# Patient Record
Sex: FEMALE | Race: WHITE | NOT HISPANIC OR LATINO | Employment: FULL TIME | URBAN - METROPOLITAN AREA
[De-identification: names, ages, dates, MRNs, and addresses within clinical notes are randomized per-mention and may not be internally consistent; named-entity substitution may affect disease eponyms.]

---

## 2023-05-23 ENCOUNTER — LAB (OUTPATIENT)
Dept: LAB | Facility: LAB | Age: 48
End: 2023-05-23
Payer: COMMERCIAL

## 2023-05-23 ENCOUNTER — OFFICE VISIT (OUTPATIENT)
Dept: PRIMARY CARE | Facility: CLINIC | Age: 48
End: 2023-05-23
Payer: COMMERCIAL

## 2023-05-23 VITALS
SYSTOLIC BLOOD PRESSURE: 110 MMHG | BODY MASS INDEX: 21.92 KG/M2 | WEIGHT: 131.6 LBS | DIASTOLIC BLOOD PRESSURE: 70 MMHG | HEIGHT: 65 IN | HEART RATE: 110 BPM

## 2023-05-23 DIAGNOSIS — R04.2 HEMOPTYSIS: Primary | ICD-10-CM

## 2023-05-23 DIAGNOSIS — I26.99 MULTIPLE PULMONARY EMBOLI (MULTI): ICD-10-CM

## 2023-05-23 DIAGNOSIS — D47.3 ESSENTIAL (HEMORRHAGIC) THROMBOCYTHEMIA (MULTI): ICD-10-CM

## 2023-05-23 DIAGNOSIS — R00.0 TACHYCARDIA: ICD-10-CM

## 2023-05-23 DIAGNOSIS — R04.2 HEMOPTYSIS: ICD-10-CM

## 2023-05-23 PROBLEM — E78.5 HYPERLIPIDEMIA: Status: ACTIVE | Noted: 2023-05-23

## 2023-05-23 PROBLEM — R79.89 ABNORMAL CBC: Status: ACTIVE | Noted: 2023-05-23

## 2023-05-23 PROBLEM — K51.90 ULCERATIVE COLITIS (MULTI): Status: ACTIVE | Noted: 2018-11-01

## 2023-05-23 LAB
BASOPHILS (10*3/UL) IN BLOOD BY AUTOMATED COUNT: 0.23 X10E9/L (ref 0–0.1)
BASOPHILS/100 LEUKOCYTES IN BLOOD BY AUTOMATED COUNT: 1.3 % (ref 0–2)
DACROCYTES PRESENCE IN BLOOD BY LIGHT MICROSCOPY: NORMAL
EOSINOPHILS (10*3/UL) IN BLOOD BY AUTOMATED COUNT: 1.56 X10E9/L (ref 0–0.7)
EOSINOPHILS/100 LEUKOCYTES IN BLOOD BY AUTOMATED COUNT: 8.5 % (ref 0–6)
ERYTHROCYTE DISTRIBUTION WIDTH (RATIO) BY AUTOMATED COUNT: 17.8 % (ref 11.5–14.5)
ERYTHROCYTE MEAN CORPUSCULAR HEMOGLOBIN CONCENTRATION (G/DL) BY AUTOMATED: 30 G/DL (ref 32–36)
ERYTHROCYTE MEAN CORPUSCULAR VOLUME (FL) BY AUTOMATED COUNT: 79 FL (ref 80–100)
ERYTHROCYTES (10*6/UL) IN BLOOD BY AUTOMATED COUNT: 5.4 X10E12/L (ref 4–5.2)
HEMATOCRIT (%) IN BLOOD BY AUTOMATED COUNT: 42.7 % (ref 36–46)
HEMOGLOBIN (G/DL) IN BLOOD: 12.8 G/DL (ref 12–16)
IMMATURE GRANULOCYTES/100 LEUKOCYTES IN BLOOD BY AUTOMATED COUNT: 0.3 % (ref 0–0.9)
LEUKOCYTES (10*3/UL) IN BLOOD BY AUTOMATED COUNT: 18.3 X10E9/L (ref 4.4–11.3)
LYMPHOCYTES (10*3/UL) IN BLOOD BY AUTOMATED COUNT: 4.74 X10E9/L (ref 1.2–4.8)
LYMPHOCYTES/100 LEUKOCYTES IN BLOOD BY AUTOMATED COUNT: 25.9 % (ref 13–44)
MONOCYTES (10*3/UL) IN BLOOD BY AUTOMATED COUNT: 1.17 X10E9/L (ref 0.1–1)
MONOCYTES/100 LEUKOCYTES IN BLOOD BY AUTOMATED COUNT: 6.4 % (ref 2–10)
NEUTROPHILS (10*3/UL) IN BLOOD BY AUTOMATED COUNT: 10.55 X10E9/L (ref 1.2–7.7)
NEUTROPHILS/100 LEUKOCYTES IN BLOOD BY AUTOMATED COUNT: 57.6 % (ref 40–80)
OVALOCYTES PRESENCE IN BLOOD BY LIGHT MICROSCOPY: NORMAL
PLATELETS (10*3/UL) IN BLOOD AUTOMATED COUNT: 1618 X10E9/L (ref 150–450)
RBC MORPHOLOGY IN BLOOD: NORMAL
SCHISTOCYTES (PRESENCE) IN BLOOD BY LIGHT MICROSCOPY: NORMAL

## 2023-05-23 PROCEDURE — 36415 COLL VENOUS BLD VENIPUNCTURE: CPT

## 2023-05-23 PROCEDURE — 99215 OFFICE O/P EST HI 40 MIN: CPT | Performed by: STUDENT IN AN ORGANIZED HEALTH CARE EDUCATION/TRAINING PROGRAM

## 2023-05-23 PROCEDURE — 1036F TOBACCO NON-USER: CPT | Performed by: STUDENT IN AN ORGANIZED HEALTH CARE EDUCATION/TRAINING PROGRAM

## 2023-05-23 PROCEDURE — 85025 COMPLETE CBC W/AUTO DIFF WBC: CPT

## 2023-05-23 RX ORDER — ONDANSETRON HYDROCHLORIDE 8 MG/1
8 TABLET, FILM COATED ORAL EVERY 8 HOURS PRN
COMMUNITY
Start: 2022-12-27

## 2023-05-23 RX ORDER — APIXABAN 5 MG/1
5 TABLET, FILM COATED ORAL EVERY 12 HOURS
COMMUNITY
End: 2023-09-14 | Stop reason: SDUPTHER

## 2023-05-23 RX ORDER — OMEPRAZOLE 20 MG/1
20 CAPSULE, DELAYED RELEASE ORAL DAILY
COMMUNITY
Start: 2022-12-27

## 2023-05-23 RX ORDER — CYCLOBENZAPRINE HCL 10 MG
10 TABLET ORAL NIGHTLY
COMMUNITY
End: 2023-09-14 | Stop reason: SDUPTHER

## 2023-05-23 NOTE — PROGRESS NOTES
Subjective   Patient ID: Elise Chávez is a 47 y.o. female who presents for Coughed up Blood.  Today she is accompanied by alone.     HPI  Elise is presenting into the office today because of coughing up blood  She has a longstanding past medical history of thrombocytosis, elevated platelet count, and multiple PEs that were found in her lungs  She continues to take Eliquis twice daily as indicated in the chart but unfortunate her hematologist/oncologist just left the system to [place]  Has not followed up within the past few months and now asking to have her lungs looked into again     Ultimately over the past weekend she started to notice that she had a coughing spell due to laughing a lot on Saturday    Unfortunately she continued to cough up some blood as well as even some minor clots within her sputum off-and-on on Saturday, Sunday, and even Monday until all of this resolved spontaneously    Stated that all of her signs/symptoms mainly occurred when she was laying flat but denies any shortness of breath or any other signs/symptoms at this time    Due to this, she is now asking if she can get another CT angiogram of her chest to make sure there are no underlying clots or anything else underlying  Also wondering if she can make sure that her blood count is normalizing and wishes to have her CBC repeated after today's visit     Lastly, mentioned that she is following up with a holistic provider and taking supplements and has an appointment in the beginning of June  Wishes to update myself at today's visit    Current Outpatient Medications on File Prior to Visit   Medication Sig Dispense Refill    omeprazole (PriLOSEC) 20 mg DR capsule Take 1 capsule (20 mg) by mouth once daily.      ondansetron (Zofran) 8 mg tablet Take 1 tablet (8 mg) by mouth every 8 hours if needed for nausea.      cyclobenzaprine (Flexeril) 10 mg tablet Take 1 tablet (10 mg) by mouth once daily at bedtime.      Eliquis 5 mg tablet Take 1 tablet (5  "mg) by mouth every 12 hours.       No current facility-administered medications on file prior to visit.        No Known Allergies      There is no immunization history on file for this patient.      Review of Systems  All pertinent positive symptoms are included in the history of present illness.  All other systems have been reviewed and are negative and noncontributory to this patient's current ailments.     Objective   /70 (BP Location: Left arm, Patient Position: Sitting, BP Cuff Size: Adult)   Pulse 110   Ht 1.651 m (5' 5\")   Wt 59.7 kg (131 lb 9.6 oz)   BMI 21.90 kg/m²   BSA: 1.65 meters squared    Physical Exam  CONSTITUTIONAL - well nourished, well developed, looks like stated age, in no acute distress, not ill-appearing, and not tired appearing  SKIN - normal skin color and pigmentation, normal skin turgor without rash, lesions, or nodules visualized  HEAD - no trauma, normocephalic  EYES - normal external exam  CHEST -no distressed breathing, good effort, tachycardic, lungs clear to auscultation bilaterally  EXTREMITIES - no edema, no deformities  NEUROLOGICAL - normal balance, normal motor, no ataxia  PSYCHIATRIC - alert, pleasant and cordial, age-appropriate    Assessment/Plan   1.  Hemoptysis/essential thrombocytopenia/multiple pulmonary emboli, history/tachycardia    Due to all of your signs/symptoms I am fearful that there may be another PE or other clots that are underlying or even developing/worsening    I did order a CT angio chest for PE that was actually done this past fall and was negative    I understand that you are not fully concerned as you are not labored in breathing but I did recommend you get this done at your earliest mutual convenience  I know you did this last at [facility] and I would recommend you call and make an appointment after today's visit    At any point if you have worsening chest pain, palpitations, shortness of breath, difficulty breathing, or any other " acute signs/symptoms, I would recommend that you go to the emergency room immediately to be acutely evaluated    We also had a long conversation about me being fearful of something else underlying and that the Eliquis may not be fully beneficial or even you may need to follow-up with another hematologist to establish    Please contact the office if you have any further questions/concerns

## 2023-05-24 ENCOUNTER — TELEPHONE (OUTPATIENT)
Dept: PRIMARY CARE | Facility: CLINIC | Age: 48
End: 2023-05-24
Payer: COMMERCIAL

## 2023-05-24 LAB — CBC DIFFERENTIAL PATH REVIEW: NORMAL

## 2023-05-24 NOTE — RESULT ENCOUNTER NOTE
Complete blood cell count shows an elevated white blood cell count 18.3 as well as an elevated red blood cell count of 5.4    Neutrophils also elevated at 10.55    Her platelet cell count is unfortunately the highest on file at 1618    I would recommend that she gets the CT scan done at her earliest mutual convenience and if she has at any point having any difficulty breathing or any acute signs/symptoms that she should go to the emergency room at [facility] as discussed at her appointment    It may be another option to also follow-up with a new hematologist such as Dr. Jhonny Rivera

## 2023-05-24 NOTE — TELEPHONE ENCOUNTER
----- Message from Cam Cazares DO sent at 5/24/2023  6:38 AM EDT -----  Complete blood cell count shows an elevated white blood cell count 18.3 as well as an elevated red blood cell count of 5.4    Neutrophils also elevated at 10.55    Her platelet cell count is unfortunately the highest on file at 1618    I would recommend that she gets the CT scan done at her earliest mutual convenience and if she has at any point having any difficulty breathing or any acute signs/symptoms that she should go to the emergency room at [facility] as discussed at her appointment    It may be another option to also follow-up with a new hematologist such as Dr. Jhonny Rivera

## 2023-06-13 ENCOUNTER — TELEPHONE (OUTPATIENT)
Dept: PRIMARY CARE | Facility: CLINIC | Age: 48
End: 2023-06-13
Payer: COMMERCIAL

## 2023-06-13 NOTE — TELEPHONE ENCOUNTER
CT chest showed some right lung scarring with some lesions assuming a nodular configuration which may be slightly more prominent compared to the previous study    It was recommended a short-term interval follow-up in 6 months is suggested    She continues to have some slight continuing pericardial effusion/fluid around her heart    This appears that this was only a CT chest that was done but I ordered a CT angio to make sure there were no pulmonary emboli    I feel that the CT chest was not fully accurate and I would almost recommend that we repeat another CT angio to make sure that there are no clots as the CT chest may not be fully accurate for finding anything

## 2023-06-14 ENCOUNTER — TELEPHONE (OUTPATIENT)
Dept: PRIMARY CARE | Facility: CLINIC | Age: 48
End: 2023-06-14
Payer: COMMERCIAL

## 2023-06-14 NOTE — TELEPHONE ENCOUNTER
Spoke with the pt about her CT Chest results, pt states her veins were not holding the day of the imaging and she made the decision to go ahead without, pt was dehydrated.     In regards to the lesions of the right lung, she wants to know what could cause that and if they are of concern?

## 2023-09-13 ENCOUNTER — OFFICE VISIT (OUTPATIENT)
Dept: PRIMARY CARE | Facility: CLINIC | Age: 48
End: 2023-09-13
Payer: COMMERCIAL

## 2023-09-13 ENCOUNTER — LAB (OUTPATIENT)
Dept: LAB | Facility: LAB | Age: 48
End: 2023-09-13
Payer: COMMERCIAL

## 2023-09-13 VITALS
BODY MASS INDEX: 21.69 KG/M2 | DIASTOLIC BLOOD PRESSURE: 78 MMHG | WEIGHT: 130.2 LBS | HEIGHT: 65 IN | HEART RATE: 140 BPM | SYSTOLIC BLOOD PRESSURE: 112 MMHG

## 2023-09-13 DIAGNOSIS — R04.2 HEMOPTYSIS: ICD-10-CM

## 2023-09-13 DIAGNOSIS — R00.0 TACHYCARDIA: ICD-10-CM

## 2023-09-13 DIAGNOSIS — R04.2 HEMOPTYSIS: Primary | ICD-10-CM

## 2023-09-13 DIAGNOSIS — D47.3 ESSENTIAL (HEMORRHAGIC) THROMBOCYTHEMIA (MULTI): ICD-10-CM

## 2023-09-13 DIAGNOSIS — I26.99 MULTIPLE PULMONARY EMBOLI (MULTI): ICD-10-CM

## 2023-09-13 LAB
ACANTHOCYTES PRESENCE IN BLOOD BY LIGHT MICROSCOPY: NORMAL
BASOPHILS (10*3/UL) IN BLOOD BY AUTOMATED COUNT: 0.2 X10E9/L (ref 0–0.1)
BASOPHILS/100 LEUKOCYTES IN BLOOD BY AUTOMATED COUNT: 1.3 % (ref 0–2)
DACROCYTES PRESENCE IN BLOOD BY LIGHT MICROSCOPY: NORMAL
EOSINOPHILS (10*3/UL) IN BLOOD BY AUTOMATED COUNT: 0.94 X10E9/L (ref 0–0.7)
EOSINOPHILS/100 LEUKOCYTES IN BLOOD BY AUTOMATED COUNT: 6.3 % (ref 0–6)
ERYTHROCYTE DISTRIBUTION WIDTH (RATIO) BY AUTOMATED COUNT: 22 % (ref 11.5–14.5)
ERYTHROCYTE MEAN CORPUSCULAR HEMOGLOBIN CONCENTRATION (G/DL) BY AUTOMATED: 30.2 G/DL (ref 32–36)
ERYTHROCYTE MEAN CORPUSCULAR VOLUME (FL) BY AUTOMATED COUNT: 75 FL (ref 80–100)
ERYTHROCYTES (10*6/UL) IN BLOOD BY AUTOMATED COUNT: 5.2 X10E12/L (ref 4–5.2)
HEMATOCRIT (%) IN BLOOD BY AUTOMATED COUNT: 39.1 % (ref 36–46)
HEMOGLOBIN (G/DL) IN BLOOD: 11.8 G/DL (ref 12–16)
HYPOCHROMIA (PRESENCE) IN BLOOD BY LIGHT MICROSCOPY: NORMAL
IMMATURE GRANULOCYTES/100 LEUKOCYTES IN BLOOD BY AUTOMATED COUNT: 0.4 % (ref 0–0.9)
LEUKOCYTES (10*3/UL) IN BLOOD BY AUTOMATED COUNT: 14.9 X10E9/L (ref 4.4–11.3)
LYMPHOCYTES (10*3/UL) IN BLOOD BY AUTOMATED COUNT: 3.7 X10E9/L (ref 1.2–4.8)
LYMPHOCYTES/100 LEUKOCYTES IN BLOOD BY AUTOMATED COUNT: 24.8 % (ref 13–44)
MONOCYTES (10*3/UL) IN BLOOD BY AUTOMATED COUNT: 0.88 X10E9/L (ref 0.1–1)
MONOCYTES/100 LEUKOCYTES IN BLOOD BY AUTOMATED COUNT: 5.9 % (ref 2–10)
NEUTROPHILS (10*3/UL) IN BLOOD BY AUTOMATED COUNT: 9.14 X10E9/L (ref 1.2–7.7)
NEUTROPHILS/100 LEUKOCYTES IN BLOOD BY AUTOMATED COUNT: 61.3 % (ref 40–80)
OVALOCYTES PRESENCE IN BLOOD BY LIGHT MICROSCOPY: NORMAL
PLATELETS (10*3/UL) IN BLOOD AUTOMATED COUNT: 1646 X10E9/L (ref 150–450)
RBC MORPHOLOGY IN BLOOD: NORMAL
SCHISTOCYTES (PRESENCE) IN BLOOD BY LIGHT MICROSCOPY: NORMAL
TARGET CELLS IN BLOOD BY LIGHT MICROSCOPY: NORMAL

## 2023-09-13 PROCEDURE — 85025 COMPLETE CBC W/AUTO DIFF WBC: CPT

## 2023-09-13 PROCEDURE — 99215 OFFICE O/P EST HI 40 MIN: CPT | Performed by: STUDENT IN AN ORGANIZED HEALTH CARE EDUCATION/TRAINING PROGRAM

## 2023-09-13 PROCEDURE — 36415 COLL VENOUS BLD VENIPUNCTURE: CPT

## 2023-09-13 PROCEDURE — 93000 ELECTROCARDIOGRAM COMPLETE: CPT | Performed by: STUDENT IN AN ORGANIZED HEALTH CARE EDUCATION/TRAINING PROGRAM

## 2023-09-13 PROCEDURE — 1036F TOBACCO NON-USER: CPT | Performed by: STUDENT IN AN ORGANIZED HEALTH CARE EDUCATION/TRAINING PROGRAM

## 2023-09-13 NOTE — PROGRESS NOTES
Subjective   Patient ID: Elise Chávez is a 47 y.o. female who presents for Coughing Up Blood.  Today she is accompanied by alone.     HPI  Elise is presenting into the office today because of coughing up blood once again      She has a longstanding past medical history of thrombocytosis, elevated platelet count, and multiple PEs that were found in her lungs    She continues to take Eliquis twice daily as indicated in the chart  In the past it was noted that her oncologist/hematologist left the system to go down to [place]    Has been noticing increased signs/symptoms of a cough when she is doing anything strenuous and even noticing that she has coughed up which she believes could be even parts of a blood clot or even just blood itself    In the past this is happened before including during her last appointment on 5/23/2023    Denies any excessive shortness of breath or any chest pain but stated in the past when she had blood clots her platelet cell count was below 600-900  Now wondering if a CBC is needed to be done    Also mentioned that she was not fully compliant with her Eliquis twice daily on occasion was just taking 2 at night    On further questioning she is now concerned on if this could be something underlying and now asking for further guidance/recommendations and even wondering if another CT angiogram needs to be done    It was recommended before this appointment that she should have went to the emergency room but she wished to discuss this further with myself today      Current Outpatient Medications on File Prior to Visit   Medication Sig Dispense Refill    cyclobenzaprine (Flexeril) 10 mg tablet Take 1 tablet (10 mg) by mouth once daily at bedtime.      Eliquis 5 mg tablet Take 1 tablet (5 mg) by mouth every 12 hours.      omeprazole (PriLOSEC) 20 mg DR capsule Take 1 capsule (20 mg) by mouth once daily.      ondansetron (Zofran) 8 mg tablet Take 1 tablet (8 mg) by mouth every 8 hours if needed for  "nausea.       No current facility-administered medications on file prior to visit.        No Known Allergies      There is no immunization history on file for this patient.      Review of Systems  All pertinent positive symptoms are included in the history of present illness.  All other systems have been reviewed and are negative and noncontributory to this patient's current ailments.     Objective   /78 (BP Location: Left arm, Patient Position: Sitting, BP Cuff Size: Adult)   Pulse (!) 140   Ht 1.651 m (5' 5\")   Wt 59.1 kg (130 lb 3.2 oz)   BMI 21.67 kg/m²   BSA: 1.65 meters squared    Physical Exam  CONSTITUTIONAL - well nourished, well developed, looks like stated age, in no acute distress, not ill-appearing, and not tired appearing  SKIN - normal skin color and pigmentation, normal skin turgor without rash, lesions, or nodules visualized  HEAD - no trauma, normocephalic  EYES - normal external exam  CHEST -no distressed breathing, good effort, tachycardic, lungs clear to auscultation bilaterally  EXTREMITIES - no edema, no deformities  NEUROLOGICAL - normal balance, normal motor, no ataxia  PSYCHIATRIC - alert, pleasant and cordial, age-appropriate    Assessment/Plan   1.  Hemoptysis/essential thrombocytopenia/multiple pulmonary emboli, history/tachycardia    Due to all of your signs/symptoms I am fearful that there may be another PE or other clots that are underlying or even developing/worsening once again    I recommended to go the emergency room today but you declined but we did agree that I will order a stat CT angio chest for PE to be fully thorough    Please try to get this at [facility] as you have had success within their facilities in the past    At any point if you have worsening chest pain, palpitations, shortness of breath, difficulty breathing, or any other acute signs/symptoms, I would recommend that you go to the emergency room immediately to be acutely evaluated    I know we " did order a CBC to see where your platelet count is and I understand that if you are platelet count is slightly lower that has correlated with you having a blood clot    We will notify of the results and make recommendations accordingly in the morning    Lastly, to be fully thorough, I did do an EKG which showed sinus tachycardia with a heart rate of 117 bpm  I was looking for the classic [id number] with a right bundle branch block but your EKG did not show this today per my read and the computer    We even discussed this at length and compared this to your previous EKG that was done in 2021 within the emergency room    We also had a long conversation about me being fearful of something else underlying and that the Eliquis may not be fully beneficial or even you may need to follow-up with another hematologist to establish once again    Please contact the office if you have any further questions/concerns

## 2023-09-14 ENCOUNTER — TELEPHONE (OUTPATIENT)
Dept: PRIMARY CARE | Facility: CLINIC | Age: 48
End: 2023-09-14
Payer: COMMERCIAL

## 2023-09-14 DIAGNOSIS — M62.830 SPASM OF MUSCLE OF LOWER BACK: ICD-10-CM

## 2023-09-14 DIAGNOSIS — I26.99 MULTIPLE PULMONARY EMBOLI (MULTI): ICD-10-CM

## 2023-09-14 DIAGNOSIS — R06.02 SHORTNESS OF BREATH: ICD-10-CM

## 2023-09-14 LAB — CBC DIFFERENTIAL PATH REVIEW: NORMAL

## 2023-09-14 RX ORDER — CYCLOBENZAPRINE HCL 10 MG
10 TABLET ORAL NIGHTLY
Qty: 30 TABLET | Refills: 0 | Status: SHIPPED | OUTPATIENT
Start: 2023-09-14 | End: 2023-12-05 | Stop reason: SDUPTHER

## 2023-09-14 NOTE — TELEPHONE ENCOUNTER
Pt asking if she is able to get a refill on her muscle relaxer and if you are able to take over her Rx for Eliquis. Pt does need a refill on the Eliquis.

## 2023-09-14 NOTE — RESULT ENCOUNTER NOTE
Complete blood cell count shows an elevated white blood cell count at 14.9 and a slight anemia with hemoglobin 11.8    Platelet count continues to be elevated at 1646    Neutrophils also elevated at 914    I would truly recommend that she gets a CT scan of her chest with IV contrast at her earliest mutual convenience    At any time if she has worsening or acute signs/symptoms she needs to go to the emergency room immediately

## 2023-09-14 NOTE — TELEPHONE ENCOUNTER
Spoke w/pt, pt aware of results      Complete blood cell count shows an elevated white blood cell count at 14.9 and a slight anemia with hemoglobin 11.8     Platelet count continues to be elevated at 1646     Neutrophils also elevated at 914     I would truly recommend that she gets a CT scan of her chest with IV contrast at her earliest mutual convenience     At any time if she has worsening or acute signs/symptoms she needs to go to the emergency room immediately

## 2023-09-20 ENCOUNTER — TELEPHONE (OUTPATIENT)
Dept: PRIMARY CARE | Facility: CLINIC | Age: 48
End: 2023-09-20
Payer: COMMERCIAL

## 2023-09-20 NOTE — TELEPHONE ENCOUNTER
"Please call the patient as we just received her CT scan that was done on 9/19/2023    Unfortunately there is an acute pulmonary thromboembolism with extensive thrombus in the right pulmonary arterial circulation    It was also mentioned that a \"large pulmonary embolism in the right main pulmonary artery \"    There is also a small pericardial effusion which means fluid around the heart that has increased since the previous CT scan as well as some scarring in the right lung that is stable in appearance    Due to this I need her to go to the hospital immediately to be acutely evaluated as upon further evaluation it was noted to be 3 x 2.5 cm within this pulmonary artery and this could be life-threatening  "

## 2023-12-05 ENCOUNTER — OFFICE VISIT (OUTPATIENT)
Dept: PRIMARY CARE | Facility: CLINIC | Age: 48
End: 2023-12-05
Payer: COMMERCIAL

## 2023-12-05 VITALS
HEART RATE: 131 BPM | DIASTOLIC BLOOD PRESSURE: 74 MMHG | SYSTOLIC BLOOD PRESSURE: 112 MMHG | WEIGHT: 131.8 LBS | BODY MASS INDEX: 21.93 KG/M2

## 2023-12-05 DIAGNOSIS — R00.0 TACHYCARDIA: ICD-10-CM

## 2023-12-05 DIAGNOSIS — D47.3 ESSENTIAL (HEMORRHAGIC) THROMBOCYTHEMIA (MULTI): Primary | ICD-10-CM

## 2023-12-05 DIAGNOSIS — K51.80 OTHER ULCERATIVE COLITIS WITHOUT COMPLICATION (MULTI): ICD-10-CM

## 2023-12-05 DIAGNOSIS — M62.830 SPASM OF MUSCLE OF LOWER BACK: ICD-10-CM

## 2023-12-05 DIAGNOSIS — R04.2 HEMOPTYSIS: ICD-10-CM

## 2023-12-05 DIAGNOSIS — E78.2 MIXED HYPERLIPIDEMIA: ICD-10-CM

## 2023-12-05 PROCEDURE — 1036F TOBACCO NON-USER: CPT | Performed by: STUDENT IN AN ORGANIZED HEALTH CARE EDUCATION/TRAINING PROGRAM

## 2023-12-05 PROCEDURE — 99215 OFFICE O/P EST HI 40 MIN: CPT | Performed by: STUDENT IN AN ORGANIZED HEALTH CARE EDUCATION/TRAINING PROGRAM

## 2023-12-05 RX ORDER — CYCLOBENZAPRINE HCL 10 MG
10 TABLET ORAL NIGHTLY
Qty: 30 TABLET | Refills: 0 | Status: SHIPPED | OUTPATIENT
Start: 2023-12-05

## 2023-12-05 RX ORDER — CYCLOBENZAPRINE HCL 10 MG
10 TABLET ORAL NIGHTLY
Qty: 90 TABLET | Refills: 1 | Status: CANCELLED | OUTPATIENT
Start: 2023-12-05

## 2023-12-05 NOTE — PROGRESS NOTES
Subjective   Patient ID: Elise Chávez is a 48 y.o. female who presents for Follow-up.  Today she is accompanied by alone.     HPI  1. Elevated platelet count/thrombocytosis/cough/shortness of breath/tachycardia   Patient has been following a naturopath/chiropractor/acupuncture, Dr. Nhan Rabago.  States she is starting to feel better and currently taking natural/holistic medicine prescribed by him.  She is no longer taking her Eliquis as it messed with her strength and is taking a natural blood thinner.  Patient will be going to [place] on Tuesday would like to repeat her blood work as well as check her platelets.  Patient admits that on last Thursday she did cough up/vomit blood.  But has not noticed anything since.      Current Outpatient Medications on File Prior to Visit   Medication Sig Dispense Refill    apixaban (Eliquis) 5 mg tablet Take 1 tablet (5 mg) by mouth every 12 hours. 180 tablet 0    cyclobenzaprine (Flexeril) 10 mg tablet Take 1 tablet (10 mg) by mouth once daily at bedtime. 30 tablet 0    omeprazole (PriLOSEC) 20 mg DR capsule Take 1 capsule (20 mg) by mouth once daily.      ondansetron (Zofran) 8 mg tablet Take 1 tablet (8 mg) by mouth every 8 hours if needed for nausea.       No current facility-administered medications on file prior to visit.        No Known Allergies      There is no immunization history on file for this patient.      Review of Systems  All pertinent positive symptoms are included in the history of present illness.  All other systems have been reviewed and are negative and noncontributory to this patient's current ailments.     Objective   /74 (BP Location: Left arm, Patient Position: Sitting, BP Cuff Size: Adult)   Pulse (!) 131   Wt 59.8 kg (131 lb 12.8 oz)   BMI 21.93 kg/m²   BSA: 1.66 meters squared  No visits with results within 1 Month(s) from this visit.   Latest known visit with results is:   Lab on 09/13/2023   Component Date Value Ref Range Status    WBC  09/13/2023 14.9 (H)  4.4 - 11.3 x10E9/L Final    RBC 09/13/2023 5.20  4.00 - 5.20 x10E12/L Final    Hemoglobin 09/13/2023 11.8 (L)  12.0 - 16.0 g/dL Final    Hematocrit 09/13/2023 39.1  36.0 - 46.0 % Final    MCV 09/13/2023 75 (L)  80 - 100 fL Final    MCHC 09/13/2023 30.2 (L)  32.0 - 36.0 g/dL Final    Platelets 09/13/2023 1646 (H)  150 - 450 x10E9/L Final    RDW 09/13/2023 22.0 (H)  11.5 - 14.5 % Final    Neutrophils % 09/13/2023 61.3  40.0 - 80.0 % Final    Immature Granulocytes %, Automated 09/13/2023 0.4  0.0 - 0.9 % Final     Immature Granulocyte Count (IG) includes promyelocytes,    myelocytes and metamyelocytes but does not include bands.   Percent differential counts (%) should be interpreted in the   context of the absolute cell counts (cells/L).    Lymphocytes % 09/13/2023 24.8  13.0 - 44.0 % Final    Monocytes % 09/13/2023 5.9  2.0 - 10.0 % Final    Eosinophils % 09/13/2023 6.3  0.0 - 6.0 % Final    Basophils % 09/13/2023 1.3  0.0 - 2.0 % Final    Neutrophils Absolute 09/13/2023 9.14 (H)  1.20 - 7.70 x10E9/L Final    Lymphocytes Absolute 09/13/2023 3.70  1.20 - 4.80 x10E9/L Final    Monocytes Absolute 09/13/2023 0.88  0.10 - 1.00 x10E9/L Final    Eosinophils Absolute 09/13/2023 0.94 (H)  0.00 - 0.70 x10E9/L Final    Basophils Absolute 09/13/2023 0.20 (H)  0.00 - 0.10 x10E9/L Final    RBC Morphology 09/13/2023 See Below   Final    Hypochromia 09/13/2023 Mild   Final    RBC Fragments 09/13/2023 Few   Final    Target Cells 09/13/2023 Few   Final    Ovalocytes 09/13/2023 Many   Final    Teardrop Cells 09/13/2023 Few   Final    Acanthocytes 09/13/2023 Few   Final    CBC Differential Path Review 09/13/2023 CANCELED   Final-Edited    Comment:  By her/his signature above, the Pathologist   listed as making the final interpretation   certifies that she/he has personally reviewed    this case.  ----------------------------------------------     Result canceled by the ancillary.         Physical  Exam  CONSTITUTIONAL - well nourished, well developed, looks like stated age, in no acute distress, not ill-appearing, and not tired appearing  SKIN - normal skin color and pigmentation, normal skin turgor without rash, lesions, or nodules visualized  HEAD - no trauma, normocephalic  EYES - pupils are equal and reactive to light, extraocular muscles are intact, and normal external exam  ENT - uvula midline, normal tongue movement and throat normal, no exudate, nasal passage without discharge and patent  NECK - supple without rigidity, no neck mass was observed,   LUNG - clear to auscultation, no wheezing, no crackles and no rales, good effort  CARDIAC -tachycardic, no skipped beats no murmur  ABDOMEN - no organomegaly, soft, nontender, nondistended, normal bowel sounds  EXTREMITIES - no edema, no deformities  NEUROLOGICAL - normal gait, normal balance, normal motor, alert, oriented and no focal signs  PSYCHIATRIC - alert, pleasant and cordial, age-appropriate      Assessment/Plan     1. Elevated platelet count/thrombocytosis/cough/shortness of breath/tachycardia   Fasting blood work has been ordered for you to get done at your earliest convenience.  We will notify you of results and treatment recommendations.  Discussed with patient at length the importance of Eliquis as a blood thinner.  Recommend that you get a stat chest CT before you leave for [place] as you did have an episode of hemoptysis.

## 2023-12-11 ENCOUNTER — LAB (OUTPATIENT)
Dept: LAB | Facility: LAB | Age: 48
End: 2023-12-11
Payer: COMMERCIAL

## 2023-12-11 DIAGNOSIS — R00.0 TACHYCARDIA: ICD-10-CM

## 2023-12-11 DIAGNOSIS — D47.3 ESSENTIAL (HEMORRHAGIC) THROMBOCYTHEMIA (MULTI): ICD-10-CM

## 2023-12-11 DIAGNOSIS — E78.2 MIXED HYPERLIPIDEMIA: ICD-10-CM

## 2023-12-11 DIAGNOSIS — R04.2 HEMOPTYSIS: ICD-10-CM

## 2023-12-11 LAB
ACANTHOCYTES BLD QL SMEAR: NORMAL
BASOPHILS # BLD AUTO: 0.2 X10*3/UL (ref 0–0.1)
BASOPHILS NFR BLD AUTO: 1 %
DACRYOCYTES BLD QL SMEAR: NORMAL
EOSINOPHIL # BLD AUTO: 1.04 X10*3/UL (ref 0–0.7)
EOSINOPHIL NFR BLD AUTO: 5 %
ERYTHROCYTE [DISTWIDTH] IN BLOOD BY AUTOMATED COUNT: 24.4 % (ref 11.5–14.5)
HCT VFR BLD AUTO: 44.5 % (ref 36–46)
HGB BLD-MCNC: 13.2 G/DL (ref 12–16)
IMM GRANULOCYTES # BLD AUTO: 0.1 X10*3/UL (ref 0–0.7)
IMM GRANULOCYTES NFR BLD AUTO: 0.5 % (ref 0–0.9)
LYMPHOCYTES # BLD AUTO: 4.41 X10*3/UL (ref 1.2–4.8)
LYMPHOCYTES NFR BLD AUTO: 21.3 %
MCH RBC QN AUTO: 23.4 PG (ref 26–34)
MCHC RBC AUTO-ENTMCNC: 29.7 G/DL (ref 32–36)
MCV RBC AUTO: 79 FL (ref 80–100)
MONOCYTES # BLD AUTO: 1.36 X10*3/UL (ref 0.1–1)
MONOCYTES NFR BLD AUTO: 6.6 %
NEUTROPHILS # BLD AUTO: 13.56 X10*3/UL (ref 1.2–7.7)
NEUTROPHILS NFR BLD AUTO: 65.6 %
NRBC BLD-RTO: 0 /100 WBCS (ref 0–0)
OVALOCYTES BLD QL SMEAR: NORMAL
PLATELET # BLD AUTO: 1328 X10*3/UL (ref 150–450)
RBC # BLD AUTO: 5.65 X10*6/UL (ref 4–5.2)
RBC MORPH BLD: NORMAL
SCHISTOCYTES BLD QL SMEAR: NORMAL
TARGETS BLD QL SMEAR: NORMAL
WBC # BLD AUTO: 20.7 X10*3/UL (ref 4.4–11.3)

## 2023-12-11 PROCEDURE — 36415 COLL VENOUS BLD VENIPUNCTURE: CPT

## 2023-12-11 PROCEDURE — 80061 LIPID PANEL: CPT

## 2023-12-11 PROCEDURE — 85025 COMPLETE CBC W/AUTO DIFF WBC: CPT

## 2023-12-11 PROCEDURE — 80053 COMPREHEN METABOLIC PANEL: CPT

## 2023-12-12 LAB
ALBUMIN SERPL BCP-MCNC: 4.2 G/DL (ref 3.4–5)
ALP SERPL-CCNC: 99 U/L (ref 33–110)
ALT SERPL W P-5'-P-CCNC: 11 U/L (ref 7–45)
ANION GAP SERPL CALC-SCNC: 18 MMOL/L
AST SERPL W P-5'-P-CCNC: 18 U/L (ref 9–39)
BILIRUB SERPL-MCNC: 0.5 MG/DL (ref 0–1.2)
BUN SERPL-MCNC: 11 MG/DL (ref 6–23)
CALCIUM SERPL-MCNC: 9.7 MG/DL (ref 8.6–10.3)
CHLORIDE SERPL-SCNC: 97 MMOL/L (ref 98–107)
CHOLEST SERPL-MCNC: 242 MG/DL (ref 0–199)
CHOLESTEROL/HDL RATIO: 4.5
CO2 SERPL-SCNC: 23 MMOL/L (ref 21–32)
CREAT SERPL-MCNC: 0.76 MG/DL (ref 0.5–1.05)
GFR SERPL CREATININE-BSD FRML MDRD: >90 ML/MIN/1.73M*2
GLUCOSE SERPL-MCNC: 98 MG/DL (ref 74–99)
HDLC SERPL-MCNC: 53.4 MG/DL
LDLC SERPL CALC-MCNC: 165 MG/DL
NON HDL CHOLESTEROL: 189 MG/DL (ref 0–149)
POTASSIUM SERPL-SCNC: 4 MMOL/L (ref 3.5–5.3)
PROT SERPL-MCNC: 8.5 G/DL (ref 6.4–8.2)
SODIUM SERPL-SCNC: 134 MMOL/L (ref 136–145)
TRIGL SERPL-MCNC: 119 MG/DL (ref 0–149)
VLDL: 24 MG/DL (ref 0–40)

## 2023-12-12 NOTE — RESULT ENCOUNTER NOTE
Cholesterol did decrease slightly down to 242, HDL 53, , triglycerides 119    Sugar, kidneys, liver, electrolytes are all within normal limits other than sodium being a few points below normal which I am not concerned about M protein being 0.3 above normal    White blood cell count one of the highest on file at 20.7 as well as platelet cell count being elevated at 1328  This is slightly decreased compared to the previous values but again this is concerning and I would want her to follow-up with a blood specialist or even her vascular specialty provider at [facility]    Neutrophils are also elevated at 13.56

## 2023-12-13 ENCOUNTER — APPOINTMENT (OUTPATIENT)
Dept: PRIMARY CARE | Facility: CLINIC | Age: 48
End: 2023-12-13
Payer: COMMERCIAL

## 2024-05-17 ENCOUNTER — APPOINTMENT (OUTPATIENT)
Dept: PRIMARY CARE | Facility: CLINIC | Age: 49
End: 2024-05-17
Payer: COMMERCIAL

## 2024-06-04 ENCOUNTER — APPOINTMENT (OUTPATIENT)
Dept: PRIMARY CARE | Facility: CLINIC | Age: 49
End: 2024-06-04
Payer: COMMERCIAL

## 2024-06-14 ENCOUNTER — APPOINTMENT (OUTPATIENT)
Dept: PRIMARY CARE | Facility: CLINIC | Age: 49
End: 2024-06-14
Payer: COMMERCIAL

## 2024-06-14 DIAGNOSIS — K51.80 OTHER ULCERATIVE COLITIS WITHOUT COMPLICATION (MULTI): ICD-10-CM

## 2024-06-14 DIAGNOSIS — R79.89 ABNORMAL CBC: ICD-10-CM

## 2024-06-14 DIAGNOSIS — R00.0 TACHYCARDIA: ICD-10-CM

## 2024-06-14 DIAGNOSIS — R04.2 HEMOPTYSIS: ICD-10-CM

## 2024-06-14 DIAGNOSIS — D47.3 ESSENTIAL (HEMORRHAGIC) THROMBOCYTHEMIA (MULTI): Primary | ICD-10-CM

## 2024-06-14 DIAGNOSIS — E78.2 MIXED HYPERLIPIDEMIA: ICD-10-CM

## 2024-06-14 DIAGNOSIS — I26.99 MULTIPLE PULMONARY EMBOLI (MULTI): ICD-10-CM

## 2024-06-14 PROCEDURE — 99215 OFFICE O/P EST HI 40 MIN: CPT | Performed by: STUDENT IN AN ORGANIZED HEALTH CARE EDUCATION/TRAINING PROGRAM

## 2024-06-14 RX ORDER — LORAZEPAM 1 MG/1
1 TABLET ORAL
COMMUNITY
Start: 2024-05-13

## 2024-06-14 RX ORDER — PYRIDOSTIGMINE BROMIDE 60 MG/1
TABLET ORAL
COMMUNITY
Start: 2024-06-12

## 2024-06-14 NOTE — PROGRESS NOTES
Subjective   Patient ID: Elise Chávez is a 48 y.o. female who presents for a hospital follow-up    HPI   Patient is calling into the office today via a telemedicine virtual visit to discuss signs/symptoms and continued care of her chronic history of pulmonary emboli    Unfortunately she was down in [place] and noted some shortness of breath and even some hemoptysis  This letter to the emergency room on 5/15/2024 in [place] and ultimately she had a a thrombectomy to have a large clot removed as well as older clots    At that time they released her and recommended to follow-up closely with hematology and other specialty providers  They ultimately placed her on Pradaxa which unfortunately caused signs/symptoms of acid reflux    Due to this she discontinue this medication    Ultimately and unfortunately, she ended back up into the emergency room on 5/28/2024 and was admitted once again within the ICU due to her condition    Ultimately upon discharge, she restarted her Eliquis and even followed up with a few other providers as indicated in the chart    Previous to all of this, she did follow-up with a rheumatology specialty provider who specializes in long-term COVID    She then had her sympathetic/parasympathetic system tested and was told that she is not fully controlled  This was done previous to her hospitalization in the beginning of May    She was then prescribed lorazepam for the parasympathetic system as well as another medication for her sympathetic system as noted in the chart  It was then noted that this should help her overall clotting factor as well as platelets with time but before she started doing this she went to the hospital as noted above    Lastly, she did reestablish with her old hematologist Dr. Franklin who has been following her closely ever since  She saw him on June 11 who told her that this is a conundrum since all of her clots are forming in the same location  They now have a plan in place on  medication that she should be using  Currently taking Eliquis as well as the natural supplements from her integrative provider here in [facility]    She will have a follow-up appointment with him in July as well as another appointment in July with the rheumatologist    Wishes to discuss this all with myself and keep me in the loop today    Review of Systems  All pertinent positive symptoms are included in the history of present illness.    All other systems have been reviewed and are negative and noncontributory to this patient's current ailments.    Objective   There were no vitals taken for this visit.    Physical Exam  CONSTITUTIONAL - well nourished, well developed, looks like stated age, in no acute distress, not ill-appearing, and not tired appearing  SKIN - normal skin color and pigmentation, normal skin turgor without rash, lesions, or nodules visualized  HEAD - no trauma, normocephalic  EYES - normal external exam  CHEST -no distressed breathing, good effort, coughing during examination  EXTREMITIES - no edema, no deformities  NEUROLOGICAL - normal balance, normal motor, no ataxia  PSYCHIATRIC - alert, pleasant and cordial, age-appropriate    Assessment/Plan   We had another long conversation about your history of elevated platelets as well as your multiple PEs over the past 4 years    I understand that this has been chronic in etiology that could even be due to COVID-19 or other causes    I truly recommend that you reconsider some of these treatment options so we can continue monitoring this closely  I would recommend you continue following up with your hematologist in [place] as well as the rheumatologist to see if this would help your overall state and prevent further clots from occurring    At any point if you notice worsening signs/symptoms or even new hemoptysis you need to notify me immediately and/or go to the nearest emergency room to be acutely evaluated    I also recommend that you should  reestablish with a cardiovascular provider at the [facility] to have a another set of eyes on your health    Otherwise, please do not discontinue any of your anticoagulants    I am happy that you are doing better but continue following up closely

## 2024-07-29 ENCOUNTER — LAB (OUTPATIENT)
Dept: LAB | Facility: LAB | Age: 49
End: 2024-07-29
Payer: COMMERCIAL

## 2024-07-29 ENCOUNTER — APPOINTMENT (OUTPATIENT)
Dept: PRIMARY CARE | Facility: CLINIC | Age: 49
End: 2024-07-29
Payer: COMMERCIAL

## 2024-07-29 VITALS
HEART RATE: 84 BPM | SYSTOLIC BLOOD PRESSURE: 118 MMHG | DIASTOLIC BLOOD PRESSURE: 70 MMHG | WEIGHT: 124 LBS | BODY MASS INDEX: 20.63 KG/M2 | OXYGEN SATURATION: 95 %

## 2024-07-29 DIAGNOSIS — R04.2 HEMOPTYSIS: ICD-10-CM

## 2024-07-29 DIAGNOSIS — K51.80 OTHER ULCERATIVE COLITIS WITHOUT COMPLICATION (MULTI): ICD-10-CM

## 2024-07-29 DIAGNOSIS — R79.89 ABNORMAL CBC: ICD-10-CM

## 2024-07-29 DIAGNOSIS — I26.99 MULTIPLE PULMONARY EMBOLI (MULTI): ICD-10-CM

## 2024-07-29 DIAGNOSIS — D47.3 ESSENTIAL (HEMORRHAGIC) THROMBOCYTHEMIA (MULTI): ICD-10-CM

## 2024-07-29 DIAGNOSIS — E78.2 MIXED HYPERLIPIDEMIA: ICD-10-CM

## 2024-07-29 DIAGNOSIS — D47.3 ESSENTIAL (HEMORRHAGIC) THROMBOCYTHEMIA (MULTI): Primary | ICD-10-CM

## 2024-07-29 DIAGNOSIS — R00.0 TACHYCARDIA: ICD-10-CM

## 2024-07-29 LAB
ACANTHOCYTES BLD QL SMEAR: NORMAL
ALBUMIN SERPL BCP-MCNC: 4.4 G/DL (ref 3.4–5)
ALP SERPL-CCNC: 90 U/L (ref 33–110)
ALT SERPL W P-5'-P-CCNC: 12 U/L (ref 7–45)
ANION GAP SERPL CALC-SCNC: 13 MMOL/L (ref 10–20)
AST SERPL W P-5'-P-CCNC: 20 U/L (ref 9–39)
BASOPHILS # BLD AUTO: 0.3 X10*3/UL (ref 0–0.1)
BASOPHILS NFR BLD AUTO: 1.3 %
BILIRUB SERPL-MCNC: 0.6 MG/DL (ref 0–1.2)
BUN SERPL-MCNC: 18 MG/DL (ref 6–23)
CALCIUM SERPL-MCNC: 10.1 MG/DL (ref 8.6–10.3)
CHLORIDE SERPL-SCNC: 100 MMOL/L (ref 98–107)
CO2 SERPL-SCNC: 28 MMOL/L (ref 21–32)
CREAT SERPL-MCNC: 0.83 MG/DL (ref 0.5–1.05)
EGFRCR SERPLBLD CKD-EPI 2021: 88 ML/MIN/1.73M*2
EOSINOPHIL # BLD AUTO: 1.13 X10*3/UL (ref 0–0.7)
EOSINOPHIL NFR BLD AUTO: 5 %
ERYTHROCYTE [DISTWIDTH] IN BLOOD BY AUTOMATED COUNT: 17.9 % (ref 11.5–14.5)
GLUCOSE SERPL-MCNC: 87 MG/DL (ref 74–99)
HCT VFR BLD AUTO: 45.7 % (ref 36–46)
HGB BLD-MCNC: 14.2 G/DL (ref 12–16)
IMM GRANULOCYTES # BLD AUTO: 0.13 X10*3/UL (ref 0–0.7)
IMM GRANULOCYTES NFR BLD AUTO: 0.6 % (ref 0–0.9)
LYMPHOCYTES # BLD AUTO: 3.98 X10*3/UL (ref 1.2–4.8)
LYMPHOCYTES NFR BLD AUTO: 17.5 %
MCH RBC QN AUTO: 24.9 PG (ref 26–34)
MCHC RBC AUTO-ENTMCNC: 31.1 G/DL (ref 32–36)
MCV RBC AUTO: 80 FL (ref 80–100)
MONOCYTES # BLD AUTO: 1.44 X10*3/UL (ref 0.1–1)
MONOCYTES NFR BLD AUTO: 6.3 %
NEUTROPHILS # BLD AUTO: 15.71 X10*3/UL (ref 1.2–7.7)
NEUTROPHILS NFR BLD AUTO: 69.3 %
NRBC BLD-RTO: 0 /100 WBCS (ref 0–0)
OVALOCYTES BLD QL SMEAR: NORMAL
PLATELET # BLD AUTO: 1880 X10*3/UL (ref 150–450)
POTASSIUM SERPL-SCNC: 4.1 MMOL/L (ref 3.5–5.3)
PROT SERPL-MCNC: 8.4 G/DL (ref 6.4–8.2)
RBC # BLD AUTO: 5.71 X10*6/UL (ref 4–5.2)
RBC MORPH BLD: NORMAL
SODIUM SERPL-SCNC: 137 MMOL/L (ref 136–145)
WBC # BLD AUTO: 22.7 X10*3/UL (ref 4.4–11.3)

## 2024-07-29 PROCEDURE — 99214 OFFICE O/P EST MOD 30 MIN: CPT | Performed by: STUDENT IN AN ORGANIZED HEALTH CARE EDUCATION/TRAINING PROGRAM

## 2024-07-29 PROCEDURE — 36415 COLL VENOUS BLD VENIPUNCTURE: CPT

## 2024-07-29 PROCEDURE — 80053 COMPREHEN METABOLIC PANEL: CPT

## 2024-07-29 PROCEDURE — 1036F TOBACCO NON-USER: CPT | Performed by: STUDENT IN AN ORGANIZED HEALTH CARE EDUCATION/TRAINING PROGRAM

## 2024-07-29 PROCEDURE — 85060 BLOOD SMEAR INTERPRETATION: CPT | Performed by: STUDENT IN AN ORGANIZED HEALTH CARE EDUCATION/TRAINING PROGRAM

## 2024-07-29 PROCEDURE — 85025 COMPLETE CBC W/AUTO DIFF WBC: CPT

## 2024-07-29 RX ORDER — HYDROXYCHLOROQUINE SULFATE 200 MG/1
1 TABLET, FILM COATED ORAL
COMMUNITY
Start: 2024-07-02

## 2024-07-29 ASSESSMENT — PATIENT HEALTH QUESTIONNAIRE - PHQ9
2. FEELING DOWN, DEPRESSED OR HOPELESS: NOT AT ALL
1. LITTLE INTEREST OR PLEASURE IN DOING THINGS: NOT AT ALL
SUM OF ALL RESPONSES TO PHQ9 QUESTIONS 1 AND 2: 0

## 2024-07-29 NOTE — PROGRESS NOTES
Subjective   Patient ID: Elise Chávez is a 48 y.o. female who presents for a follow-up visit    HPI   Patient is coming into the office today to discuss signs/symptoms and continued care of her chronic history of pulmonary emboli    Based on her last note on 6/14/2024  Unfortunately she was down in [place] and noted some shortness of breath and even some hemoptysis  This letter to the emergency room on 5/15/2024 in [place] and ultimately she had a a thrombectomy to have a large clot removed as well as older clots    At that time they released her and recommended to follow-up closely with hematology and other specialty providers  They ultimately placed her on Pradaxa which unfortunately caused signs/symptoms of acid reflux    Due to this she discontinue this medication    Ultimately and unfortunately, she ended back up into the emergency room on 5/28/2024 and was admitted once again within the ICU due to her condition    Ultimately upon discharge, she restarted her Eliquis and even followed up with a few other providers as indicated in the chart    Previous to all of this, she did follow-up with a rheumatology specialty provider who specializes in long-term COVID    She then had her sympathetic/parasympathetic system tested and was told that she is not fully controlled  This was done previous to her hospitalization in the beginning of May    She was then prescribed lorazepam for the parasympathetic system as well as another medication for her sympathetic system as noted in the chart  It was then noted that this should help her overall clotting factor as well as platelets with time but before she started doing this she went to the hospital as noted above    Lastly, she did reestablish with her old hematologist Dr. Franklin who has been following her closely ever since  She saw him on June 11 who told her that this is a conundrum since all of her clots are forming in the same location  They now have a plan in place on  "medication that she should be using  Currently taking Eliquis as well as the natural supplements from her integrative provider here in [facility]    She will have a follow-up appointment with him in July as well as another appointment in July with the rheumatologist    Wishes to discuss this all with myself and keep me in the loop today    Today she is coming into the office to discuss this further  Ultimately did follow-up with the provider who is giving her benzodiazepines for her sympathetic and parasympathetic system but stated that her platelet count went even higher and the highest on file at 1.9    She ultimately discontinued all medications that were recommended by that provider and stated that she was not going to follow-up further    On further questioning, continues to follow-up with her hematologist and even started hydroxychloroquine earlier this month at 200 as indicated in the chart  Stated that she started on July 2    She then was recommended to start a new medication called \"BESREMi\" to be taken every 2 weeks at 50 mcg with the hope of getting her off of the hydroxychloroquine    It was recommended to get baseline lab work today including a CMP and a CBC but I will be checking every 2 weeks while she is getting these injections    Patient provided the injection today as she is fearful to provide this to herself at home  Wishes to discuss this at today's appointment and asking what the neck steps are    Review of Systems  All pertinent positive symptoms are included in the history of present illness.    All other systems have been reviewed and are negative and noncontributory to this patient's current ailments.    Objective   /70 (BP Location: Left arm, Patient Position: Sitting, BP Cuff Size: Adult)   Pulse 84   Wt 56.2 kg (124 lb)   SpO2 95%   BMI 20.63 kg/m²     Physical Exam  CONSTITUTIONAL - well nourished, well developed, looks like stated age, in no acute distress, not " ill-appearing, and not tired appearing  SKIN - normal skin color and pigmentation, normal skin turgor without rash, lesions, or nodules visualized  HEAD - no trauma, normocephalic  EYES - normal external exam  CHEST -no distressed breathing, good effort, heart regular rate and rhythm, no murmurs, rubs, or gallops, lungs clear to auscultation bilaterally  EXTREMITIES - no edema, no deformities  NEUROLOGICAL - normal balance, normal motor, no ataxia  PSYCHIATRIC - alert, pleasant and cordial, age-appropriate    Assessment/Plan   I am happy to hear that you are feeling overall better and your heart rate is the best on file  At this point I would recommend you continue following with the hematology/oncology provider is recommending, Dr. Franklin    We did provide the injection today at 50 mcg and will place this in the chart  NDC number - [address]-65  Lot number - [id number]  Expiration - 2025-08    Due to an issue with the EMR system, all of the information is noted above    Please follow-up in 2 weeks to get your next 50 mcg injection and then ultimately we will follow-up for your physical in 4 weeks from today to provide your next injection as well as continue monitoring lab work    At any point if you notice worsening or acute signs/symptoms please notify me immediately and/or go to nearest emergency room to be acutely evaluated

## 2024-07-30 LAB — PATH REVIEW-CBC DIFFERENTIAL: NORMAL

## 2024-07-30 NOTE — RESULT ENCOUNTER NOTE
Sugar, kidneys, electrolytes within normal limits other than protein being slightly elevated but I am not concerned    Complete blood cell count continues to show an elevated white blood cell count at 22.7 alongside a very elevated platelet cell count at 1880    I would recommend she provide these values to her hematologist in [place] and we will follow-up accordingly in 2 weeks with her next injection

## 2024-07-31 NOTE — RESULT ENCOUNTER NOTE
Pathologist did review her cells further    There is some abnormal findings that should be known by her hematologist    But I did recommend that we consider a hemoglobin electrophoresis study    I would recommend that she contact her hematologist to see if this would be an idea to do

## 2024-08-12 ENCOUNTER — APPOINTMENT (OUTPATIENT)
Dept: PRIMARY CARE | Facility: CLINIC | Age: 49
End: 2024-08-12
Payer: COMMERCIAL

## 2024-08-12 DIAGNOSIS — I26.99 MULTIPLE PULMONARY EMBOLI (MULTI): ICD-10-CM

## 2024-08-12 DIAGNOSIS — R00.0 TACHYCARDIA: ICD-10-CM

## 2024-08-12 DIAGNOSIS — D47.3 ESSENTIAL (HEMORRHAGIC) THROMBOCYTHEMIA (MULTI): Primary | ICD-10-CM

## 2024-08-12 DIAGNOSIS — R79.89 ABNORMAL CBC: ICD-10-CM

## 2024-08-12 PROCEDURE — 96372 THER/PROPH/DIAG INJ SC/IM: CPT | Performed by: STUDENT IN AN ORGANIZED HEALTH CARE EDUCATION/TRAINING PROGRAM

## 2024-08-12 NOTE — PROGRESS NOTES
Besremi subcutaneous injection was administered in the office today without complication by Cesilia Andrade MA. Injection provided by patient.

## 2024-08-26 ENCOUNTER — LAB (OUTPATIENT)
Dept: LAB | Facility: LAB | Age: 49
End: 2024-08-26
Payer: COMMERCIAL

## 2024-08-26 ENCOUNTER — APPOINTMENT (OUTPATIENT)
Dept: PRIMARY CARE | Facility: CLINIC | Age: 49
End: 2024-08-26
Payer: COMMERCIAL

## 2024-08-26 VITALS
HEART RATE: 130 BPM | WEIGHT: 126 LBS | SYSTOLIC BLOOD PRESSURE: 118 MMHG | HEIGHT: 65 IN | BODY MASS INDEX: 20.99 KG/M2 | DIASTOLIC BLOOD PRESSURE: 76 MMHG

## 2024-08-26 DIAGNOSIS — R00.0 TACHYCARDIA: ICD-10-CM

## 2024-08-26 DIAGNOSIS — Z00.00 HEALTHCARE MAINTENANCE: Primary | ICD-10-CM

## 2024-08-26 DIAGNOSIS — D47.3 ESSENTIAL (HEMORRHAGIC) THROMBOCYTHEMIA (MULTI): ICD-10-CM

## 2024-08-26 DIAGNOSIS — Z12.31 ENCOUNTER FOR SCREENING MAMMOGRAM FOR MALIGNANT NEOPLASM OF BREAST: ICD-10-CM

## 2024-08-26 DIAGNOSIS — R79.89 ABNORMAL CBC: ICD-10-CM

## 2024-08-26 DIAGNOSIS — Z12.11 COLON CANCER SCREENING: ICD-10-CM

## 2024-08-26 DIAGNOSIS — E78.2 MIXED HYPERLIPIDEMIA: ICD-10-CM

## 2024-08-26 DIAGNOSIS — Z00.00 HEALTHCARE MAINTENANCE: ICD-10-CM

## 2024-08-26 DIAGNOSIS — I26.99 MULTIPLE PULMONARY EMBOLI (MULTI): ICD-10-CM

## 2024-08-26 LAB
ALBUMIN SERPL BCP-MCNC: 4.2 G/DL (ref 3.4–5)
ALP SERPL-CCNC: 90 U/L (ref 33–110)
ALT SERPL W P-5'-P-CCNC: 8 U/L (ref 7–45)
ANION GAP SERPL CALC-SCNC: 12 MMOL/L (ref 10–20)
AST SERPL W P-5'-P-CCNC: 19 U/L (ref 9–39)
BASOPHILS # BLD AUTO: 0.24 X10*3/UL (ref 0–0.1)
BASOPHILS NFR BLD AUTO: 1.2 %
BILIRUB SERPL-MCNC: 0.8 MG/DL (ref 0–1.2)
BUN SERPL-MCNC: 17 MG/DL (ref 6–23)
BURR CELLS BLD QL SMEAR: NORMAL
CALCIUM SERPL-MCNC: 9.5 MG/DL (ref 8.6–10.3)
CHLORIDE SERPL-SCNC: 103 MMOL/L (ref 98–107)
CHOLEST SERPL-MCNC: 212 MG/DL (ref 0–199)
CHOLESTEROL/HDL RATIO: 4.3
CO2 SERPL-SCNC: 27 MMOL/L (ref 21–32)
CREAT SERPL-MCNC: 0.74 MG/DL (ref 0.5–1.05)
EGFRCR SERPLBLD CKD-EPI 2021: >90 ML/MIN/1.73M*2
EOSINOPHIL # BLD AUTO: 0.96 X10*3/UL (ref 0–0.7)
EOSINOPHIL NFR BLD AUTO: 4.9 %
ERYTHROCYTE [DISTWIDTH] IN BLOOD BY AUTOMATED COUNT: 21.4 % (ref 11.5–14.5)
EST. AVERAGE GLUCOSE BLD GHB EST-MCNC: 126 MG/DL
GLUCOSE SERPL-MCNC: 92 MG/DL (ref 74–99)
HBA1C MFR BLD: 6 %
HCT VFR BLD AUTO: 44.2 % (ref 36–46)
HCV AB SER QL: NONREACTIVE
HDLC SERPL-MCNC: 49.1 MG/DL
HGB BLD-MCNC: 13.8 G/DL (ref 12–16)
IMM GRANULOCYTES # BLD AUTO: 0.08 X10*3/UL (ref 0–0.7)
IMM GRANULOCYTES NFR BLD AUTO: 0.4 % (ref 0–0.9)
LDLC SERPL CALC-MCNC: 145 MG/DL
LYMPHOCYTES # BLD AUTO: 4.15 X10*3/UL (ref 1.2–4.8)
LYMPHOCYTES NFR BLD AUTO: 21.2 %
MCH RBC QN AUTO: 24.9 PG (ref 26–34)
MCHC RBC AUTO-ENTMCNC: 31.2 G/DL (ref 32–36)
MCV RBC AUTO: 80 FL (ref 80–100)
MONOCYTES # BLD AUTO: 1.4 X10*3/UL (ref 0.1–1)
MONOCYTES NFR BLD AUTO: 7.2 %
NEUTROPHILS # BLD AUTO: 12.73 X10*3/UL (ref 1.2–7.7)
NEUTROPHILS NFR BLD AUTO: 65.1 %
NON HDL CHOLESTEROL: 163 MG/DL (ref 0–149)
NRBC BLD-RTO: 0 /100 WBCS (ref 0–0)
OVALOCYTES BLD QL SMEAR: NORMAL
PLATELET # BLD AUTO: 1609 X10*3/UL (ref 150–450)
POLYCHROMASIA BLD QL SMEAR: NORMAL
POTASSIUM SERPL-SCNC: 3.6 MMOL/L (ref 3.5–5.3)
PROT SERPL-MCNC: 7.9 G/DL (ref 6.4–8.2)
RBC # BLD AUTO: 5.55 X10*6/UL (ref 4–5.2)
RBC MORPH BLD: NORMAL
SCHISTOCYTES BLD QL SMEAR: NORMAL
SODIUM SERPL-SCNC: 138 MMOL/L (ref 136–145)
TRIGL SERPL-MCNC: 91 MG/DL (ref 0–149)
TSH SERPL-ACNC: 2.12 MIU/L (ref 0.44–3.98)
VLDL: 18 MG/DL (ref 0–40)
WBC # BLD AUTO: 19.6 X10*3/UL (ref 4.4–11.3)

## 2024-08-26 PROCEDURE — 86803 HEPATITIS C AB TEST: CPT

## 2024-08-26 PROCEDURE — 96372 THER/PROPH/DIAG INJ SC/IM: CPT | Performed by: STUDENT IN AN ORGANIZED HEALTH CARE EDUCATION/TRAINING PROGRAM

## 2024-08-26 PROCEDURE — 36415 COLL VENOUS BLD VENIPUNCTURE: CPT

## 2024-08-26 PROCEDURE — 99396 PREV VISIT EST AGE 40-64: CPT | Performed by: STUDENT IN AN ORGANIZED HEALTH CARE EDUCATION/TRAINING PROGRAM

## 2024-08-26 PROCEDURE — 87389 HIV-1 AG W/HIV-1&-2 AB AG IA: CPT

## 2024-08-26 PROCEDURE — 83036 HEMOGLOBIN GLYCOSYLATED A1C: CPT

## 2024-08-26 PROCEDURE — 80053 COMPREHEN METABOLIC PANEL: CPT

## 2024-08-26 PROCEDURE — 85025 COMPLETE CBC W/AUTO DIFF WBC: CPT

## 2024-08-26 PROCEDURE — 1036F TOBACCO NON-USER: CPT | Performed by: STUDENT IN AN ORGANIZED HEALTH CARE EDUCATION/TRAINING PROGRAM

## 2024-08-26 PROCEDURE — 93000 ELECTROCARDIOGRAM COMPLETE: CPT | Performed by: STUDENT IN AN ORGANIZED HEALTH CARE EDUCATION/TRAINING PROGRAM

## 2024-08-26 PROCEDURE — 3008F BODY MASS INDEX DOCD: CPT | Performed by: STUDENT IN AN ORGANIZED HEALTH CARE EDUCATION/TRAINING PROGRAM

## 2024-08-26 PROCEDURE — 84443 ASSAY THYROID STIM HORMONE: CPT

## 2024-08-26 PROCEDURE — 80061 LIPID PANEL: CPT

## 2024-08-26 PROCEDURE — 99213 OFFICE O/P EST LOW 20 MIN: CPT | Performed by: STUDENT IN AN ORGANIZED HEALTH CARE EDUCATION/TRAINING PROGRAM

## 2024-08-26 ASSESSMENT — PATIENT HEALTH QUESTIONNAIRE - PHQ9
1. LITTLE INTEREST OR PLEASURE IN DOING THINGS: NOT AT ALL
2. FEELING DOWN, DEPRESSED OR HOPELESS: NOT AT ALL
SUM OF ALL RESPONSES TO PHQ9 QUESTIONS 1 AND 2: 0

## 2024-08-26 NOTE — PROGRESS NOTES
"Subjective   Patient ID: Elise Chávez is a 48 y.o. female who presents for Annual Exam.  Today she is accompanied by alone.     HPI  Health Maintenance   Overall patient is doing well.   Immunization: Tdap declines at this time   COVID-19 vaccine declines at this time   Colon Cancer Screening: No family history  OB/GYN: Sees Dr. Nails mammogram done 2022   Diet: attempts to eat a well balanced diet   Exercise: attempts to exercise   Tobacco: Denies use  EtOH: Rarely Socially     2. Chronic History of Pulmonary Emboli   Was in [place] and noted some shortness of breath and some hemoptysis   he had a a thrombectomy to have a large clot removed as well as older clots on 5/15/2024 in [place]   Was on Pradaxa, Eliquis, lorazepam   However, discontinued all medications recommended by previous provider   Continue to follow with her hematologist and even started hydroxychloroquine earlier last month   She then was recommended to start a new medication called \"BESREMi\" to be taken every 2 weeks at 50 mcg with the hope of getting her off of the hydroxychloroquine   Patient received injection 2 weeks ago following up for another injection  She will be following up with her hematologist at the end of the week to discuss her lab work as well as efficacy/tolerability      Current Outpatient Medications on File Prior to Visit   Medication Sig Dispense Refill    apixaban (Eliquis) 5 mg tablet Take 1 tablet (5 mg) by mouth every 12 hours. 180 tablet 0    cyclobenzaprine (Flexeril) 10 mg tablet Take 1 tablet (10 mg) by mouth once daily at bedtime. 30 tablet 0    LORazepam (Ativan) 1 mg tablet Take 1 tablet (1 mg) by mouth once daily.      omeprazole (PriLOSEC) 20 mg DR capsule Take 1 capsule (20 mg) by mouth once daily.      ondansetron (Zofran) 8 mg tablet Take 1 tablet (8 mg) by mouth every 8 hours if needed for nausea.      pyridostigmine (Mestinon) 60 mg tablet       [DISCONTINUED] hydroxychloroquine (Plaquenil) 200 mg tablet " "Take 1 tablet (200 mg) by mouth early in the morning..       No current facility-administered medications on file prior to visit.        No Known Allergies      There is no immunization history on file for this patient.      Review of Systems  All pertinent positive symptoms are included in the history of present illness.  All other systems have been reviewed and are negative and noncontributory to this patient's current ailments.     Objective   /76   Pulse (!) 130   Ht 1.651 m (5' 5\")   Wt 57.2 kg (126 lb)   BMI 20.97 kg/m²   BSA: 1.62 meters squared  Lab on 07/29/2024   Component Date Value Ref Range Status    WBC 07/29/2024 22.7 (H)  4.4 - 11.3 x10*3/uL Final    nRBC 07/29/2024 0.0  0.0 - 0.0 /100 WBCs Final    RBC 07/29/2024 5.71 (H)  4.00 - 5.20 x10*6/uL Final    Hemoglobin 07/29/2024 14.2  12.0 - 16.0 g/dL Final    Hematocrit 07/29/2024 45.7  36.0 - 46.0 % Final    MCV 07/29/2024 80  80 - 100 fL Final    MCH 07/29/2024 24.9 (L)  26.0 - 34.0 pg Final    MCHC 07/29/2024 31.1 (L)  32.0 - 36.0 g/dL Final    RDW 07/29/2024 17.9 (H)  11.5 - 14.5 % Final    Platelets 07/29/2024 1,880 (H)  150 - 450 x10*3/uL Final    Neutrophils % 07/29/2024 69.3  40.0 - 80.0 % Final    Immature Granulocytes %, Automated 07/29/2024 0.6  0.0 - 0.9 % Final    Immature Granulocyte Count (IG) includes promyelocytes, myelocytes and metamyelocytes but does not include bands. Percent differential counts (%) should be interpreted in the context of the absolute cell counts (cells/UL).    Lymphocytes % 07/29/2024 17.5  13.0 - 44.0 % Final    Monocytes % 07/29/2024 6.3  2.0 - 10.0 % Final    Eosinophils % 07/29/2024 5.0  0.0 - 6.0 % Final    Basophils % 07/29/2024 1.3  0.0 - 2.0 % Final    Neutrophils Absolute 07/29/2024 15.71 (H)  1.20 - 7.70 x10*3/uL Final    Percent differential counts (%) should be interpreted in the context of the absolute cell counts (cells/uL).    Immature Granulocytes Absolute, Au* 07/29/2024 0.13  0.00 - 0.70 " x10*3/uL Final    Lymphocytes Absolute 07/29/2024 3.98  1.20 - 4.80 x10*3/uL Final    Monocytes Absolute 07/29/2024 1.44 (H)  0.10 - 1.00 x10*3/uL Final    Eosinophils Absolute 07/29/2024 1.13 (H)  0.00 - 0.70 x10*3/uL Final    Basophils Absolute 07/29/2024 0.30 (H)  0.00 - 0.10 x10*3/uL Final    Glucose 07/29/2024 87  74 - 99 mg/dL Final    Sodium 07/29/2024 137  136 - 145 mmol/L Final    Potassium 07/29/2024 4.1  3.5 - 5.3 mmol/L Final    Chloride 07/29/2024 100  98 - 107 mmol/L Final    Bicarbonate 07/29/2024 28  21 - 32 mmol/L Final    Anion Gap 07/29/2024 13  10 - 20 mmol/L Final    Urea Nitrogen 07/29/2024 18  6 - 23 mg/dL Final    Creatinine 07/29/2024 0.83  0.50 - 1.05 mg/dL Final    eGFR 07/29/2024 88  >60 mL/min/1.73m*2 Final    Calculations of estimated GFR are performed using the 2021 CKD-EPI Study Refit equation without the race variable for the IDMS-Traceable creatinine methods.  https://jasn.asnjournals.org/content/early/2021/09/22/ASN.9874014878    Calcium 07/29/2024 10.1  8.6 - 10.3 mg/dL Final    Albumin 07/29/2024 4.4  3.4 - 5.0 g/dL Final    Alkaline Phosphatase 07/29/2024 90  33 - 110 U/L Final    Total Protein 07/29/2024 8.4 (H)  6.4 - 8.2 g/dL Final    AST 07/29/2024 20  9 - 39 U/L Final    Bilirubin, Total 07/29/2024 0.6  0.0 - 1.2 mg/dL Final    ALT 07/29/2024 12  7 - 45 U/L Final    Patients treated with Sulfasalazine may generate falsely decreased results for ALT.    Pathologist Review-CBC Differential 07/29/2024    Final                    Value:White Blood Cells: No blasts seen while scanning.  Red blood cells: Anisopoikilocytosis: [place] cells and rare ovalo/ elliptocytes bordering on sickle cells are seen. Consider hemoglobin electrophoresis study.    Platelets:  Thrombocytosis.    Further clinical correlation and adequate follow up are recommended.     Electronically signed out by Marisabel Castillo MD on 7/30/24 at 7:52 PM.  By the signature on this report, the individual or group  listed as making the Final Interpretation/Diagnosis certifies that they have reviewed this case.    RBC Morphology 07/29/2024 See Below   Final    Ovalocytes 07/29/2024 Few   Final    Acanthocytes 07/29/2024 Few   Final       Physical Exam  CONSTITUTIONAL - well nourished, well developed, looks like stated age, in no acute distress, not ill-appearing, and not tired appearing  SKIN - normal skin color and pigmentation, normal skin turgor without rash, lesions, or nodules visualized  HEAD - no trauma, normocephalic  EYES - normal external exam  CHEST -no distressed breathing, good effort, heart tachycardic, no murmurs, lungs clear to auscultation bilaterally  EXTREMITIES - no edema, no deformities  NEUROLOGICAL - normal balance, normal motor, no ataxia  PSYCHIATRIC - alert, pleasant and cordial, age-appropriate      Assessment/Plan   Health Maintenance   Complete history and physical examination was performed  EKG reveals sinus tachycardia with some left atrial enlargement and nonspecific changes  We will notify of test results once available and make treatment recommendations accordingly  Mammogram and colonoscopy ordered to be done at your earliest mutual convenience in the case of you decide to get them done as they are recommended  Please follow-up with your OB/GYN for well woman visits    2.  Chronic history of pulmonary emboli  Injection was provided to you today I will provide your next 1 in 2 weeks  Please follow-up with your hematology/oncology provider for further recommendation/treatment options  Lab work was placed in the chart and we will notify of the results and make recommendations accordingly    We had a long conversation about your signs/symptoms I truly believe that there could be something else underlying  I understand that you only have some minor shortness of breath but I would recommend going to the emergency room if you notice acute or worsening signs/symptoms  We discussed this at length

## 2024-08-27 LAB — HIV 1+2 AB+HIV1 P24 AG SERPL QL IA: NONREACTIVE

## 2024-08-27 NOTE — RESULT ENCOUNTER NOTE
Cholesterol did improve down towards 212, HDL 49, , triglycerides 91    Hemoglobin A1c noted some prediabetes at 6.0%    Complete blood cell count continues to show an elevated white blood cell count as well as continued platelet cell counts that are elevated  It did decrease slightly    I would recommend she discusses this with her hematologist    HIV and hep C are negative    Sugar, kidneys, liver, electrolytes are all within normal limits    Thyroid within normal limits

## 2024-08-29 DIAGNOSIS — K51.919 ULCERATIVE COLITIS WITH COMPLICATION, UNSPECIFIED LOCATION (MULTI): Primary | ICD-10-CM

## 2024-08-29 RX ORDER — SOD SULF/POT CHLORIDE/MAG SULF 1.479 G
TABLET ORAL
Qty: 24 TABLET | Refills: 0 | Status: SHIPPED | OUTPATIENT
Start: 2024-08-29

## 2024-09-06 ENCOUNTER — HOSPITAL ENCOUNTER (OUTPATIENT)
Dept: RADIOLOGY | Facility: CLINIC | Age: 49
Discharge: HOME | End: 2024-09-06
Payer: COMMERCIAL

## 2024-09-06 VITALS — BODY MASS INDEX: 21.01 KG/M2 | HEIGHT: 65 IN | WEIGHT: 126.1 LBS

## 2024-09-06 DIAGNOSIS — Z12.31 ENCOUNTER FOR SCREENING MAMMOGRAM FOR MALIGNANT NEOPLASM OF BREAST: ICD-10-CM

## 2024-09-06 PROCEDURE — 77067 SCR MAMMO BI INCL CAD: CPT

## 2024-09-09 ENCOUNTER — APPOINTMENT (OUTPATIENT)
Dept: PRIMARY CARE | Facility: CLINIC | Age: 49
End: 2024-09-09
Payer: COMMERCIAL

## 2024-09-09 ENCOUNTER — LAB (OUTPATIENT)
Dept: LAB | Facility: LAB | Age: 49
End: 2024-09-09
Payer: COMMERCIAL

## 2024-09-09 DIAGNOSIS — R00.0 TACHYCARDIA: ICD-10-CM

## 2024-09-09 DIAGNOSIS — R79.89 ABNORMAL CBC: ICD-10-CM

## 2024-09-09 DIAGNOSIS — D47.3 ESSENTIAL (HEMORRHAGIC) THROMBOCYTHEMIA (MULTI): ICD-10-CM

## 2024-09-09 DIAGNOSIS — I26.99 MULTIPLE PULMONARY EMBOLI (MULTI): ICD-10-CM

## 2024-09-09 DIAGNOSIS — D47.3 ESSENTIAL (HEMORRHAGIC) THROMBOCYTHEMIA (MULTI): Primary | ICD-10-CM

## 2024-09-09 LAB
BASOPHILS # BLD AUTO: 0.26 X10*3/UL (ref 0–0.1)
BASOPHILS NFR BLD AUTO: 1.3 %
BURR CELLS BLD QL SMEAR: NORMAL
EOSINOPHIL # BLD AUTO: 1.16 X10*3/UL (ref 0–0.7)
EOSINOPHIL NFR BLD AUTO: 5.9 %
ERYTHROCYTE [DISTWIDTH] IN BLOOD BY AUTOMATED COUNT: 22.3 % (ref 11.5–14.5)
HCT VFR BLD AUTO: 46.5 % (ref 36–46)
HGB BLD-MCNC: 14.1 G/DL (ref 12–16)
IMM GRANULOCYTES # BLD AUTO: 0.07 X10*3/UL (ref 0–0.7)
IMM GRANULOCYTES NFR BLD AUTO: 0.4 % (ref 0–0.9)
LYMPHOCYTES # BLD AUTO: 3.71 X10*3/UL (ref 1.2–4.8)
LYMPHOCYTES NFR BLD AUTO: 18.9 %
MCH RBC QN AUTO: 24.4 PG (ref 26–34)
MCHC RBC AUTO-ENTMCNC: 30.3 G/DL (ref 32–36)
MCV RBC AUTO: 80 FL (ref 80–100)
MONOCYTES # BLD AUTO: 1.3 X10*3/UL (ref 0.1–1)
MONOCYTES NFR BLD AUTO: 6.6 %
NEUTROPHILS # BLD AUTO: 13.18 X10*3/UL (ref 1.2–7.7)
NEUTROPHILS NFR BLD AUTO: 66.9 %
NRBC BLD-RTO: 0 /100 WBCS (ref 0–0)
OVALOCYTES BLD QL SMEAR: NORMAL
PLATELET # BLD AUTO: 1757 X10*3/UL (ref 150–450)
RBC # BLD AUTO: 5.79 X10*6/UL (ref 4–5.2)
RBC MORPH BLD: NORMAL
SCHISTOCYTES BLD QL SMEAR: NORMAL
WBC # BLD AUTO: 19.7 X10*3/UL (ref 4.4–11.3)

## 2024-09-09 PROCEDURE — 96372 THER/PROPH/DIAG INJ SC/IM: CPT | Performed by: STUDENT IN AN ORGANIZED HEALTH CARE EDUCATION/TRAINING PROGRAM

## 2024-09-09 PROCEDURE — 36415 COLL VENOUS BLD VENIPUNCTURE: CPT

## 2024-09-09 PROCEDURE — 85025 COMPLETE CBC W/AUTO DIFF WBC: CPT

## 2024-09-09 PROCEDURE — 99213 OFFICE O/P EST LOW 20 MIN: CPT | Performed by: STUDENT IN AN ORGANIZED HEALTH CARE EDUCATION/TRAINING PROGRAM

## 2024-09-09 NOTE — PROGRESS NOTES
"Subjective   Patient ID: Elise Chávez is a 48 y.o. female who presents for a follow-up  Today she is accompanied by alone.     HPI  1. Chronic History of Pulmonary Emboli   Was in [place] and noted some shortness of breath and some hemoptysis   he had a a thrombectomy to have a large clot removed as well as older clots on 5/15/2024 in [place]   Was on Pradaxa, Eliquis, lorazepam   However, discontinued all medications recommended by previous provider   Continue to follow with her hematologist and even started hydroxychloroquine earlier last month   She then was recommended to start a new medication called \"BESREMi\" to be taken every 2 weeks at 50 mcg with the hope of getting her off of the hydroxychloroquine   Patient received injection 2 weeks ago following up for another injection  Discussed this with her hematologist about 2 weeks ago and now he is requesting a CBC with a CMP today and ultimately another CBC within the next 2 to 4 weeks    Platelet count did drop down slightly as noted in the chart        Current Outpatient Medications on File Prior to Visit   Medication Sig Dispense Refill    apixaban (Eliquis) 5 mg tablet Take 1 tablet (5 mg) by mouth every 12 hours. 180 tablet 0    cyclobenzaprine (Flexeril) 10 mg tablet Take 1 tablet (10 mg) by mouth once daily at bedtime. 30 tablet 0    [DISCONTINUED] LORazepam (Ativan) 1 mg tablet Take 1 tablet (1 mg) by mouth once daily.      [DISCONTINUED] omeprazole (PriLOSEC) 20 mg DR capsule Take 1 capsule (20 mg) by mouth once daily.      [DISCONTINUED] ondansetron (Zofran) 8 mg tablet Take 1 tablet (8 mg) by mouth every 8 hours if needed for nausea.      [DISCONTINUED] pyridostigmine (Mestinon) 60 mg tablet       [DISCONTINUED] sod sulf-pot chloride-mag sulf (Sutab) 1.479-0.188- 0.225 gram tablet Starting at 6pm open one bottle of pills and fill glass provided with water and drink according to prep sheet. Start the 2nd bottle with directions on prep sheet 5 hours " before procedure time 24 tablet 0     No current facility-administered medications on file prior to visit.        No Known Allergies      There is no immunization history on file for this patient.      Review of Systems  All pertinent positive symptoms are included in the history of present illness.  All other systems have been reviewed and are negative and noncontributory to this patient's current ailments.     Objective   LMP  (LMP Unknown)   BSA: There is no height or weight on file to calculate BSA.  Lab on 08/26/2024   Component Date Value Ref Range Status    Thyroid Stimulating Hormone 08/26/2024 2.12  0.44 - 3.98 mIU/L Final    Cholesterol 08/26/2024 212 (H)  0 - 199 mg/dL Final          Age      Desirable   Borderline High   High     0-19 Y     0 - 169       170 - 199     >/= 200    20-24 Y     0 - 189       190 - 224     >/= 225         >24 Y     0 - 199       200 - 239     >/= 240   **All ranges are based on fasting samples. Specific   therapeutic targets will vary based on patient-specific   cardiac risk.    Pediatric guidelines reference:Pediatrics 2011, 128(S5).Adult guidelines reference: NCEP ATPIII Guidelines,GERARDO 2001, 258:2486-97    Venipuncture immediately after or during the administration of Metamizole may lead to falsely low results. Testing should be performed immediately prior to Metamizole dosing.    HDL-Cholesterol 08/26/2024 49.1  mg/dL Final      Age       Very Low   Low     Normal    High    0-19 Y    < 35      < 40     40-45     ----  20-24 Y    ----     < 40      >45      ----        >24 Y      ----     < 40     40-60      >60      Cholesterol/HDL Ratio 08/26/2024 4.3   Final      Ref Values  Desirable  < 3.4  High Risk  > 5.0    LDL Calculated 08/26/2024 145 (H)  <=99 mg/dL Final                                Near   Borderline      AGE      Desirable  Optimal    High     High     Very High     0-19 Y     0 - 109     ---    110-129   >/= 130     ----    20-24 Y     0 - 119     ---     120-159   >/= 160     ----      >24 Y     0 -  99   100-129  130-159   160-189     >/=190      VLDL 08/26/2024 18  0 - 40 mg/dL Final    Triglycerides 08/26/2024 91  0 - 149 mg/dL Final       Age         Desirable   Borderline High   High     Very High   0 D-90 D    19 - 174         ----         ----        ----  91 D- 9 Y     0 -  74        75 -  99     >/= 100      ----    10-19 Y     0 -  89        90 - 129     >/= 130      ----    20-24 Y     0 - 114       115 - 149     >/= 150      ----         >24 Y     0 - 149       150 - 199    200- 499    >/= 500    Venipuncture immediately after or during the administration of Metamizole may lead to falsely low results. Testing should be performed immediately prior to Metamizole dosing.    Non HDL Cholesterol 08/26/2024 163 (H)  0 - 149 mg/dL Final          Age       Desirable   Borderline High   High     Very High     0-19 Y     0 - 119       120 - 144     >/= 145    >/= 160    20-24 Y     0 - 149       150 - 189     >/= 190      ----         >24 Y    30 mg/dL above LDL Cholesterol goal      Glucose 08/26/2024 92  74 - 99 mg/dL Final    Sodium 08/26/2024 138  136 - 145 mmol/L Final    Potassium 08/26/2024 3.6  3.5 - 5.3 mmol/L Final    Chloride 08/26/2024 103  98 - 107 mmol/L Final    Bicarbonate 08/26/2024 27  21 - 32 mmol/L Final    Anion Gap 08/26/2024 12  10 - 20 mmol/L Final    Urea Nitrogen 08/26/2024 17  6 - 23 mg/dL Final    Creatinine 08/26/2024 0.74  0.50 - 1.05 mg/dL Final    eGFR 08/26/2024 >90  >60 mL/min/1.73m*2 Final    Calculations of estimated GFR are performed using the 2021 CKD-EPI Study Refit equation without the race variable for the IDMS-Traceable creatinine methods.  https://jasn.asnjournals.org/content/early/2021/09/22/ASN.0005660734    Calcium 08/26/2024 9.5  8.6 - 10.3 mg/dL Final    Albumin 08/26/2024 4.2  3.4 - 5.0 g/dL Final    Alkaline Phosphatase 08/26/2024 90  33 - 110 U/L Final    Total Protein 08/26/2024 7.9  6.4 - 8.2 g/dL Final    AST  08/26/2024 19  9 - 39 U/L Final    Bilirubin, Total 08/26/2024 0.8  0.0 - 1.2 mg/dL Final    ALT 08/26/2024 8  7 - 45 U/L Final    Patients treated with Sulfasalazine may generate falsely decreased results for ALT.    WBC 08/26/2024 19.6 (H)  4.4 - 11.3 x10*3/uL Final    nRBC 08/26/2024 0.0  0.0 - 0.0 /100 WBCs Final    RBC 08/26/2024 5.55 (H)  4.00 - 5.20 x10*6/uL Final    Hemoglobin 08/26/2024 13.8  12.0 - 16.0 g/dL Final    Hematocrit 08/26/2024 44.2  36.0 - 46.0 % Final    MCV 08/26/2024 80  80 - 100 fL Final    MCH 08/26/2024 24.9 (L)  26.0 - 34.0 pg Final    MCHC 08/26/2024 31.2 (L)  32.0 - 36.0 g/dL Final    RDW 08/26/2024 21.4 (H)  11.5 - 14.5 % Final    Platelets 08/26/2024 1,609 (H)  150 - 450 x10*3/uL Final    Neutrophils % 08/26/2024 65.1  40.0 - 80.0 % Final    Immature Granulocytes %, Automated 08/26/2024 0.4  0.0 - 0.9 % Final    Immature Granulocyte Count (IG) includes promyelocytes, myelocytes and metamyelocytes but does not include bands. Percent differential counts (%) should be interpreted in the context of the absolute cell counts (cells/UL).    Lymphocytes % 08/26/2024 21.2  13.0 - 44.0 % Final    Monocytes % 08/26/2024 7.2  2.0 - 10.0 % Final    Eosinophils % 08/26/2024 4.9  0.0 - 6.0 % Final    Basophils % 08/26/2024 1.2  0.0 - 2.0 % Final    Neutrophils Absolute 08/26/2024 12.73 (H)  1.20 - 7.70 x10*3/uL Final    Percent differential counts (%) should be interpreted in the context of the absolute cell counts (cells/uL).    Immature Granulocytes Absolute, Au* 08/26/2024 0.08  0.00 - 0.70 x10*3/uL Final    Lymphocytes Absolute 08/26/2024 4.15  1.20 - 4.80 x10*3/uL Final    Monocytes Absolute 08/26/2024 1.40 (H)  0.10 - 1.00 x10*3/uL Final    Eosinophils Absolute 08/26/2024 0.96 (H)  0.00 - 0.70 x10*3/uL Final    Basophils Absolute 08/26/2024 0.24 (H)  0.00 - 0.10 x10*3/uL Final    Hemoglobin A1C 08/26/2024 6.0 (H)  see below % Final    Estimated Average Glucose 08/26/2024 126  Not Established  mg/dL Final    HIV 1/2 Antigen/Antibody Screen wi* 08/26/2024 Nonreactive  Nonreactive Final    Hepatitis C AB 08/26/2024 Nonreactive  Nonreactive Final    Results from patients taking biotin supplements or receiving high-dose biotin therapy should be interpreted with caution due to possible interference with this test. Providers may contact their local laboratory for further information.    RBC Morphology 08/26/2024 See Below   Final    Polychromasia 08/26/2024 Mild   Final    RBC Fragments 08/26/2024 Few   Final    Ovalocytes 08/26/2024 Many   Final    [place] Cells 08/26/2024 Few   Final       Physical Exam  CONSTITUTIONAL - well nourished, well developed, looks like stated age, in no acute distress, not ill-appearing, and not tired appearing  SKIN - normal skin color and pigmentation, normal skin turgor without rash, lesions, or nodules visualized  HEAD - no trauma, normocephalic  EYES - normal external exam  CHEST -no distressed breathing, good effort, heart tachycardic, no murmurs, lungs clear to auscultation bilaterally  EXTREMITIES - no edema, no deformities  NEUROLOGICAL - normal balance, normal motor, no ataxia  PSYCHIATRIC - alert, pleasant and cordial, age-appropriate      Assessment/Plan   Chronic history of pulmonary emboli  Injection was provided to you today I will provide your next in 2 weeks  This was now at 100 mcg daily  Please follow-up with your hematology/oncology provider for further recommendation/treatment options  Lab work was placed in the chart and we will notify of the results and make recommendations accordingly    Continue to monitor signs/symptoms  I understand that you only have some minor shortness of breath but I would recommend going to the emergency room if you notice acute or worsening signs/symptoms  We discussed this at length

## 2024-09-10 NOTE — RESULT ENCOUNTER NOTE
Ova/parasite negative    I would recommend he continues monitoring signs/symptoms of this continues to be a problem, we would discuss a GI consult

## 2024-09-12 ENCOUNTER — HOSPITAL ENCOUNTER (INPATIENT)
Facility: HOSPITAL | Age: 49
LOS: 1 days | Discharge: HOME | End: 2024-09-13
Attending: EMERGENCY MEDICINE | Admitting: SURGERY
Payer: COMMERCIAL

## 2024-09-12 ENCOUNTER — APPOINTMENT (OUTPATIENT)
Dept: CARDIOLOGY | Facility: HOSPITAL | Age: 49
End: 2024-09-12
Payer: COMMERCIAL

## 2024-09-12 ENCOUNTER — APPOINTMENT (OUTPATIENT)
Dept: RADIOLOGY | Facility: HOSPITAL | Age: 49
End: 2024-09-12
Payer: COMMERCIAL

## 2024-09-12 ENCOUNTER — APPOINTMENT (OUTPATIENT)
Dept: VASCULAR MEDICINE | Facility: HOSPITAL | Age: 49
End: 2024-09-12
Payer: COMMERCIAL

## 2024-09-12 DIAGNOSIS — D47.3 ESSENTIAL (HEMORRHAGIC) THROMBOCYTHEMIA: ICD-10-CM

## 2024-09-12 DIAGNOSIS — I26.99 MULTIPLE PULMONARY EMBOLI (MULTI): ICD-10-CM

## 2024-09-12 DIAGNOSIS — I50.43 ACUTE ON CHRONIC COMBINED SYSTOLIC AND DIASTOLIC HEART FAILURE: ICD-10-CM

## 2024-09-12 DIAGNOSIS — Z86.711 PERSONAL HISTORY OF PULMONARY EMBOLISM: ICD-10-CM

## 2024-09-12 DIAGNOSIS — R00.0 TACHYCARDIA: Primary | ICD-10-CM

## 2024-09-12 DIAGNOSIS — I26.02 ACUTE SADDLE PULMONARY EMBOLISM WITH ACUTE COR PULMONALE (MULTI): ICD-10-CM

## 2024-09-12 LAB
ABO GROUP (TYPE) IN BLOOD: NORMAL
ALBUMIN SERPL BCP-MCNC: 4.2 G/DL (ref 3.4–5)
ALP SERPL-CCNC: 114 U/L (ref 33–110)
ALT SERPL W P-5'-P-CCNC: 13 U/L (ref 7–45)
ANION GAP SERPL CALC-SCNC: 16 MMOL/L (ref 10–20)
ANTIBODY SCREEN: NORMAL
AORTIC VALVE PEAK VELOCITY: 0.77 M/S
APTT PPP: 39 SECONDS (ref 27–38)
APTT PPP: 39 SECONDS (ref 27–38)
APTT PPP: 44 SECONDS (ref 27–38)
APTT PPP: 44 SECONDS (ref 27–38)
AST SERPL W P-5'-P-CCNC: 17 U/L (ref 9–39)
AV PEAK GRADIENT: 2.4 MMHG
BASOPHILS # BLD AUTO: 0.24 X10*3/UL (ref 0–0.1)
BASOPHILS NFR BLD AUTO: 1 %
BILIRUB SERPL-MCNC: 0.6 MG/DL (ref 0–1.2)
BNP SERPL-MCNC: 49 PG/ML (ref 0–99)
BUN SERPL-MCNC: 24 MG/DL (ref 6–23)
CALCIUM SERPL-MCNC: 9.9 MG/DL (ref 8.6–10.3)
CARDIAC TROPONIN I PNL SERPL HS: 13 NG/L (ref 0–13)
CARDIAC TROPONIN I PNL SERPL HS: 8 NG/L (ref 0–13)
CHLORIDE SERPL-SCNC: 101 MMOL/L (ref 98–107)
CO2 SERPL-SCNC: 27 MMOL/L (ref 21–32)
CREAT SERPL-MCNC: 1.02 MG/DL (ref 0.5–1.05)
EGFRCR SERPLBLD CKD-EPI 2021: 69 ML/MIN/1.73M*2
EJECTION FRACTION APICAL 4 CHAMBER: 34.9
EJECTION FRACTION: 35 %
EOSINOPHIL # BLD AUTO: 0.83 X10*3/UL (ref 0–0.7)
EOSINOPHIL NFR BLD AUTO: 3.6 %
ERYTHROCYTE [DISTWIDTH] IN BLOOD BY AUTOMATED COUNT: 21.6 % (ref 11.5–14.5)
ERYTHROCYTE [DISTWIDTH] IN BLOOD BY AUTOMATED COUNT: 21.9 % (ref 11.5–14.5)
GLUCOSE SERPL-MCNC: 94 MG/DL (ref 74–99)
HCT VFR BLD AUTO: 39.4 % (ref 36–46)
HCT VFR BLD AUTO: 44.6 % (ref 36–46)
HGB BLD-MCNC: 12.7 G/DL (ref 12–16)
HGB BLD-MCNC: 13.9 G/DL (ref 12–16)
IMM GRANULOCYTES # BLD AUTO: 0.12 X10*3/UL (ref 0–0.7)
IMM GRANULOCYTES NFR BLD AUTO: 0.5 % (ref 0–0.9)
INR PPP: 1.5 (ref 0.9–1.1)
LACTATE SERPL-SCNC: 1.3 MMOL/L (ref 0.4–2)
LEFT VENTRICLE INTERNAL DIMENSION DIASTOLE: 3.48 CM (ref 3.5–6)
LEFT VENTRICULAR OUTFLOW TRACT DIAMETER: 2.02 CM
LYMPHOCYTES # BLD AUTO: 4.37 X10*3/UL (ref 1.2–4.8)
LYMPHOCYTES NFR BLD AUTO: 18.8 %
MCH RBC QN AUTO: 24.5 PG (ref 26–34)
MCH RBC QN AUTO: 25 PG (ref 26–34)
MCHC RBC AUTO-ENTMCNC: 31.2 G/DL (ref 32–36)
MCHC RBC AUTO-ENTMCNC: 32.2 G/DL (ref 32–36)
MCV RBC AUTO: 77 FL (ref 80–100)
MCV RBC AUTO: 79 FL (ref 80–100)
MONOCYTES # BLD AUTO: 1.69 X10*3/UL (ref 0.1–1)
MONOCYTES NFR BLD AUTO: 7.3 %
NEUTROPHILS # BLD AUTO: 16.05 X10*3/UL (ref 1.2–7.7)
NEUTROPHILS NFR BLD AUTO: 68.8 %
NRBC BLD-RTO: 0 /100 WBCS (ref 0–0)
NRBC BLD-RTO: 0 /100 WBCS (ref 0–0)
PATH REVIEW-CBC DIFFERENTIAL: NORMAL
PLATELET # BLD AUTO: 1498 X10*3/UL (ref 150–450)
PLATELET # BLD AUTO: 1591 X10*3/UL (ref 150–450)
POTASSIUM SERPL-SCNC: 3.6 MMOL/L (ref 3.5–5.3)
PROT SERPL-MCNC: 8.4 G/DL (ref 6.4–8.2)
PROTHROMBIN TIME: 17.5 SECONDS (ref 9.8–12.8)
RBC # BLD AUTO: 5.09 X10*6/UL (ref 4–5.2)
RBC # BLD AUTO: 5.67 X10*6/UL (ref 4–5.2)
RH FACTOR (ANTIGEN D): NORMAL
RIGHT VENTRICLE PEAK SYSTOLIC PRESSURE: 80.8 MMHG
SODIUM SERPL-SCNC: 140 MMOL/L (ref 136–145)
TSH SERPL-ACNC: 2.19 MIU/L (ref 0.44–3.98)
UFH PPP CHRO-ACNC: 1.1 IU/ML
UFH PPP CHRO-ACNC: 1.5 IU/ML
WBC # BLD AUTO: 21.7 X10*3/UL (ref 4.4–11.3)
WBC # BLD AUTO: 23.3 X10*3/UL (ref 4.4–11.3)

## 2024-09-12 PROCEDURE — 2550000001 HC RX 255 CONTRASTS: Performed by: EMERGENCY MEDICINE

## 2024-09-12 PROCEDURE — 84443 ASSAY THYROID STIM HORMONE: CPT | Performed by: EMERGENCY MEDICINE

## 2024-09-12 PROCEDURE — 96365 THER/PROPH/DIAG IV INF INIT: CPT | Mod: 59

## 2024-09-12 PROCEDURE — 85610 PROTHROMBIN TIME: CPT | Performed by: EMERGENCY MEDICINE

## 2024-09-12 PROCEDURE — 85730 THROMBOPLASTIN TIME PARTIAL: CPT | Performed by: SURGERY

## 2024-09-12 PROCEDURE — 84484 ASSAY OF TROPONIN QUANT: CPT | Performed by: EMERGENCY MEDICINE

## 2024-09-12 PROCEDURE — 2500000004 HC RX 250 GENERAL PHARMACY W/ HCPCS (ALT 636 FOR OP/ED): Performed by: SURGERY

## 2024-09-12 PROCEDURE — 71275 CT ANGIOGRAPHY CHEST: CPT

## 2024-09-12 PROCEDURE — 85027 COMPLETE CBC AUTOMATED: CPT | Performed by: SURGERY

## 2024-09-12 PROCEDURE — 99291 CRITICAL CARE FIRST HOUR: CPT | Performed by: HOSPITALIST

## 2024-09-12 PROCEDURE — 93005 ELECTROCARDIOGRAM TRACING: CPT

## 2024-09-12 PROCEDURE — 99292 CRITICAL CARE ADDL 30 MIN: CPT | Performed by: EMERGENCY MEDICINE

## 2024-09-12 PROCEDURE — 85025 COMPLETE CBC W/AUTO DIFF WBC: CPT | Performed by: EMERGENCY MEDICINE

## 2024-09-12 PROCEDURE — 71275 CT ANGIOGRAPHY CHEST: CPT | Mod: FOREIGN READ | Performed by: RADIOLOGY

## 2024-09-12 PROCEDURE — 96366 THER/PROPH/DIAG IV INF ADDON: CPT

## 2024-09-12 PROCEDURE — 99291 CRITICAL CARE FIRST HOUR: CPT | Performed by: SURGERY

## 2024-09-12 PROCEDURE — 85520 HEPARIN ASSAY: CPT | Performed by: SURGERY

## 2024-09-12 PROCEDURE — 86850 RBC ANTIBODY SCREEN: CPT | Performed by: EMERGENCY MEDICINE

## 2024-09-12 PROCEDURE — 93308 TTE F-UP OR LMTD: CPT | Performed by: INTERNAL MEDICINE

## 2024-09-12 PROCEDURE — 2500000004 HC RX 250 GENERAL PHARMACY W/ HCPCS (ALT 636 FOR OP/ED)

## 2024-09-12 PROCEDURE — 83880 ASSAY OF NATRIURETIC PEPTIDE: CPT | Performed by: EMERGENCY MEDICINE

## 2024-09-12 PROCEDURE — 36415 COLL VENOUS BLD VENIPUNCTURE: CPT | Performed by: EMERGENCY MEDICINE

## 2024-09-12 PROCEDURE — 99223 1ST HOSP IP/OBS HIGH 75: CPT | Performed by: NURSE PRACTITIONER

## 2024-09-12 PROCEDURE — 80053 COMPREHEN METABOLIC PANEL: CPT | Performed by: EMERGENCY MEDICINE

## 2024-09-12 PROCEDURE — 85520 HEPARIN ASSAY: CPT | Performed by: PHARMACIST

## 2024-09-12 PROCEDURE — 99292 CRITICAL CARE ADDL 30 MIN: CPT | Performed by: SURGERY

## 2024-09-12 PROCEDURE — 83605 ASSAY OF LACTIC ACID: CPT | Performed by: EMERGENCY MEDICINE

## 2024-09-12 PROCEDURE — 93308 TTE F-UP OR LMTD: CPT

## 2024-09-12 PROCEDURE — 2020000001 HC ICU ROOM DAILY

## 2024-09-12 RX ORDER — HEPARIN SODIUM 10000 [USP'U]/100ML
1400 INJECTION, SOLUTION INTRAVENOUS CONTINUOUS
Status: DISCONTINUED | OUTPATIENT
Start: 2024-09-12 | End: 2024-09-13

## 2024-09-12 RX ORDER — HEPARIN SODIUM 10000 [USP'U]/100ML
INJECTION, SOLUTION INTRAVENOUS
Status: COMPLETED
Start: 2024-09-12 | End: 2024-09-12

## 2024-09-12 RX ORDER — HEPARIN SODIUM 10000 [USP'U]/100ML
0-4500 INJECTION, SOLUTION INTRAVENOUS CONTINUOUS
Status: DISCONTINUED | OUTPATIENT
Start: 2024-09-12 | End: 2024-09-12

## 2024-09-12 RX ORDER — ROPEGINTERFERON ALFA-2B 500 UG/ML
100 INJECTION SUBCUTANEOUS
COMMUNITY

## 2024-09-12 SDOH — SOCIAL STABILITY: SOCIAL INSECURITY: ARE THERE ANY APPARENT SIGNS OF INJURIES/BEHAVIORS THAT COULD BE RELATED TO ABUSE/NEGLECT?: NO

## 2024-09-12 SDOH — SOCIAL STABILITY: SOCIAL INSECURITY: DO YOU FEEL UNSAFE GOING BACK TO THE PLACE WHERE YOU ARE LIVING?: NO

## 2024-09-12 SDOH — SOCIAL STABILITY: SOCIAL INSECURITY: DO YOU FEEL ANYONE HAS EXPLOITED OR TAKEN ADVANTAGE OF YOU FINANCIALLY OR OF YOUR PERSONAL PROPERTY?: NO

## 2024-09-12 SDOH — SOCIAL STABILITY: SOCIAL INSECURITY: ABUSE: ADULT

## 2024-09-12 SDOH — SOCIAL STABILITY: SOCIAL INSECURITY: HAVE YOU HAD THOUGHTS OF HARMING ANYONE ELSE?: NO

## 2024-09-12 SDOH — SOCIAL STABILITY: SOCIAL INSECURITY: DOES ANYONE TRY TO KEEP YOU FROM HAVING/CONTACTING OTHER FRIENDS OR DOING THINGS OUTSIDE YOUR HOME?: NO

## 2024-09-12 SDOH — SOCIAL STABILITY: SOCIAL INSECURITY: ARE YOU OR HAVE YOU BEEN THREATENED OR ABUSED PHYSICALLY, EMOTIONALLY, OR SEXUALLY BY ANYONE?: NO

## 2024-09-12 SDOH — SOCIAL STABILITY: SOCIAL INSECURITY: HAS ANYONE EVER THREATENED TO HURT YOUR FAMILY OR YOUR PETS?: NO

## 2024-09-12 ASSESSMENT — ENCOUNTER SYMPTOMS
LIGHT-HEADEDNESS: 0
APPETITE CHANGE: 0
SHORTNESS OF BREATH: 1
COUGH: 1
HEADACHES: 0
FATIGUE: 0
DYSURIA: 0
ALLERGIC/IMMUNOLOGIC NEGATIVE: 1
NAUSEA: 0
ARTHRALGIAS: 0
MUSCULOSKELETAL NEGATIVE: 1
NEUROLOGICAL NEGATIVE: 1
EYES NEGATIVE: 1
BLOOD IN STOOL: 0
ACTIVITY CHANGE: 0
VOMITING: 0
PSYCHIATRIC NEGATIVE: 1
ENDOCRINE NEGATIVE: 1
HEMATURIA: 0
CHILLS: 0
HEMATOLOGIC/LYMPHATIC NEGATIVE: 1
ADENOPATHY: 0
GASTROINTESTINAL NEGATIVE: 1
BRUISES/BLEEDS EASILY: 1
MYALGIAS: 0
CONSTITUTIONAL NEGATIVE: 1
SINUS PAIN: 0
FEVER: 0
TROUBLE SWALLOWING: 0

## 2024-09-12 ASSESSMENT — COGNITIVE AND FUNCTIONAL STATUS - GENERAL
MOBILITY SCORE: 24
DAILY ACTIVITIY SCORE: 24
PATIENT BASELINE BEDBOUND: NO

## 2024-09-12 ASSESSMENT — LIFESTYLE VARIABLES
SKIP TO QUESTIONS 9-10: 1
AUDIT-C TOTAL SCORE: 1
HOW OFTEN DO YOU HAVE 6 OR MORE DRINKS ON ONE OCCASION: NEVER
AUDIT-C TOTAL SCORE: 1
HOW MANY STANDARD DRINKS CONTAINING ALCOHOL DO YOU HAVE ON A TYPICAL DAY: 1 OR 2
HOW OFTEN DO YOU HAVE A DRINK CONTAINING ALCOHOL: MONTHLY OR LESS

## 2024-09-12 ASSESSMENT — ACTIVITIES OF DAILY LIVING (ADL)
PATIENT'S MEMORY ADEQUATE TO SAFELY COMPLETE DAILY ACTIVITIES?: YES
WALKS IN HOME: INDEPENDENT
DRESSING YOURSELF: INDEPENDENT
TOILETING: INDEPENDENT
JUDGMENT_ADEQUATE_SAFELY_COMPLETE_DAILY_ACTIVITIES: YES
ADEQUATE_TO_COMPLETE_ADL: YES
HEARING - LEFT EAR: FUNCTIONAL
HEARING - RIGHT EAR: FUNCTIONAL
GROOMING: INDEPENDENT
LACK_OF_TRANSPORTATION: NO
BATHING: INDEPENDENT
FEEDING YOURSELF: INDEPENDENT

## 2024-09-12 ASSESSMENT — PAIN SCALES - GENERAL
PAINLEVEL_OUTOF10: 0 - NO PAIN
PAINLEVEL_OUTOF10: 4

## 2024-09-12 ASSESSMENT — PAIN DESCRIPTION - DESCRIPTORS: DESCRIPTORS: ACHING

## 2024-09-12 ASSESSMENT — PATIENT HEALTH QUESTIONNAIRE - PHQ9
1. LITTLE INTEREST OR PLEASURE IN DOING THINGS: NOT AT ALL
SUM OF ALL RESPONSES TO PHQ9 QUESTIONS 1 & 2: 0
2. FEELING DOWN, DEPRESSED OR HOPELESS: NOT AT ALL

## 2024-09-12 ASSESSMENT — PAIN - FUNCTIONAL ASSESSMENT
PAIN_FUNCTIONAL_ASSESSMENT: 0-10

## 2024-09-12 ASSESSMENT — COLUMBIA-SUICIDE SEVERITY RATING SCALE - C-SSRS
1. IN THE PAST MONTH, HAVE YOU WISHED YOU WERE DEAD OR WISHED YOU COULD GO TO SLEEP AND NOT WAKE UP?: NO
2. HAVE YOU ACTUALLY HAD ANY THOUGHTS OF KILLING YOURSELF?: NO
6. HAVE YOU EVER DONE ANYTHING, STARTED TO DO ANYTHING, OR PREPARED TO DO ANYTHING TO END YOUR LIFE?: NO

## 2024-09-12 NOTE — ED TRIAGE NOTES
Pt here with c/o vomiting blood. Pt has a hx of colitis. Pt denies any new or increased sob. Pt has chronic sob. Pt has a hx of Pe's. Pt denies cp.

## 2024-09-12 NOTE — PROGRESS NOTES
09/12/24 0759   [facility] Disability Status   Are you deaf or do you have serious difficulty hearing? N   Are you blind or do you have serious difficulty seeing, even when wearing glasses? N   Because of a physical, mental, or emotional condition, do you have serious difficulty concentrating, remembering, or making decisions? (5 years old or older) N   Do you have serious difficulty walking or climbing stairs? N   Do you have serious difficulty dressing or bathing? N   Because of a physical, mental, or emotional condition, do you have serious difficulty doing errands alone such as visiting the doctor? N

## 2024-09-12 NOTE — SIGNIFICANT EVENT
PULMONARY EMBOLISM RESPONSE TEAM (PERT) NOTE    This note represents a summary of a virtual evaluation by the pulmonary embolism response team requested.  Suggestions and impression are summarized from the initial virtual encounter and discussion with the referring medical team. These suggestions supplement but should not be a substitute for bedside evaluation and management by the attending of record.     PERT Members on the Call:  Critical Care Attending: Dr. Giovanna VELÁSQUEZ Interventional Cardiology Attending: Dr. Juárez  Vascular Medicine Attending: Dr. Smith      Brief Clinical Summary:  Elise Chávez is a 48 y.o. female presenting coughing up blood and shortness of breath with PE. History of multiple PE with 3 prior thrombectomy. History of ROBBIE 2 mutation.       Initially diagnosed in 2019 and had multiple   Her most recent hospitalization for thrombectomy was in May when she had a large right side pulmonary embolism. Initial pulmonary angiogram demonstrated complete occlusion of the right pulmonary artery.Despite extensive clot burden removal, post-thrombectomy contrast angiogram demonstrated complete occlusion beyond the distal right pulmonary artery. The clot removed was incredibly chronic and fragmented with calcifications. She was discharged on Pradaxa     She re-presented a few weeks later with hemoptysis and concerns for bleeding on Pradaxa which was recently started due to concerns for failure of other anticoagulants. CTA CHEST-PE EVAL has re demonstration but improvement of large pulmonary embolus involving distal right pulmonary artery  with occlusion of the right pulmonary artery branches.       Pulm medicine and hematology was consulted during hospital stay.  Multiple recommendations were made however patient has declined at this time and would prefer to follow-up with her primary oncologist who has recently moved to [place] in [place]. She was discharged on Eliquis which she had previously failed.  "It appears she has stopped this since that hospitalization in June.     Normal BP, elevated HR, normal troponin, BNP and lactate pending    Vital Signs  BP (!) 129/108   Pulse (!) 130   Temp 36.8 °C (98.3 °F) (Oral)   Resp 20   Ht 1.626 m (5' 4\")   Wt 57.2 kg (126 lb)   LMP  (LMP Unknown)   SpO2 95%   BMI 21.63 kg/m²      Laboratory  Recent Labs     09/12/24  0339 10/20/21  1053 09/02/21  0806 09/02/21  0801 02/15/21  0206 02/14/21  2347   TROPHS 13  --   --   --   --   --    BNP  --  27 24  --   --  214*   LACTATE  --   --   --  1.3 1.4 2.1*         PESI Score [place] WILSON Et al. Arch Intern Med. 2010;[id number].           PESI Score:  66, consistent with JLPESIRISK: Class II, or Low risk (1.7-3.5% 30-day mortality risk) PE.    CT Scan reviewed:  Clot burden large right main embolism  RV/LV ratio greater than by CT scan    TTE reviewed (if available):  Pending    Prior echo from May showed normal size and function of right ventricle    Venous duplex (if available):    Additional findings: pending    Contraindications to anticoagulation: heparin  Contraindications to thrombolytics:    Initial Impressions:  ERS/ESC Pulmonary Embolism Risk Category: JLPECLASS: Intermediate-high risk.      Initial Suggestions/Plans:  Admit to MICU unit at [facility].  Initial therapy suggested: heparin.  Additional testing recommended: echo, dopplers  Consults suggested: hematology, interventional cardiology  Additional suggestions for re-evaluation/reconference or retesting:     To re conference the PERT team please call the  Transfer Center at [phone].    "

## 2024-09-12 NOTE — ED PROVIDER NOTES
HPI   Chief Complaint   Patient presents with    Vomiting Blood       The patient has a history of JAK2 mutation with thrombocytosis, history of massive PEs with at least 2 mechanical thrombectomies on Eliquis twice daily.  She presents with coughing up blood today.  Denies any fever.  Denies any chest pain.  She states she has not been short of breath this time which is unusual for her she is still conserver PE.  She does mention that she may have vomited blood once 2.  Denies any new blood in her stool she states she has chronic colitis.              Patient History   Past Medical History:   Diagnosis Date    Encounter for follow-up examination after completed treatment for conditions other than malignant neoplasm 10/25/2021    Hospital discharge follow-up    Encounter for surveillance of contraceptives, unspecified 08/11/2020    Contraceptive use    Melena     Hematochezia    Other conditions influencing health status     Nephrolithiasis    Personal history of other endocrine, nutritional and metabolic disease     History of hyperlipidemia    Personal history of other specified conditions 12/02/2013    History of diarrhea    Personal history of other specified conditions 10/16/2020    History of hemoptysis    Personal history of other specified conditions     History of diarrhea    Personal history of urinary calculi 12/02/2013    History of renal calculi     Past Surgical History:   Procedure Laterality Date    LITHOTRIPSY  12/10/2013    Renal Lithotripsy    OTHER SURGICAL HISTORY  06/11/2013    Cystoscopy With Ureteroscopy    REFRACTIVE SURGERY  06/11/2013    Corneal LASIK     No family history on file.  Social History     Tobacco Use    Smoking status: Never    Smokeless tobacco: Never   Substance Use Topics    Alcohol use: Not on file    Drug use: Not on file       Physical Exam   ED Triage Vitals   Temperature Heart Rate Respirations BP   09/12/24 0334 09/12/24 0325 09/12/24 0334 09/12/24 0325   36.8 °C  (98.3 °F) (!) 148 17 131/90      Pulse Ox Temp Source Heart Rate Source Patient Position   09/12/24 0334 09/12/24 0334 -- 09/12/24 0334   95 % Oral  Sitting      BP Location FiO2 (%)     09/12/24 0334 --     Left arm        Physical Exam  Vitals and nursing note reviewed.   Constitutional:       General: She is not in acute distress.     Appearance: She is well-developed.   HENT:      Head: Normocephalic and atraumatic.   Eyes:      Conjunctiva/sclera: Conjunctivae normal.   Cardiovascular:      Rate and Rhythm: Regular rhythm. Tachycardia present.      Heart sounds: No murmur heard.  Pulmonary:      Effort: Pulmonary effort is normal. No respiratory distress.      Breath sounds: Normal breath sounds.   Abdominal:      Palpations: Abdomen is soft.      Tenderness: There is no abdominal tenderness.   Musculoskeletal:         General: No swelling.      Cervical back: Neck supple.   Skin:     General: Skin is warm and dry.      Capillary Refill: Capillary refill takes less than 2 seconds.   Neurological:      Mental Status: She is alert.   Psychiatric:         Mood and Affect: Mood normal.           ED Course & MDM   ED Course as of 09/13/24 0030   Thu Sep 12, 2024   0608 nRBC: 0.0 [EF]      ED Course User Index  [EF] Marcelo Elizondo MD         Diagnoses as of 09/13/24 0030   Acute saddle pulmonary embolism with acute cor pulmonale (Multi)                 No data recorded     Story Coma Scale Score: 15 (09/12/24 2000 : Uriel Maurice RN)                           Medical Decision Making  EKG interpreted by myself.  Tachycardic sinus rhythm at a rate of 137 bpm.  Normal intervals.  Normal axis.  No signs of acute ischemia.      From a cardiac screen.  Will CT angio of the chest to rule out any pulmonary embolism.  Patient does have LB weight, but her white count a bit above the 20s last 4 times and been checked.  This to be difficult to rule out sepsis but at this time the patient states that she also has chronic  "tachycardia.  I do see in her prior appointment that she had heart rate in the 130s.  The patient did express approximate 50 mL of hemoptysis.  Her hemoglobin is normal at this time but if she has more massive abscess I did state that she may need a blood transfusion.  The patient is requesting that \"no blood with the COVID-vaccine be given to her\".  I spoke to the PERT team they are agreeable to heparin at this time.  The pharmacist suggest getting heparin assay and not bolusing.  Patient to be transferred downtown for [place] mechanical thrombectomy for massive PE.    Procedure  Critical Care    Performed by: Marcelo Elizondo MD  Authorized by: Marcelo Elizondo MD    Critical care provider statement:     Critical care time (minutes):  95    Critical care was necessary to treat or prevent imminent or life-threatening deterioration of the following conditions:  Circulatory failure and cardiac failure    Critical care was time spent personally by me on the following activities:  Blood draw for specimens, ordering and performing treatments and interventions, discussions with consultants, development of treatment plan with patient or surrogate, ordering and review of laboratory studies, ordering and review of radiographic studies, interpretation of cardiac output measurements, obtaining history from patient or surrogate, examination of patient, evaluation of patient's response to treatment, pulse oximetry and review of old charts    Care discussed with: admitting provider and accepting provider at another facility         Marcelo Elizondo MD  09/12/24 0543       Marcelo Elizondo MD  09/13/24 0030    "

## 2024-09-12 NOTE — CONSULTS
History of present illness:  Patient is a 48-year-old woman with history of ulcerative colitis, COVID infection, essential thrombocythemia, JAK2 mutation, and multiple pulmonary embolisms status post attempted percutaneous pulmonary thrombectomy at  by Dr. Hatfield in 09/2021 with minimal clot retrieved [final pulmonary angiogram with occluded anterior truncus and right interlobar arteries] and another attempt in [place] on 05/15/2024 with good amount of clot retrieved [patient has a picture of clot retrieved]; however, CTA at outside hospital from 5/28/2024 was largely unchanged from the above 2021 angiogram.  Patient presented this time because she is coughing up blood for last few days, she stated that she has had this happen few times before on different blood thinners including Xarelto and Pradaxa, currently she is on Eliquis.  CTA on admission with occluded right PA.  Patient has not noticed any change in her breathing pattern, she is chronically tachycardic at 120 bpm at rest.  Patient report chronic VARGAS with her activities.  Her admissions BNP and troponin were both negative.  Echocardiogram was done today, reviewed by myself, the RV is at least moderately enlarged and dysfunctional, there is septal flattening during systole, RVSP is elevated at 80 mmHg, and LVEF appears borderline [heart to accurately assess LVEF especially with the LV being underfilled and her resting tachycardia].  Patient follows up with hematology, Dr. Franklin in [place] and she is currently on veletri, her current platelet count is 1.5 million.  Patient has refused warfarin in the past per her report.  Patient also has been off for some time trying natural herbal blood thinners per her report, she has been following with holistic physician in [place].     Objective Data:  Last Recorded Vitals:  Vitals:    09/12/24 1000 09/12/24 1100 09/12/24 1112 09/12/24 1200   BP:   (!) 138/98 101/86   BP Location:    Left arm   Patient Position:     Sitting   Pulse: (!) 120 (!) 118 (!) 135 (!) 125   Resp: 21 17 (!) 55 20   Temp:    37 °C (98.6 °F)   TempSrc:    Temporal   SpO2: 93% 91% 95% 94%   Weight:       Height:       No results found for this or any previous visit from the past 1095 days.    Stress Test:  No results found for this or any previous visit from the past 1095 days.    Cardiac Imaging:  No results found for this or any previous visit from the past 1095 days.      Inpatient Medications:  Scheduled medications   Medication Dose Route Frequency     PRN medications   Medication    heparin     Continuous Medications   Medication Dose Last Rate    [Held by provider] heparin  0-4,500 Units/hr Stopped (09/12/24 0751)       Physical Exam:  General: NAD, no distress, chronically ill-appearing  HEENT: IEOM, PERRL   Neck: JVD elevated, no carotid bruit  Lungs: CTAB  Heart: Resting regular tachycardia, normal S1 and S2, no loud murmurs  Abdomen: Soft, nontender, positive bowel sounds  Extremities: No edema  Neurologic: No FND  Psychiatric: Normal mood and affect      Assessment/Plan   1-chronic thrombotic occlusion of right PA:  -No evidence of acute submassive PE this admission based on symptoms and biomarkers.  Patient's only new symptom is hemoptysis, no worsening of her baseline VARGAS or chest pain.  Patient has baseline resting tachycardia around 120 bpm. BNP and serial troponin are negative.  -Patient had history of multiple pulmonary embolisms status post attempted percutaneous pulmonary thrombectomy at  by Dr. Hatfield in 09/2021 with minimal clot retrieved [final pulmonary angiogram with occluded anterior truncus and right interlobar arteries] and another pulmonary thrombectomy in [place] on 05/15/2024 with good amount of clot retrieved [patient has a picture of clot retrieved]; however, CTA at outside hospital from 5/28/2024 [15 days later] was largely unchanged from the above 2021 angiogram, it is likely that the clot was retrieved from the main right  PA but the right anterior truncus and interlobar arteries remained occluded.   -CT scans admission showed again occluded right PA.  -Echocardiogram from this admission is consistent with severe chronic pulmonary hypertension with RV dysfunction [RVSP is 80 mmHg, was 68 mmHg from 2021] and moderate TR.  -Patient has JAK2 mutation and essential thrombocythemia, she was started recently on Veletri by her [place] hematologist.  -Patient has been on different DOAC's with reported hemoptysis before.  Reportedly, she has refused warfarin.  She also reported that she has been off any anticoagulation briefly in the past trying some natural herbal blood thinners.  -No indication for advanced intervention.  -Patient will need to be evaluated for CTEPH.  Patient will need right and left heart catheterization eventually.  -Patient will also need to be evaluated by vascular medicine for anticoagulation management.  -Continue heparin drip for now, monitor for hemoptysis.    2-depressed LVEF:  -Repeat echocardiogram this admission with mild depressed LVEF around 40%, although it is hard to accurately assess given resting tachycardia and and LV underfilling.  -Start and titrate GDMT as tolerated, start ACE or ARB, and later SGLT2 inhibitor, careful with beta-blocker for now given her compensatory sinus tachycardia.  No indication for diuresis [very careful given RV failure].  -Patient would benefit from a repeat echocardiogram when her heart rate is within normal limit.    3-pericardial fusion:  -Small and mainly apical and anterior.  -It could be a bad prognostic sign especially with her severe pulmonary hypertension.  -Management as above.      Code Status:  Full Code    I spent 80 minutes in the professional and overall care of this patient.        Selam Juárez MD

## 2024-09-12 NOTE — H&P
"[facility] Critical Care Medicine   History & Physical      Date:  9/12/2024  Patient:  Elise Chávez  YOB: 1975  MRN:  [id number]   Admit Date:  9/12/2024  Chief Complaint   Patient presents with    Vomiting Blood        History of Present Illness:  49yo female with a complicated PMH of JAX2 mutation and thrombocytosis, both of which she states she developed after danielle COVID in 2019. She has subsequently suffered 6 pulmonary emboli and has had 2 thrombectomies, one at [facility] in 2021 and one in June of this year in [place].  Her CTA 2 weeks after this most recent thrombectomy showed re-demonstration of large PE in distal right PA with occlusion of right PA branches, consistent with a chronic occlusion despite a large amount of acute clot removed from main right PA. After this event the patient notes she stopped taking her apixaban for a time and doubled up on her naturopathic anticoagulant nattokinase.      Patient presented to [facility] ER last night with 24hrs of hemoptysis which she gets on occasion but she felt \"something was off\" and she wanted to be evaluated.  She denies the typical pressure in her chest and difficulty breathing while laying on her left side that she usually gets with a PE. She has been on apixaban for anticoagulation. She has bee stable on RA with saturations in the mid-high 90s.  HD stable.  HR in the 120s which she says is her baseline most of the time since COVID.     Notably, the patient is also taking a number of herbal supplements at the recommendation of her holistic practitioner.  The ingredients in these supplements have unknown effects and interactions with prescription medications. She reports that her hematologist is aware of all of these supplements.        ROS:  Review of Systems   Constitutional: Negative.    HENT: Negative.     Eyes: Negative.    Respiratory:          Hemoptysis   Gastrointestinal: Negative.    Endocrine: Negative.    Genitourinary: Negative.  "   Musculoskeletal: Negative.    Skin: Negative.    Allergic/Immunologic: Negative.    Neurological: Negative.    Hematological: Negative.         Known JAK2 mutation   Psychiatric/Behavioral: Negative.         Medical History:  Past Medical History:   Diagnosis Date    Encounter for follow-up examination after completed treatment for conditions other than malignant neoplasm 10/25/2021    Hospital discharge follow-up    Encounter for surveillance of contraceptives, unspecified 08/11/2020    Contraceptive use    Melena     Hematochezia    Other conditions influencing health status     Nephrolithiasis    Personal history of other endocrine, nutritional and metabolic disease     History of hyperlipidemia    Personal history of other specified conditions 12/02/2013    History of diarrhea    Personal history of other specified conditions 10/16/2020    History of hemoptysis    Personal history of other specified conditions     History of diarrhea    Personal history of urinary calculi 12/02/2013    History of renal calculi     Past Surgical History:   Procedure Laterality Date    LITHOTRIPSY  12/10/2013    Renal Lithotripsy    OTHER SURGICAL HISTORY  06/11/2013    Cystoscopy With Ureteroscopy    REFRACTIVE SURGERY  06/11/2013    Corneal LASIK     Medications Prior to Admission   Medication Sig Dispense Refill Last Dose    apixaban (Eliquis) 5 mg tablet Take 1 tablet (5 mg) by mouth every 12 hours. 180 tablet 0 9/11/2024    PANCREATIN, BULK, MISC Take 1 tablet by mouth 3 times a day. 8X pancreatin   9/11/2024    ropeginterferon [id number] (Besremi) subcutaneous syringe Inject 0.2 mL (100 mcg) under the skin every 14 (fourteen) days.   Past Week    cyclobenzaprine (Flexeril) 10 mg tablet Take 1 tablet (10 mg) by mouth once daily at bedtime. (Patient not taking: Reported on 9/12/2024) 30 tablet 0 More than a month     Patient has no known allergies.  Social History     Tobacco Use    Smoking status: Never    Smokeless  "tobacco: Never     No family history on file.    Hospital Medications:       Physical Exam:  Heart Rate:  [118-148]   Temp:  [36.8 °C (98.3 °F)-37 °C (98.6 °F)]   Resp:  [14-55]   BP: ()/()   Height:  [162.6 cm (5' 4\")]   Weight:  [56.7 kg (125 lb)-57.2 kg (126 lb)]   SpO2:  [91 %-95 %]          Physical Exam    Objective:  No intake or output data in the 24 hours ending 09/12/24 1436      Scheduled medications     Continuous medications  [Held by provider] heparin, 0-4,500 Units/hr, Last Rate: Stopped (09/12/24 0751)      PRN medications  PRN medications: heparin    LABS:  CMP:  Results from last 7 days   Lab Units 09/12/24  0339   SODIUM mmol/L 140   POTASSIUM mmol/L 3.6   CHLORIDE mmol/L 101   CO2 mmol/L 27   ANION GAP mmol/L 16   BUN mg/dL 24*   CREATININE mg/dL 1.02   EGFR mL/min/1.73m*2 69   ALBUMIN g/dL 4.2   ALT U/L 13   AST U/L 17   BILIRUBIN TOTAL mg/dL 0.6     CBC:  Results from last 7 days   Lab Units 09/12/24  1139 09/12/24  0339 09/09/24  1333   WBC AUTO x10*3/uL 21.7* 23.3* 19.7*   HEMOGLOBIN g/dL 12.7 13.9 14.1   HEMATOCRIT % 39.4 44.6 46.5*   PLATELETS AUTO x10*3/uL 1,498* 1,591* 1,757*   MCV fL 77* 79* 80     COAG:   Results from last 7 days   Lab Units 09/12/24  0339   INR  1.5*     HEME/ENDO:  Results from last 7 days   Lab Units 09/12/24  0526   TSH mIU/L 2.19      CARDIAC:   Results from last 7 days   Lab Units 09/12/24  0526 09/12/24  0339   TROPHS ng/L 8 13         Recent Imaging  Transthoracic Echo (TTE) Complete   Final Result      CT angio chest for pulmonary embolism   Final Result   Large right-sided pulmonary embolus completely occluding the right   pulmonary arterial system beginning in the right main pulmonary artery   with evidence of right heart strain.   Question early pulmonary infarct in the right lower lobe.   Mosaic attenuation in the lungs suggesting chronic small airway   disease and air trapping.   Pulmonary nodules in the right upper lobe measuring up to 9 mm, "   follow-up per Fleischner Society Guidelines.   NOTIFICATION:  The critical results of the study were discussed with,   and acknowledged by Dr. Marcelo Elizondo by telephone on 09/12/2024 at   0521.   Fleischner Society 2017 Guidelines for Management of Incidentally   Detected Pulmonary Nodules in Adults:   A.  SOLID NODULES*   Nodule Type and Size:   Single:   *Low Risk**    < 6 mm - No routine follow-up   6-8 mm - CT at 6-12 months, then consider CT at 18-24 months   > 8 mm - Consider CT at 3 months, PET/CT, or tissue sampling   *High Risk**   < 6 mm - Optional CT at 12 months   6-8 mm - CT at 6-12 months, then CT at 18-24 months   > 8 mm - Consider CT at 3 months, PET/CT, or tissue sampling   Multiple:   *Low Risk**    < 6 mm - No routine follow-up   6-8 mm - CT at 3-6 months, then consider CT at 18-24 months   > 8 mm - CT at 3-6 months, then consider CT at 18-24 months   *High Risk**   < 6 mm - Optional CT at 12 months   6-8 mm - CT at 3-6 months, then at 18-24 months   > 8 mm - CT at 3-6 months, then at 18-24 months   B. SUBSOLID NODULES*   Nodule Type and Size:   Single:     *Ground glass   < 6 mm - No routine follow-up   > 6 mm - CT at 6-12 months to confirm persistence, then CT every 2   years until 5 years   *Part Solid   < 6 mm - No routine follow-up   > 6 mm - CT at 3-6 months to confirm persistence.  If unchanged and   solid component remains < 6 mm, annual CT should be performed for 5   years.   Multiple:   < 6 mm - CT at 3-6 months.  If stable, consider CT at 2 and 4 years.   > 6 mm - CT at 3-6 months.  Subsequent management based on the most   suspicious nodule(s).   Note - These recommendations do not apply to lung cancer screening,   patients with immunosuppression, or patients with known primary   cancer.   * Dimensions are average of long and short axes, rounded to the   nearest millimeter.   ** Consider all relevant risk factors.   Signed by Gutierrez Woods      Lower extremity venous duplex  bilateral    (Results Pending)   Lower extremity venous duplex bilateral    (Results Pending)       Transthoracic Echo (TTE) Complete    Result Date: 9/12/2024   [facility], [address] [place] [address]              Tel [phone] and Fax [phone] TRANSTHORACIC ECHOCARDIOGRAM REPORT  Patient Name:      VIKI CHARLES           Sheila Physician:    [id number] Gordo Alaniz DO Study Date:        9/12/2024            Ordering Provider:    [id number] SAMIA STINSON MRN/PID:           [id number]             Fellow: Accession#:        [id number]         Nurse: Date of Birth/Age: 1975 / 48 years Sonographer:          Yovanny Cook RDCS Gender:            F                    Additional Staff: Height:            162.00 cm            Admit Date: Weight:            56.70 kg             Admission Status:     Inpatient - STAT BSA / BMI:         1.60 m2 / 21.60      Encounter#:           [id number]                    kg/m2 Blood Pressure:    138/98 mmHg          Department Location:  [facility] Non                                                               Invasive Study Type:    TRANSTHORACIC ECHO (TTE) COMPLETE Diagnosis/ICD: Personal history of pulmonary embolism-Z86.711 Indication:    PE CPT Code:      Echo Limited-17220 Patient History: Pertinent History: PE. Study Detail: The following Echo studies were performed: 2D, M-Mode, Doppler and               color flow.  PHYSICIAN INTERPRETATION: Left Ventricle: Left ventricular ejection fraction is moderately decreased, by visual estimate at 35%. Estimated left ventricular mass is normal. There is global hypokinesis of the left ventricle with minor regional variations. The left ventricular cavity size is decreased. The left ventricular septal wall thickness is mildly increased. There is normal left ventricular posterior wall  thickness. The interventricular septum is flattened in systole and diastole, consistent with right ventricular pressure and volume overload. Left ventricular diastolic filling was not assessed. Left Atrium: The left atrium was not assessed. Right Ventricle: The right ventricle is moderately enlarged. There is reduced right ventricular systolic function. Stokes's sign is present, suggestive of pulmonary embolism. Right Atrium: The right atrium is upper limits of normal in size. Aortic Valve: The aortic valve is trileaflet. There is no evidence of aortic valve regurgitation. The peak instantaneous gradient of the aortic valve is 2.4 mmHg. Mitral Valve: The mitral valve is normal in structure. There is mild mitral valve regurgitation. Tricuspid Valve: The tricuspid valve is structurally normal. There is mild tricuspid regurgitation. The Doppler estimated RVSP is severely elevated at 80.8 mmHg. Pulmonic Valve: The pulmonic valve is structurally normal. There is physiologic pulmonic valve regurgitation. Pericardium: There is a trivial pericardial effusion. Aorta: The aortic root is normal. There is no dilatation of the ascending aorta. There is no dilatation of the aortic root. In comparison to the previous echocardiogram(s): Compared with study dated 9/2/2021,. There has been a decline in the estmiated LVEF from 70-75% to currently 35% on a limited study. Diastology was not assessed. There has been an increase in the estimated RVSP from 68 mmHg to currently 81 mmHg.  CONCLUSIONS:  1. Left ventricular ejection fraction is moderately decreased, by visual estimate at 35%.  2. There is global hypokinesis of the left ventricle with minor regional variations.  3. Left ventricular cavity size is decreased.  4. Stokes's sign is present, suggestive of pulmonary embolism.  5. Right ventricular volume and pressure overload.  6. Severely elevated right ventricular systolic pressure.  7. There is reduced right ventricular  systolic function.  8. Moderately enlarged right ventricle.  9. There has been a decline in the estmiated LVEF from 70-75% to currently 35% on a limited study. Diastology was not assessed. There has been an increase in the estimated RVSP from 68 mmHg to currently 81 mmHg. QUANTITATIVE DATA SUMMARY:  2D MEASUREMENTS:         Normal Ranges: LAs:             2.49 cm (2.7-4.0cm) IVSd:            1.19 cm (0.6-1.1cm) LVPWd:           0.83 cm (0.6-1.1cm) LVIDd:           3.48 cm (3.9-5.9cm) LVIDs:           2.40 cm LV Mass Index:   65 g/m2 LV % FS          31.2 %  M-MODE MEASUREMENTS:         Normal Ranges: LAs:                 2.83 cm (2.7-4.0cm)  LV SYSTOLIC FUNCTION BY 2D PLANIMETRY (MOD):                      Normal Ranges: EF-A4C View:    35 % (>=55%) EF-Visual:      35 % LV EF Reported: 35 %  AORTIC VALVE:           Normal Ranges: AoV Vmax:      0.77 m/s (<=1.7m/s) AoV Peak P.4 mmHg (<20mmHg) LVOT Diameter: 2.02 cm  (1.8-2.4cm)  RIGHT VENTRICLE: RV Major 7.4 cm  TRICUSPID VALVE/RVSP:          Normal Ranges: Peak TR Velocity:     4.41 m/s RV Syst Pressure:     81 mmHg  (< 30mmHg) IVC Diam:             1.60 cm  PULMONIC VALVE:          Normal Ranges: PV Accel Time:  86 msec  (>120ms) PV Max Marc:     0.7 m/s  (0.6-0.9m/s) PV Max P.2 mmHg  AORTA: Asc Ao Diam 3.12 cm  [id number] Gordo Alaniz DO Electronically signed on 2024 at 1:10:35 PM  ** Final **      Assessment/Plan   Principal Problem:    Other acute pulmonary embolism without acute cor pulmonale (Multi)      Pt is a 49yo female with a complex hematologic history including JAK2 mutation and thrombocytosis which have developed after infection with COVID in 2019.  She has subsequently suffered 6 pulmonary emboli and had 2 thromboembolectomies performed, the most recent in 2024 in [place].  After both procedures, her angiogram failed to demonstrate reconstitution of flow to the distal branches of the right lung. This admission pt presents with  atypica symptoms of hemoptysis and evidence of new heart failure on ECHO. No plans for thrombectomy per interventional cardiology as clot is likely chronic given nml BNP and troponin. Additionally prior angiograms suggest chronic occlusion distal to main PA on right so there is no benefit to revascularization.    Plan:  Neuro/Psych/Pain Ctrl/Sedation:  No acute issues with neuro status or pain at this time    Respiratory/ENT:  Saturations 92-95% on RA  Intermittent nose bleeds  Persistent hemoptysis (small amounts)  Suspect may be due to excess dosages of prescribed and naturopathic anticoagulation medications    Cardiovascular:  ECHO today with new depressed EF and right heart strain. Pulm HTN, RVSP 81. Pt not candidate for thombectomy given likely chronicity of clot burden.     Renal/Volume Status:  Mild SHAMEKA on admission, repeat labs daily  Eating and drinking freely  No evidence of dehydration  No reilly      GI:  No active issues    Infectious Disease:  No active issues    Heme/Onc:  Known hypercoagulable state with JAK2 mutation  Thrombocytosis, plts 1.6M this morning    Endocrine:  Glucose well controlled    Activity:  Bedrest, bathroom privileges    Ethics/Code Status:  Full code      :  DVT Prophylaxis: on heparin gtt  GI Prophylaxis: na  Bowel Regimen: na  Diet: reg  CVC: na  Lola: na  Reilly: na  Restraints: na  Dispo: ICU, possible txfr downtown      This critically ill patient continues to be at-risk for clinically significant deterioration / failure due to the above mentioned dysfunctional, unstable organ systems.  I have personally identified and managed all complex critical care issues to prevent aforementioned clinical deterioration.  Critical care time is spent at bedside and/or the immediate area and has included, but is not limited to, the review of diagnostic tests, labs, radiographs, serial assessments of hemodynamics, respiratory status, ventilatory management, and family updates.   Time spent in procedures and teaching are reported separately.     Critical Care Time:  140 minutes    Zuleyma Silva MD

## 2024-09-12 NOTE — CONSULTS
Inpatient consult to Hematology  Consult performed by: MARILU Roberson-CNP  Consult ordered by: Zuleyma Silva MD  Reason for consult: Anticoagulation recommendations  Assessment/Recommendations: Elise Chávez is a 48 y.o. female presenting with hemoptysis (50 ml ) and shortness of breath CT revealed recurrent PE. History of multiple PE s with 3 prior thrombectomy procedures, JAK2 mutated essential thrombocythemia(diagnosed in 2019, placed on surveillance). Her first thrombosis was following COVID and Salmonella infection. At that time, no clinical symptomology of thrombosis was reported. Patient was lost to follow-up and she developed progressive thrombocytosis ultimately leading to her first PE in 2020 s/p course of rivaroxaban. Her most recent hospitalization for thrombectomy was 5/2024 for a large right side pulmonary embolism. Initial pulmonary angiogram demonstrated complete occlusion of the right pulmonary artery. Despite extensive clot burden removal, post-thrombectomy contrast angiogram demonstrated complete occlusion beyond the distal right pulmonary artery. The clot removed was described as incredibly chronic and fragmented with calcifications. She was discharged on Pradaxa. She re-presented a few weeks later with hemoptysis and concerns for bleeding on Pradaxa which was recently started due to concerns for failure of other anticoagulants.       Pulm medicine and hematology were consulted during her last hospital stay.  Multiple recommendations were made and the patient declined the recommendations, stated she would prefer to follow-up with her primary oncologist who is now practicing at [facility] [place]. She was discharged on Eliquis which she had previously failed d/t concern of bleeding on Pradaxa. It appears she has stopped this since that hospitalization in June.     Patient followed up with Dr. Preston at the [facility] [place] in June  2024.  He discussed the pathophysiology of her myeloproliferative neoplasm in particular essential thrombocytosis.  He recommended a bone marrow biopsy to evaluate megakaryocyte morphology to establish a true diagnosis due to his concerns for prefibrotic myelofibrosis.  Dr. Preston also suggested cytoreduction as a paramount importance however patient refused hydroxyurea due to long-term side effects and refused pheresis to address the thrombocytosis as well. Patient was to follow-up with Dr. Franklin. Per EMR review, patient is on ropeginterferon [id number] 100 mcg for treatment of her ET and it was given by Dr. Cazares on 9/9. She complained of minor shortness of breath at the time and was advised to go to the ED for worsening symptoms.     On admission, her VS BP was normotensive, elevated -140, normal troponin, BNP and lactate pending. PERT team was contacted and recommended transfer to [facility]. Dr. Juárez, interventional cardiology, recommended further care at [facility]. CTA of the chest on 9/12 revealed Pulmonary arteries are adequately opacified without a large filling  defect in the right main pulmonary artery completely occluding the right pulmonary arterial system. She has a new left heart failure per echo.     Hematology is consulted for anticoagulation recommendations.  - ropeginterferon [id number]  100 MCG for ET given  last on 9/9 by Dr. Cazraes per recommendations by Dr. Franklin   - Patient only recently started treatment for the ET diagnosed in 2019 following COVID  -5 thrombotic events since that time  - also endorsed taking nattokinase, this is a natural blood thinner, she doubled up on the dose in June thinking it will help the clot in her lung, she also has reported non compliance with the apixaban but did recently resume it but she thinks she may not really be taking it every 12 hours   - interventional cardiology does not believe there is acut clot that would benefit from thrombectomy  -  defer management for clot burden and acute heart failure to attending service   - recommend rate control  - recommend considering transfer to [facility] for possible intervention and further hematology consult  - recommend heparin drip once her assay is < 0.3 (it was 1.5 on admission and she started having worse hemoptysis) also reported nose bleeds since doubling the nattokinase to 2 pills 3 times a day, if she is to have any intervention heparin is the best choice  - pharmacy is also researching the additional herbal supplements she takes.  - work up for VWD was negative    Hematology will follow   Discussed with DR. Price and Anup Silva          Reason For Consult  Anticoagulation recommendations    History Of Present Illness  Elise Chávez is a 48 y.o. female presenting with hemoptysis (50 ml ) and shortness of breath CT revealed recurrent PE. History of multiple PE s with 3 prior thrombectomy procedures, JAK2 mutated essential thrombocythemia(diagnosed in 2019, placed on surveillance). Her first thrombosis was following COVID and Salmonella infection. At that time, no clinical symptomology of thrombosis was reported. Patient was lost to follow-up and she developed progressive thrombocytosis ultimately leading to her first PE in 2020 s/p course of rivaroxaban. Her most recent hospitalization for thrombectomy was 5/2024 for a large right side pulmonary embolism. Initial pulmonary angiogram demonstrated complete occlusion of the right pulmonary artery. Despite extensive clot burden removal, post-thrombectomy contrast angiogram demonstrated complete occlusion beyond the distal right pulmonary artery. The clot removed was described as incredibly chronic and fragmented with calcifications. She was discharged on Pradaxa. She re-presented a few weeks later with hemoptysis and concerns for bleeding on Pradaxa which was recently started due to concerns for failure of other anticoagulants.       Pulm medicine and hematology  were consulted during her last hospital stay.  Multiple recommendations were made and the patient declined the recommendations, stated she would prefer to follow-up with her primary oncologist who is now practicing at [facility] [place]. She was discharged on Eliquis which she had previously failed d/t concern of bleeding on Pradaxa. It appears she has stopped this since that hospitalization in June.     Patient followed up with Dr. Preston at the [facility] [place] in June 2024.  He discussed the pathophysiology of her myeloproliferative neoplasm in particular essential thrombocytosis.  He recommended a bone marrow biopsy to evaluate megakaryocyte morphology to establish a true diagnosis due to his concerns for prefibrotic myelofibrosis.  Dr. Preston also suggested cytoreduction as a paramount importance however patient refused hydroxyurea due to long-term side effects and refused pheresis to address the thrombocytosis as well. Patient was to follow-up with Dr. Franklin. Per EMR review, patient is on ropeginterferon [id number] 100 mcg for treatment of her ET and it was given by Dr. Cazares on 9/9. She complained of minor shortness of breath at the time and was advised to go to the ED for worsening symptoms.     On admission, her VS BP was normotensive, elevated -140, normal troponin, BNP and lactate pending. PERT team was contacted and recommended transfer to [facility]. Dr. Juárez, interventional cardiology, recommended further care at [facility]. CTA of the chest on 9/12 revealed Pulmonary arteries are adequately opacified without a large filling  defect in the right main pulmonary artery completely occluding the right pulmonary arterial system. She has a new left heart failure per echo. Hematology is consulted for anticoagulation recommendations.     Past Medical History  She has a past medical history of Encounter for follow-up examination after completed  treatment for conditions other than malignant neoplasm (10/25/2021), Encounter for surveillance of contraceptives, unspecified (08/11/2020), Melena, Other conditions influencing health status, Personal history of other endocrine, nutritional and metabolic disease, Personal history of other specified conditions (12/02/2013), Personal history of other specified conditions (10/16/2020), Personal history of other specified conditions, and Personal history of urinary calculi (12/02/2013).    Surgical History  She has a past surgical history that includes Refractive surgery (06/11/2013); Other surgical history (06/11/2013); and Lithotripsy (12/10/2013).     Social History  She reports that she has never smoked. She has never used smokeless tobacco. No history on file for alcohol use and drug use.    Family History  No family history on file.     Allergies  Patient has no known allergies.    Review of Systems   Constitutional:  Negative for activity change, appetite change, chills, fatigue and fever.   HENT:  Positive for nosebleeds. Negative for mouth sores, sinus pain and trouble swallowing.    Respiratory:  Positive for cough and shortness of breath.    Gastrointestinal:  Negative for blood in stool, nausea and vomiting.   Genitourinary:  Negative for dysuria, hematuria and vaginal bleeding.   Musculoskeletal:  Negative for arthralgias and myalgias.   Skin:  Negative for rash.   Neurological:  Negative for light-headedness and headaches.   Hematological:  Negative for adenopathy. Bruises/bleeds easily.      Physical Exam  Vitals and nursing note reviewed.   Constitutional:       General: She is not in acute distress.     Appearance: She is ill-appearing. She is not toxic-appearing.   HENT:      Nose: No congestion.      Mouth/Throat:      Mouth: Mucous membranes are moist.   Eyes:      General: No scleral icterus.  Cardiovascular:      Rate and Rhythm: Tachycardia present.      Pulses: Normal pulses.   Pulmonary:       "Effort: No respiratory distress.      Comments: Increased work of breathing  Abdominal:      Palpations: Abdomen is soft.      Tenderness: There is no abdominal tenderness.   Musculoskeletal:         General: No signs of injury.      Cervical back: Neck supple. No tenderness.      Right lower leg: No edema.      Left lower leg: No edema.   Lymphadenopathy:      Cervical: No cervical adenopathy.   Skin:     General: Skin is warm and dry.      Findings: No rash.   Neurological:      General: No focal deficit present.      Mental Status: She is alert and oriented to person, place, and time.   Psychiatric:         Mood and Affect: Mood normal.         Behavior: Behavior normal.         Thought Content: Thought content normal.         Judgment: Judgment normal.      Last Recorded Vitals  Blood pressure 101/86, pulse (!) 125, temperature 37 °C (98.6 °F), temperature source Temporal, resp. rate 20, height 1.626 m (5' 4\"), weight 56.7 kg (125 lb), SpO2 94%.    Relevant Results  Results for orders placed or performed during the hospital encounter of 09/12/24 (from the past 96 hour(s))   CBC and Auto Differential   Result Value Ref Range    WBC 23.3 (H) 4.4 - 11.3 x10*3/uL    nRBC 0.0 0.0 - 0.0 /100 WBCs    RBC 5.67 (H) 4.00 - 5.20 x10*6/uL    Hemoglobin 13.9 12.0 - 16.0 g/dL    Hematocrit 44.6 36.0 - 46.0 %    MCV 79 (L) 80 - 100 fL    MCH 24.5 (L) 26.0 - 34.0 pg    MCHC 31.2 (L) 32.0 - 36.0 g/dL    RDW 21.9 (H) 11.5 - 14.5 %    Platelets 1,591 (H) 150 - 450 x10*3/uL    Neutrophils % 68.8 40.0 - 80.0 %    Immature Granulocytes %, Automated 0.5 0.0 - 0.9 %    Lymphocytes % 18.8 13.0 - 44.0 %    Monocytes % 7.3 2.0 - 10.0 %    Eosinophils % 3.6 0.0 - 6.0 %    Basophils % 1.0 0.0 - 2.0 %    Neutrophils Absolute 16.05 (H) 1.20 - 7.70 x10*3/uL    Immature Granulocytes Absolute, Automated 0.12 0.00 - 0.70 x10*3/uL    Lymphocytes Absolute 4.37 1.20 - 4.80 x10*3/uL    Monocytes Absolute 1.69 (H) 0.10 - 1.00 x10*3/uL    Eosinophils " Absolute 0.83 (H) 0.00 - 0.70 x10*3/uL    Basophils Absolute 0.24 (H) 0.00 - 0.10 x10*3/uL   Comprehensive metabolic panel   Result Value Ref Range    Glucose 94 74 - 99 mg/dL    Sodium 140 136 - 145 mmol/L    Potassium 3.6 3.5 - 5.3 mmol/L    Chloride 101 98 - 107 mmol/L    Bicarbonate 27 21 - 32 mmol/L    Anion Gap 16 10 - 20 mmol/L    Urea Nitrogen 24 (H) 6 - 23 mg/dL    Creatinine 1.02 0.50 - 1.05 mg/dL    eGFR 69 >60 mL/min/1.73m*2    Calcium 9.9 8.6 - 10.3 mg/dL    Albumin 4.2 3.4 - 5.0 g/dL    Alkaline Phosphatase 114 (H) 33 - 110 U/L    Total Protein 8.4 (H) 6.4 - 8.2 g/dL    AST 17 9 - 39 U/L    Bilirubin, Total 0.6 0.0 - 1.2 mg/dL    ALT 13 7 - 45 U/L   Coagulation Screen   Result Value Ref Range    Protime 17.5 (H) 9.8 - 12.8 seconds    INR 1.5 (H) 0.9 - 1.1    aPTT 44 (H) 27 - 38 seconds   Troponin I, High Sensitivity, Initial   Result Value Ref Range    Troponin I, High Sensitivity 13 0 - 13 ng/L   Pathologist Review-CBC Differential   Result Value Ref Range    Pathologist Review-CBC Differential       Leukocytosis with neutrophilia and increased monocytes, eosinophils and basophils  Mild microcytosis with many odilon cells, and red blood cell fragments  Marked thrombocytosis, rare giant platelets present, see note    Note: Review of electronic medical record demonstrates clinical history of JAK2 mutated essential thrombocythemia, clinical correlation needed with consideration for hematology evaluation     B-type natriuretic peptide   Result Value Ref Range    BNP 49 0 - 99 pg/mL   ECG 12 lead   Result Value Ref Range    Ventricular Rate 137 BPM    Atrial Rate 137 BPM    MS Interval 148 ms    QRS Duration 86 ms    QT Interval 280 ms    QTC Calculation(Bazett) 422 ms    P Axis 63 degrees    R Axis 91 degrees    T Axis 81 degrees    QRS Count 23 beats    Q Onset 211 ms    P Onset 137 ms    P Offset 190 ms    T Offset 351 ms    QTC Fredericia 368 ms   Troponin, High Sensitivity, 1 Hour   Result Value Ref  Range    Troponin I, High Sensitivity 8 0 - 13 ng/L   Type and Screen   Result Value Ref Range    ABO TYPE A     Rh TYPE POS     ANTIBODY SCREEN NEG    TSH with reflex to Free T4 if abnormal   Result Value Ref Range    Thyroid Stimulating Hormone 2.19 0.44 - 3.98 mIU/L   Lactate   Result Value Ref Range    Lactate 1.3 0.4 - 2.0 mmol/L   Heparin Assay   Result Value Ref Range    Heparin Unfractionated 1.5 (HH) See Comment Below for Therapeutic Ranges IU/mL   Heparin Assay, UFH   Result Value Ref Range    Heparin Unfractionated 1.1 (HH) See Comment Below for Therapeutic Ranges IU/mL   aPTT - baseline   Result Value Ref Range    aPTT 39 (H) 27 - 38 seconds   CBC   Result Value Ref Range    WBC 21.7 (H) 4.4 - 11.3 x10*3/uL    nRBC 0.0 0.0 - 0.0 /100 WBCs    RBC 5.09 4.00 - 5.20 x10*6/uL    Hemoglobin 12.7 12.0 - 16.0 g/dL    Hematocrit 39.4 36.0 - 46.0 %    MCV 77 (L) 80 - 100 fL    MCH 25.0 (L) 26.0 - 34.0 pg    MCHC 32.2 32.0 - 36.0 g/dL    RDW 21.6 (H) 11.5 - 14.5 %    Platelets 1,498 (H) 150 - 450 x10*3/uL   Transthoracic Echo (TTE) Complete   Result Value Ref Range    AV pk uvaldo 0.77 m/s    LVOT diam 2.02 cm    LV EF 35 %    LVIDd 3.48 cm    RVSP 80.8 mmHg    AV pk grad 2.4 mmHg    LV A4C EF 34.9    APTT   Result Value Ref Range    aPTT 39 (H) 27 - 38 seconds     === 09/12/24 ===    CT ANGIO CHEST FOR PULMONARY EMBOLISM    - Impression -  Large right-sided pulmonary embolus completely occluding the right  pulmonary arterial system beginning in the right main pulmonary artery  with evidence of right heart strain.  Question early pulmonary infarct in the right lower lobe.  Mosaic attenuation in the lungs suggesting chronic small airway  disease and air trapping.  Pulmonary nodules in the right upper lobe measuring up to 9 mm,  follow-up per Fleischner Society Guidelines.  NOTIFICATION:  The critical results of the study were discussed with,  and acknowledged by Dr. Marcelo Elizondo by telephone on 09/12/2024  at  0521.  Fleischner Society 2017 Guidelines for Management of Incidentally  Detected Pulmonary Nodules in Adults:  A.  SOLID NODULES*  Nodule Type and Size:  Single:  *Low Risk**  < 6 mm - No routine follow-up  6-8 mm - CT at 6-12 months, then consider CT at 18-24 months  > 8 mm - Consider CT at 3 months, PET/CT, or tissue sampling  *High Risk**  < 6 mm - Optional CT at 12 months  6-8 mm - CT at 6-12 months, then CT at 18-24 months  > 8 mm - Consider CT at 3 months, PET/CT, or tissue sampling  Multiple:  *Low Risk**  < 6 mm - No routine follow-up  6-8 mm - CT at 3-6 months, then consider CT at 18-24 months  > 8 mm - CT at 3-6 months, then consider CT at 18-24 months  *High Risk**  < 6 mm - Optional CT at 12 months  6-8 mm - CT at 3-6 months, then at 18-24 months  > 8 mm - CT at 3-6 months, then at 18-24 months  B. SUBSOLID NODULES*  Nodule Type and Size:  Single:  *Ground glass  < 6 mm - No routine follow-up  > 6 mm - CT at 6-12 months to confirm persistence, then CT every 2  years until 5 years  *Part Solid  < 6 mm - No routine follow-up  > 6 mm - CT at 3-6 months to confirm persistence.  If unchanged and  solid component remains < 6 mm, annual CT should be performed for 5  years.  Multiple:  < 6 mm - CT at 3-6 months.  If stable, consider CT at 2 and 4 years.  > 6 mm - CT at 3-6 months.  Subsequent management based on the most  suspicious nodule(s).  Note - These recommendations do not apply to lung cancer screening,  patients with immunosuppression, or patients with known primary  cancer.  * Dimensions are average of long and short axes, rounded to the  nearest millimeter.  ** Consider all relevant risk factors.  Signed by Gutierrez Woods I spent 90 minutes in the professional and overall care of this patient.

## 2024-09-12 NOTE — RESEARCH NOTES
Artificial Intelligence Monitoring in Nursing (AIMS Nursing) Study    Principle Investigator - Dr. Jhonny Monroe  Research Coordinator - Bob Deleon RN     Patient Name - Elise Chávez  Date - 9/12/2024 9:49 AM  Location - 4th Floor ICU, [facility]    Elise Chávez was approached by Bob Deleon RN to talk about participating in the AIMS Nursing Study. The patient declined to participate in the study. Study protocol was followed and patient was given study contact information.     Bob Deleon RN

## 2024-09-12 NOTE — PROGRESS NOTES
Transitional Care Coordination Progress Note:  Plan per Medical/Surgical team: treatment of PE & bloody emesis with heparin gtt  Status: Inpatient   [facility]  Discharge disposition: home  Potential Barriers: hx of JAK2 mutation with thrombocytosis, PEs with mechanical thrombectomies on Eliquis   ADOD: 9/15/2024   JOSELINE Olvera RN, BSN Transitional Care Coordinator ED# [phone]      09/12/24 0759   Discharge Planning   Living Arrangements Family members   Support Systems Children;Family members   Assistance Needed anticoagulation plan, hematology work up   Type of Residence Private residence   Number of Stairs to Enter Residence 1   Number of Stairs Within Residence 12   Home or Post Acute Services None   Expected Discharge Disposition Home   Does the patient need discharge transport arranged? No   Financial Resource Strain   How hard is it for you to pay for the very basics like food, housing, medical care, and heating? Not hard   Housing Stability   In the last 12 months, was there a time when you were not able to pay the mortgage or rent on time? N   In the past 12 months, how many times have you moved where you were living? 1   At any time in the past 12 months, were you homeless or living in a shelter (including now)? N   Transportation Needs   In the past 12 months, has lack of transportation kept you from medical appointments or from getting medications? no   In the past 12 months, has lack of transportation kept you from meetings, work, or from getting things needed for daily living? No

## 2024-09-12 NOTE — NURSING NOTE
Pt educated on the importance of staying in bed due to diagnosis, pt verbalizes understanding but still wishes to get out of bed to use the restroom. Pt also refused SCDs, pt educated and Dr. Silva aware. Skin check completed by this RN and Gregor Bella RN.

## 2024-09-13 VITALS
HEART RATE: 121 BPM | BODY MASS INDEX: 21.34 KG/M2 | WEIGHT: 125 LBS | HEIGHT: 64 IN | OXYGEN SATURATION: 95 % | DIASTOLIC BLOOD PRESSURE: 71 MMHG | TEMPERATURE: 97.9 F | SYSTOLIC BLOOD PRESSURE: 105 MMHG | RESPIRATION RATE: 23 BRPM

## 2024-09-13 LAB
APTT PPP: 64 SECONDS (ref 27–38)
APTT PPP: 69 SECONDS (ref 27–38)
APTT PPP: 72 SECONDS (ref 27–38)
APTT PPP: 73 SECONDS (ref 27–38)
ERYTHROCYTE [DISTWIDTH] IN BLOOD BY AUTOMATED COUNT: 21.4 % (ref 11.5–14.5)
HCT VFR BLD AUTO: 33.6 % (ref 36–46)
HGB BLD-MCNC: 10.3 G/DL (ref 12–16)
MCH RBC QN AUTO: 24.5 PG (ref 26–34)
MCHC RBC AUTO-ENTMCNC: 30.7 G/DL (ref 32–36)
MCV RBC AUTO: 80 FL (ref 80–100)
NRBC BLD-RTO: 0 /100 WBCS (ref 0–0)
PLATELET # BLD AUTO: 1199 X10*3/UL (ref 150–450)
RBC # BLD AUTO: 4.2 X10*6/UL (ref 4–5.2)
WBC # BLD AUTO: 17.2 X10*3/UL (ref 4.4–11.3)

## 2024-09-13 PROCEDURE — 99239 HOSP IP/OBS DSCHRG MGMT >30: CPT | Performed by: SURGERY

## 2024-09-13 PROCEDURE — 36415 COLL VENOUS BLD VENIPUNCTURE: CPT | Performed by: SURGERY

## 2024-09-13 PROCEDURE — 2500000004 HC RX 250 GENERAL PHARMACY W/ HCPCS (ALT 636 FOR OP/ED): Performed by: SURGERY

## 2024-09-13 PROCEDURE — 99233 SBSQ HOSP IP/OBS HIGH 50: CPT | Performed by: NURSE PRACTITIONER

## 2024-09-13 PROCEDURE — 85027 COMPLETE CBC AUTOMATED: CPT | Performed by: SURGERY

## 2024-09-13 PROCEDURE — 85730 THROMBOPLASTIN TIME PARTIAL: CPT | Performed by: SURGERY

## 2024-09-13 RX ORDER — METOPROLOL TARTRATE 25 MG/1
25 TABLET, FILM COATED ORAL 2 TIMES DAILY
Qty: 30 TABLET | Refills: 3 | Status: SHIPPED | OUTPATIENT
Start: 2024-09-13

## 2024-09-13 RX ORDER — LOSARTAN POTASSIUM 25 MG/1
25 TABLET ORAL DAILY
Qty: 30 TABLET | Refills: 3 | Status: SHIPPED | OUTPATIENT
Start: 2024-09-13

## 2024-09-13 ASSESSMENT — PAIN SCALES - GENERAL
PAINLEVEL_OUTOF10: 0 - NO PAIN

## 2024-09-13 ASSESSMENT — PAIN - FUNCTIONAL ASSESSMENT
PAIN_FUNCTIONAL_ASSESSMENT: 0-10
PAIN_FUNCTIONAL_ASSESSMENT: 0-10

## 2024-09-13 NOTE — DISCHARGE SUMMARY
Discharge Diagnosis  Other acute pulmonary embolism without acute cor pulmonale (Multi)    Issues Requiring Follow-Up  Repeat ECHO to assess EF  Follow up with heart failure to determine best medical therapy regimen for newly dx heart failure  Labs in 2 weeks after initiation of losartan    Test Results Pending At Discharge  Pending Labs       No current pending labs.            Hospital Course   Elise Chávez is a 47yo female with a PMH of JAK2 mutation and thrombocytosis that developed after a COVID infection in 2019, who presented with hemoptysis and concern for recurrent PE.  She has had 5 previous pulmonary emboli in the past, and two thromboembolectomy procedures, one in 2021 and a second in May 2024. She has been on long term Eliquis for anticoagulation and has recently started besremi therapy for her elevated platelets and JAK2 mutation. CT on admission did show again a large thrombus in her right main PA, the same location as all her previous clots. She was placed on a heparin gtt. 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 Upon consultation with interventional cardiology, it was felt there was not benefit to attempt a third clot removal as the distal vasculature was chronically occluded. Her echocardiogram this admission, however, showed a significantly depressed EF (35%) from her prior ECHO in May (59%) and from 2021 (70%). It also demonstrated signs of pulm HTN and right heart failure.     The patient was recommended to transfer downtown and be evaluated by a mutidisciplinary specialty team given the complexity of her situation but she declined. So we would like to at least start limited GDMT for her newly identified HF, and she ultimately agreed to start low dose losartan (12.5mg daily). We also recommended low dose beta blocker 25mg BID but she would like to wait to start that until she speaks  with her PCP and hematologist.    She has remained on room air throughout the admission and has not endorsed any shortness of breath while here although she does have chronic shortness of breath with activity.  She is stable for discharge home at this point with early follow up with her PCP and heart failure specialist.       Pertinent Physical Exam At Time of Discharge  Physical Exam  Constitutional:       General: She is not in acute distress.  HENT:      Head: Normocephalic.      Mouth/Throat:      Mouth: Mucous membranes are moist.   Eyes:      Pupils: Pupils are equal, round, and reactive to light.   Cardiovascular:      Rate and Rhythm: Regular rhythm. Tachycardia present.   Pulmonary:      Effort: Pulmonary effort is normal. No respiratory distress.   Abdominal:      Palpations: Abdomen is soft.      Tenderness: There is no abdominal tenderness.   Musculoskeletal:         General: No swelling.   Skin:     General: Skin is warm.   Neurological:      General: No focal deficit present.      Mental Status: She is alert.   Psychiatric:         Mood and Affect: Mood normal.         Home Medications     Medication List      START taking these medications     losartan 25 mg tablet; Commonly known as: Cozaar; Take 1 tablet (25 mg)   by mouth once daily.; Notes to patient: TAKE ONE HALF TABLET DAILY UNTIL   YOU HAVE SEEN YOUR PCP   metoprolol tartrate 25 mg tablet; Commonly known as: Lopressor; Take 1   tablet (25 mg) by mouth 2 times a day. DO NOT TAKE if blood pressure is   less than 100 or heart rate is less than 55     CONTINUE taking these medications     apixaban 5 mg tablet; Commonly known as: Eliquis; Take 1 tablet (5 mg)   by mouth every 12 hours.   Besremi subcutaneous syringe; Generic drug: ropeginterferon [id number]     STOP taking these medications     cyclobenzaprine 10 mg tablet; Commonly known as: Flexeril   PANCREATIN (BULK) MISC       Outpatient Follow-Up  Future Appointments   Date Time Provider  Department Center   9/23/2024  9:30 AM Ante T Pletikosic, DO [id number] [facility]   10/10/2024 10:00 AM Bunny Bullard MD [id number] [facility]       Zuleyma iSlva MD

## 2024-09-13 NOTE — PROGRESS NOTES
09/13/24 1253   Discharge Planning   Expected Discharge Disposition Home         Patient is in with + PE. She is on Heparin drip. Possible going home today with no needs identified.

## 2024-09-13 NOTE — PROGRESS NOTES
Medication Education     Medication education for Elise Chávez was provided to the patient  for the following medication(s):  Losartan      Medication education provided by a Pharmacist:  ADR Counseling Dose, frequency, storage How the medication works and benefits of taking it Importance of compliance    Identified potential barriers to education:  None    Method(s) of Education:  Verbal Written materials provided and reviewed    An opportunity to ask questions and receive answers was provided.     Assessment of understanding the patient :  2= meets goals/outcomes    Additional Notes (if applicable): Patient is agreeable to start losartan outpatient after discussing with her PCP and hematologist. Explained losartan has symptomatic AND survival benefits for patients with reduced ejection fraction. Patient was concerned about experiencing hypotension and I explained we would start at the lowest dose and titrate only as her blood pressure would allow.      Lindsay Meredith, PharmD

## 2024-09-13 NOTE — PROGRESS NOTES
Subjective Data:  Seen and examined. Continue to have hemoptysis   Reluctant to start GDMT secondary to side effects       Overnight Events:    N/A     Objective Data:  Last Recorded Vitals:  Vitals:    09/13/24 0300 09/13/24 0400 09/13/24 0600 09/13/24 0810   BP:    105/71   BP Location:       Patient Position:       Pulse: 100 102 97 (!) 114   Resp: 18 19 15 14   Temp:    36.6 °C (97.9 °F)   TempSrc:       SpO2: 91% (!) 89% 94% 95%   Weight:   56.7 kg (125 lb)    Height:           Last Labs:  LABS:  CMP:  Results from last 7 days   Lab Units 09/12/24  0339   SODIUM mmol/L 140   POTASSIUM mmol/L 3.6   CHLORIDE mmol/L 101   CO2 mmol/L 27   ANION GAP mmol/L 16   BUN mg/dL 24*   CREATININE mg/dL 1.02   EGFR mL/min/1.73m*2 69   ALBUMIN g/dL 4.2   ALT U/L 13   AST U/L 17   BILIRUBIN TOTAL mg/dL 0.6     CBC:  Results from last 7 days   Lab Units 09/13/24  0903 09/12/24  1139 09/12/24  0339 09/09/24  1333   WBC AUTO x10*3/uL 17.2* 21.7* 23.3* 19.7*   HEMOGLOBIN g/dL 10.3* 12.7 13.9 14.1   HEMATOCRIT % 33.6* 39.4 44.6 46.5*   PLATELETS AUTO x10*3/uL 1,199* 1,498* 1,591* 1,757*   MCV fL 80 77* 79* 80     COAG:   Results from last 7 days   Lab Units 09/12/24  0339   INR  1.5*     ABO:   ABO TYPE   Date Value Ref Range Status   09/12/2024 A  Final     HEME/ENDO:  Results from last 7 days   Lab Units 09/12/24  0526   TSH mIU/L 2.19      CARDIAC:   Results from last 7 days   Lab Units 09/12/24  0526 09/12/24  0339   TROPHS ng/L 8 13   BNP pg/mL  --  49     Recent Labs     08/26/24  1130 12/11/23  1116 10/18/22  1502 06/17/22  1315 02/26/21  1204   CHOL 212* 242* 271* 247* 268*   LDLF  --   --  195* 172* 205*   LDLCALC 145* 165*  --   --   --    HDL 49.1 53.4 58.4 59.8 40.8   TRIG 91 119 86 74 109      Imagine Results  Transthoracic Echo (TTE) Complete   Final Result      CT angio chest for pulmonary embolism   Final Result   Large right-sided pulmonary embolus completely occluding the right   pulmonary arterial system beginning  in the right main pulmonary artery   with evidence of right heart strain.   Question early pulmonary infarct in the right lower lobe.   Mosaic attenuation in the lungs suggesting chronic small airway   disease and air trapping.   Pulmonary nodules in the right upper lobe measuring up to 9 mm,   follow-up per Fleischner Society Guidelines.   NOTIFICATION:  The critical results of the study were discussed with,   and acknowledged by Dr. Marcelo Elizondo by telephone on 09/12/2024 at   0521.   Fleischner Society 2017 Guidelines for Management of Incidentally   Detected Pulmonary Nodules in Adults:   A.  SOLID NODULES*   Nodule Type and Size:   Single:   *Low Risk**    < 6 mm - No routine follow-up   6-8 mm - CT at 6-12 months, then consider CT at 18-24 months   > 8 mm - Consider CT at 3 months, PET/CT, or tissue sampling   *High Risk**   < 6 mm - Optional CT at 12 months   6-8 mm - CT at 6-12 months, then CT at 18-24 months   > 8 mm - Consider CT at 3 months, PET/CT, or tissue sampling   Multiple:   *Low Risk**    < 6 mm - No routine follow-up   6-8 mm - CT at 3-6 months, then consider CT at 18-24 months   > 8 mm - CT at 3-6 months, then consider CT at 18-24 months   *High Risk**   < 6 mm - Optional CT at 12 months   6-8 mm - CT at 3-6 months, then at 18-24 months   > 8 mm - CT at 3-6 months, then at 18-24 months   B. SUBSOLID NODULES*   Nodule Type and Size:   Single:     *Ground glass   < 6 mm - No routine follow-up   > 6 mm - CT at 6-12 months to confirm persistence, then CT every 2   years until 5 years   *Part Solid   < 6 mm - No routine follow-up   > 6 mm - CT at 3-6 months to confirm persistence.  If unchanged and   solid component remains < 6 mm, annual CT should be performed for 5   years.   Multiple:   < 6 mm - CT at 3-6 months.  If stable, consider CT at 2 and 4 years.   > 6 mm - CT at 3-6 months.  Subsequent management based on the most   suspicious nodule(s).   Note - These recommendations do not apply to  lung cancer screening,   patients with immunosuppression, or patients with known primary   cancer.   * Dimensions are average of long and short axes, rounded to the   nearest millimeter.   ** Consider all relevant risk factors.   Signed by Gutierrez Woods            Last I/O:  No intake/output data recorded.    Past Cardiology Tests (Last 3 Years):  EKG:  ECG 12 lead 09/12/2024 (Preliminary)    Echo:  Transthoracic Echo (TTE) Complete 09/12/2024   1. Left ventricular ejection fraction is moderately decreased, by visual estimate at 35%.   2. There is global hypokinesis of the left ventricle with minor regional variations.   3. Left ventricular cavity size is decreased.   4. Stokes's sign is present, suggestive of pulmonary embolism.   5. Right ventricular volume and pressure overload.   6. Severely elevated right ventricular systolic pressure.   7. There is reduced right ventricular systolic function.   8. Moderately enlarged right ventricle.   9. There has been a decline in the estmiated LVEF from 70-75% to currently 35% on a limited study. Diastology was not assessed. There has been an increase in the estimated RVSP from 68 mmHg to currently 81 mmHg.    9/2/2021   1. The left ventricular systolic function is hyperdynamic with a 70-75% estimated ejection fraction.   2. Spectral Doppler shows an impaired relaxation pattern of left ventricular diastolic filling.   3. Moderate to severely elevated right ventricular systolic pressure.    2/15/2021   1. The left ventricular systolic function is normal with a 55% estimated ejection fraction.   2. Spectral Doppler shows an impaired relaxation pattern of left ventricular diastolic filling.   3. The findings are consistent with pulmonary hypertension.   4. There is a small to moderate pericardial effusion.   5. There is no evidence of cardiac tamponade.   6. Mild to moderately elevated right ventricular systolic pressure.      Ejection Fractions:  EF   Date/Time Value Ref  Range Status   09/12/2024 12:39 PM 35 %      Cath:  No results found for this or any previous visit from the past 1095 days.    Stress Test:  No results found for this or any previous visit from the past 1095 days.    Cardiac Imaging:  No results found for this or any previous visit from the past 1095 days.      Inpatient Medications:  Scheduled medications   Medication Dose Route Frequency     PRN medications   Medication    heparin     Continuous Medications   Medication Dose Last Rate    heparin  1,400 Units/hr 1,400 Units/hr (09/13/24 0816)        Assessment/Plan   1-chronic thrombotic occlusion of right PA:  -No evidence of acute submassive PE this admission based on symptoms and biomarkers.  Patient's only new symptom is hemoptysis, no worsening of her baseline VARGAS or chest pain.  Patient has baseline resting tachycardia around 120 bpm. BNP and serial troponin are negative.  -Patient had history of multiple pulmonary embolisms status post attempted percutaneous pulmonary thrombectomy at  by Dr. Hatfield in 09/2021 with minimal clot retrieved [final pulmonary angiogram with occluded anterior truncus and right interlobar arteries] and another pulmonary thrombectomy in [place] on 05/15/2024 with good amount of clot retrieved [patient has a picture of clot retrieved]; however, CTA at outside hospital from 5/28/2024 [15 days later] was largely unchanged from the above 2021 angiogram, it is likely that the clot was retrieved from the main right PA but the right anterior truncus and interlobar arteries remained occluded.   -CT scans admission showed again occluded right PA.  -Echocardiogram from this admission is consistent with severe chronic pulmonary hypertension with RV dysfunction [RVSP is 80 mmHg, was 68 mmHg from 2021] and moderate TR.  -Patient has JAK2 mutation and essential thrombocythemia, she was started recently on Besremi by her [place] hematologist.  -Patient has been on different DOAC's with reported  hemoptysis before.  Reportedly, she has refused warfarin.  She also reported that she has been off any anticoagulation briefly in the past trying some natural herbal blood thinners.  -No indication for advanced intervention.  -Patient will need to be evaluated for CTEPH.  Patient will need right and left heart catheterization eventually.  -Patient will also need to be evaluated by vascular medicine for anticoagulation management.  -Continue heparin drip for now, monitor for hemoptysis.     2-Cardiomyopathy/depressed LVEF:  -Repeat echocardiogram this admission with mild depressed LVEF around 40%, although it is hard to accurately assess given resting tachycardia and and LV underfilling.  -Patient reluctant to initiate GDMT secondary to concerns for side effect.  Patient given drug information to GDMT. She states she wants more time to think about medication.    -When aggreable, can start  start ACE or ARB, and later SGLT2 inhibitor, careful with beta-blocker for now given her compensatory sinus tachycardia.  No indication for diuresis [very careful given RV failure].  -Patient would benefit from a repeat echocardiogram when her heart rate is within normal limit.     3-pericardial fusion:  -Small and mainly apical and anterior.  -It could be a bad prognostic sign especially with her severe pulmonary hypertension.  -Management as above.    Cardiology will sign off  Please call with questions     Discussed with Dr. Juárez      Code Status:  Full Code      Noam Blanc, MARILU-CNP

## 2024-09-13 NOTE — PROGRESS NOTES
Pharmacy Medication History Review~~~PATIENT VERIFIED ALL MEDICATIONS AND DOSES~~~~    Elise Chávez is a 48 y.o. female admitted for Other acute pulmonary embolism without acute cor pulmonale (Multi). Pharmacy reviewed the patient's [id number] medications and allergies for accuracy.    The list below reflectives the updated PTA list. Please review each medication in order reconciliation for additional clarification and justification.  Prior to Admission Medications   Prescriptions Last Dose Informant     PANCREATIN, BULK, MISC 9/11/2024      Sig: Take 1 tablet by mouth 3 times a day. 8X pancreatin   apixaban (Eliquis) 5 mg tablet 9/11/2024      Sig: Take 1 tablet (5 mg) by mouth every 12 hours.                ropeginterferon [id number] (Besremi) subcutaneous syringe 9/9/2024      Sig: Inject 0.2 mL (100 mcg) under the skin every 14 (fourteen) days.      Facility-Administered Medications: None      Allergies  Reviewed by Aleksandra Yoder on 9/13/2024   No Known Allergies         Below are additional concerns with the patient's PTA list.  The following updates were made to the Prior to Admission medication list:     Source of Information:     Medications ADDED:   N/A  Medications CHANGED:  N/A  Medications REMOVED:   CYCLOBENZAPRINE 10MG  Medications NOT TAKING:   N/A    Allergy reviewed : Yes    Comments: Patient verified all medications and doses.     Aleksandra Yoder

## 2024-09-13 NOTE — SIGNIFICANT EVENT
"Hematology is consulted for anticoagulation recommendations    /71   Pulse (!) 121   Temp 36.6 °C (97.9 °F)   Resp 23   Ht 1.626 m (5' 4\")   Wt 56.7 kg (125 lb)   LMP  (LMP Unknown)   SpO2 95%   BMI 21.46 kg/m²     - recommend stopping nattokinase, she is awaiting to hear from Dr. Franklin for his recommendation  - recommended resuming Apixaban at discharge with strict compliance to every 12 hours twice per day and setting a phone reminder to ensure compliance and prevent overlap  -  will continue ropeginterferon [id number] 100 MCG for ET with  Dr. Cazares per recommendations by Dr. Franklin, she will f/up with Dr. Franklin in [place]  - cardiology recommended treatment for HFrEF    Results for orders placed or performed during the hospital encounter of 09/12/24 (from the past 96 hour(s))   CBC and Auto Differential   Result Value Ref Range    WBC 23.3 (H) 4.4 - 11.3 x10*3/uL    nRBC 0.0 0.0 - 0.0 /100 WBCs    RBC 5.67 (H) 4.00 - 5.20 x10*6/uL    Hemoglobin 13.9 12.0 - 16.0 g/dL    Hematocrit 44.6 36.0 - 46.0 %    MCV 79 (L) 80 - 100 fL    MCH 24.5 (L) 26.0 - 34.0 pg    MCHC 31.2 (L) 32.0 - 36.0 g/dL    RDW 21.9 (H) 11.5 - 14.5 %    Platelets 1,591 (H) 150 - 450 x10*3/uL    Neutrophils % 68.8 40.0 - 80.0 %    Immature Granulocytes %, Automated 0.5 0.0 - 0.9 %    Lymphocytes % 18.8 13.0 - 44.0 %    Monocytes % 7.3 2.0 - 10.0 %    Eosinophils % 3.6 0.0 - 6.0 %    Basophils % 1.0 0.0 - 2.0 %    Neutrophils Absolute 16.05 (H) 1.20 - 7.70 x10*3/uL    Immature Granulocytes Absolute, Automated 0.12 0.00 - 0.70 x10*3/uL    Lymphocytes Absolute 4.37 1.20 - 4.80 x10*3/uL    Monocytes Absolute 1.69 (H) 0.10 - 1.00 x10*3/uL    Eosinophils Absolute 0.83 (H) 0.00 - 0.70 x10*3/uL    Basophils Absolute 0.24 (H) 0.00 - 0.10 x10*3/uL   Comprehensive metabolic panel   Result Value Ref Range    Glucose 94 74 - 99 mg/dL    Sodium 140 136 - 145 mmol/L    Potassium 3.6 3.5 - 5.3 mmol/L    Chloride 101 98 - 107 mmol/L    " Bicarbonate 27 21 - 32 mmol/L    Anion Gap 16 10 - 20 mmol/L    Urea Nitrogen 24 (H) 6 - 23 mg/dL    Creatinine 1.02 0.50 - 1.05 mg/dL    eGFR 69 >60 mL/min/1.73m*2    Calcium 9.9 8.6 - 10.3 mg/dL    Albumin 4.2 3.4 - 5.0 g/dL    Alkaline Phosphatase 114 (H) 33 - 110 U/L    Total Protein 8.4 (H) 6.4 - 8.2 g/dL    AST 17 9 - 39 U/L    Bilirubin, Total 0.6 0.0 - 1.2 mg/dL    ALT 13 7 - 45 U/L   Coagulation Screen   Result Value Ref Range    Protime 17.5 (H) 9.8 - 12.8 seconds    INR 1.5 (H) 0.9 - 1.1    aPTT 44 (H) 27 - 38 seconds   Troponin I, High Sensitivity, Initial   Result Value Ref Range    Troponin I, High Sensitivity 13 0 - 13 ng/L   Pathologist Review-CBC Differential   Result Value Ref Range    Pathologist Review-CBC Differential       Leukocytosis with neutrophilia and increased monocytes, eosinophils and basophils  Mild microcytosis with many odilon cells, and red blood cell fragments  Marked thrombocytosis, rare giant platelets present, see note    Note: Review of electronic medical record demonstrates clinical history of JAK2 mutated essential thrombocythemia, clinical correlation needed with consideration for hematology evaluation     B-type natriuretic peptide   Result Value Ref Range    BNP 49 0 - 99 pg/mL   ECG 12 lead   Result Value Ref Range    Ventricular Rate 137 BPM    Atrial Rate 137 BPM    NM Interval 148 ms    QRS Duration 86 ms    QT Interval 280 ms    QTC Calculation(Bazett) 422 ms    P Axis 63 degrees    R Axis 91 degrees    T Axis 81 degrees    QRS Count 23 beats    Q Onset 211 ms    P Onset 137 ms    P Offset 190 ms    T Offset 351 ms    QTC Fredericia 368 ms   Troponin, High Sensitivity, 1 Hour   Result Value Ref Range    Troponin I, High Sensitivity 8 0 - 13 ng/L   Type and Screen   Result Value Ref Range    ABO TYPE A     Rh TYPE POS     ANTIBODY SCREEN NEG    TSH with reflex to Free T4 if abnormal   Result Value Ref Range    Thyroid Stimulating Hormone 2.19 0.44 - 3.98 mIU/L   Lactate    Result Value Ref Range    Lactate 1.3 0.4 - 2.0 mmol/L   Heparin Assay   Result Value Ref Range    Heparin Unfractionated 1.5 (HH) See Comment Below for Therapeutic Ranges IU/mL   Heparin Assay, UFH   Result Value Ref Range    Heparin Unfractionated 1.1 (HH) See Comment Below for Therapeutic Ranges IU/mL   aPTT - baseline   Result Value Ref Range    aPTT 39 (H) 27 - 38 seconds   CBC   Result Value Ref Range    WBC 21.7 (H) 4.4 - 11.3 x10*3/uL    nRBC 0.0 0.0 - 0.0 /100 WBCs    RBC 5.09 4.00 - 5.20 x10*6/uL    Hemoglobin 12.7 12.0 - 16.0 g/dL    Hematocrit 39.4 36.0 - 46.0 %    MCV 77 (L) 80 - 100 fL    MCH 25.0 (L) 26.0 - 34.0 pg    MCHC 32.2 32.0 - 36.0 g/dL    RDW 21.6 (H) 11.5 - 14.5 %    Platelets 1,498 (H) 150 - 450 x10*3/uL   Transthoracic Echo (TTE) Complete   Result Value Ref Range    AV pk uvaldo 0.77 m/s    LVOT diam 2.02 cm    LV EF 35 %    LVIDd 3.48 cm    RVSP 80.8 mmHg    AV pk grad 2.4 mmHg    LV A4C EF 34.9    APTT   Result Value Ref Range    aPTT 39 (H) 27 - 38 seconds   APTT   Result Value Ref Range    aPTT 44 (H) 27 - 38 seconds   APTT   Result Value Ref Range    aPTT 69 (H) 27 - 38 seconds   APTT   Result Value Ref Range    aPTT 64 (H) 27 - 38 seconds   APTT   Result Value Ref Range    aPTT 72 (H) 27 - 38 seconds   CBC   Result Value Ref Range    WBC 17.2 (H) 4.4 - 11.3 x10*3/uL    nRBC 0.0 0.0 - 0.0 /100 WBCs    RBC 4.20 4.00 - 5.20 x10*6/uL    Hemoglobin 10.3 (L) 12.0 - 16.0 g/dL    Hematocrit 33.6 (L) 36.0 - 46.0 %    MCV 80 80 - 100 fL    MCH 24.5 (L) 26.0 - 34.0 pg    MCHC 30.7 (L) 32.0 - 36.0 g/dL    RDW 21.4 (H) 11.5 - 14.5 %    Platelets 1,199 (H) 150 - 450 x10*3/uL   APTT   Result Value Ref Range    aPTT 73 (H) 27 - 38 seconds       NO HEMATOLOGY COVERAGE OVER THE WEEKEND PLEASE CALL TRC WITH QUESTIONS OF CONCERNS    Celia Enrique, APRN-CNP

## 2024-09-14 LAB
ATRIAL RATE: 137 BPM
P AXIS: 63 DEGREES
P OFFSET: 190 MS
P ONSET: 137 MS
PR INTERVAL: 148 MS
Q ONSET: 211 MS
QRS COUNT: 23 BEATS
QRS DURATION: 86 MS
QT INTERVAL: 280 MS
QTC CALCULATION(BAZETT): 422 MS
QTC FREDERICIA: 368 MS
R AXIS: 91 DEGREES
T AXIS: 81 DEGREES
T OFFSET: 351 MS
VENTRICULAR RATE: 137 BPM

## 2024-09-17 ENCOUNTER — PATIENT OUTREACH (OUTPATIENT)
Dept: CARE COORDINATION | Facility: CLINIC | Age: 49
End: 2024-09-17
Payer: COMMERCIAL

## 2024-09-17 NOTE — PROGRESS NOTES
Outreach call to patient to support a smooth transition of care from recent admission.  Left voicemail message for patient with my contact information.  Mya SWIFT, RN, [facility]  O: [phone]        no

## 2024-09-23 ENCOUNTER — LAB (OUTPATIENT)
Dept: LAB | Facility: LAB | Age: 49
End: 2024-09-23
Payer: COMMERCIAL

## 2024-09-23 ENCOUNTER — APPOINTMENT (OUTPATIENT)
Dept: PRIMARY CARE | Facility: CLINIC | Age: 49
End: 2024-09-23
Payer: COMMERCIAL

## 2024-09-23 VITALS
SYSTOLIC BLOOD PRESSURE: 100 MMHG | OXYGEN SATURATION: 94 % | BODY MASS INDEX: 21.63 KG/M2 | WEIGHT: 126 LBS | DIASTOLIC BLOOD PRESSURE: 70 MMHG | HEART RATE: 136 BPM

## 2024-09-23 DIAGNOSIS — I26.99 MULTIPLE PULMONARY EMBOLI (MULTI): ICD-10-CM

## 2024-09-23 DIAGNOSIS — R79.89 ABNORMAL CBC: ICD-10-CM

## 2024-09-23 DIAGNOSIS — D47.3 ESSENTIAL (HEMORRHAGIC) THROMBOCYTHEMIA: ICD-10-CM

## 2024-09-23 DIAGNOSIS — Z09 HOSPITAL DISCHARGE FOLLOW-UP: Primary | ICD-10-CM

## 2024-09-23 DIAGNOSIS — R00.0 TACHYCARDIA: ICD-10-CM

## 2024-09-23 DIAGNOSIS — Z09 HOSPITAL DISCHARGE FOLLOW-UP: ICD-10-CM

## 2024-09-23 LAB
ACANTHOCYTES BLD QL SMEAR: NORMAL
ALBUMIN SERPL BCP-MCNC: 4 G/DL (ref 3.4–5)
ALP SERPL-CCNC: 92 U/L (ref 33–110)
ALT SERPL W P-5'-P-CCNC: 12 U/L (ref 7–45)
ANION GAP SERPL CALC-SCNC: 13 MMOL/L (ref 10–20)
AST SERPL W P-5'-P-CCNC: 15 U/L (ref 9–39)
BASOPHILS # BLD AUTO: 0.23 X10*3/UL (ref 0–0.1)
BASOPHILS NFR BLD AUTO: 1.3 %
BILIRUB SERPL-MCNC: 0.6 MG/DL (ref 0–1.2)
BUN SERPL-MCNC: 18 MG/DL (ref 6–23)
BURR CELLS BLD QL SMEAR: NORMAL
CALCIUM SERPL-MCNC: 8.9 MG/DL (ref 8.6–10.3)
CHLORIDE SERPL-SCNC: 104 MMOL/L (ref 98–107)
CO2 SERPL-SCNC: 26 MMOL/L (ref 21–32)
CREAT SERPL-MCNC: 0.72 MG/DL (ref 0.5–1.05)
EGFRCR SERPLBLD CKD-EPI 2021: >90 ML/MIN/1.73M*2
EOSINOPHIL # BLD AUTO: 1.22 X10*3/UL (ref 0–0.7)
EOSINOPHIL NFR BLD AUTO: 6.7 %
ERYTHROCYTE [DISTWIDTH] IN BLOOD BY AUTOMATED COUNT: 22.6 % (ref 11.5–14.5)
GLUCOSE SERPL-MCNC: 90 MG/DL (ref 74–99)
HCT VFR BLD AUTO: 41.2 % (ref 36–46)
HGB BLD-MCNC: 12.6 G/DL (ref 12–16)
IMM GRANULOCYTES # BLD AUTO: 0.1 X10*3/UL (ref 0–0.7)
IMM GRANULOCYTES NFR BLD AUTO: 0.6 % (ref 0–0.9)
LYMPHOCYTES # BLD AUTO: 3.89 X10*3/UL (ref 1.2–4.8)
LYMPHOCYTES NFR BLD AUTO: 21.5 %
MCH RBC QN AUTO: 24.7 PG (ref 26–34)
MCHC RBC AUTO-ENTMCNC: 30.6 G/DL (ref 32–36)
MCV RBC AUTO: 81 FL (ref 80–100)
MONOCYTES # BLD AUTO: 1.21 X10*3/UL (ref 0.1–1)
MONOCYTES NFR BLD AUTO: 6.7 %
NEUTROPHILS # BLD AUTO: 11.48 X10*3/UL (ref 1.2–7.7)
NEUTROPHILS NFR BLD AUTO: 63.2 %
NRBC BLD-RTO: 0 /100 WBCS (ref 0–0)
OVALOCYTES BLD QL SMEAR: NORMAL
PLATELET # BLD AUTO: 1754 X10*3/UL (ref 150–450)
POTASSIUM SERPL-SCNC: 3.5 MMOL/L (ref 3.5–5.3)
PROT SERPL-MCNC: 8 G/DL (ref 6.4–8.2)
RBC # BLD AUTO: 5.11 X10*6/UL (ref 4–5.2)
RBC MORPH BLD: NORMAL
SCHISTOCYTES BLD QL SMEAR: NORMAL
SODIUM SERPL-SCNC: 139 MMOL/L (ref 136–145)
WBC # BLD AUTO: 18.1 X10*3/UL (ref 4.4–11.3)

## 2024-09-23 PROCEDURE — 1036F TOBACCO NON-USER: CPT | Performed by: STUDENT IN AN ORGANIZED HEALTH CARE EDUCATION/TRAINING PROGRAM

## 2024-09-23 PROCEDURE — 80053 COMPREHEN METABOLIC PANEL: CPT

## 2024-09-23 PROCEDURE — 96372 THER/PROPH/DIAG INJ SC/IM: CPT | Performed by: STUDENT IN AN ORGANIZED HEALTH CARE EDUCATION/TRAINING PROGRAM

## 2024-09-23 PROCEDURE — 85025 COMPLETE CBC W/AUTO DIFF WBC: CPT

## 2024-09-23 PROCEDURE — 99495 TRANSJ CARE MGMT MOD F2F 14D: CPT | Performed by: STUDENT IN AN ORGANIZED HEALTH CARE EDUCATION/TRAINING PROGRAM

## 2024-09-23 PROCEDURE — 36415 COLL VENOUS BLD VENIPUNCTURE: CPT

## 2024-09-23 NOTE — PROGRESS NOTES
Subjective   Patient ID: Elise Chávez is a 49 y.o. female who presents for Hemmorrhagic Thrombocythemia.  Today she is accompanied by alone.     HPI    1. 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 Franklin, who initiated BESREMi injections (ropeginterferon [id number])    She has failed multiple anticoagulants, over concerns for increased bleeding, including Pradaxa   Was on long term Eliquis 5 mg however she stopped taking it regularly after her last hospitalization in May 2024 and her holistic provider started nattokinase, a natural blood thinner   Currently receives ropeginterferon [id number] 100 mcg injections for her essential thrombocythemia    Hospital course:     ED vital signs were:   36.8 °C,   HR: 148   RR: 17   BP: 131/90  O2 at 95% on room air    In he ED, she had a Hgb: 13.9, WBC: 23.3, platelet count of 1.5 million, her troponins were negative at 8 and 13   BNP was normal at 49 and coagulation labs showed a Protime of 17.5, INR of 1.5, and aPTT of 44  CMP: Sodium: 140, Potassium: 3.6, Chloride: 101, eGFR: 69, AST: 13, ALT: 13,   Bilirubin: 0.6  EKG showed Tachycardic  sinus rhythm at a rate of 137 bpm.  Normal intervals.  Normal axis.  No signs of acute ischemia.     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   Additional HFrEf medications were deferred until she spoke with us at her visit today    On 9/13/24, Elise remained on room air and denied shortness of breath and was determined stable for discharge    Repeat CBC: WBC: 17.2, Hgb: 10.3 and platelets were 1.2 million  This may have been a manifestation due to fluid replacement causing dilutional anemia    Her discharge instructions included:  -Repeat ECHO to assess EF  -Follow up with Dr. Stevens to determine best medical therapy regimen for newly dx heart failure  -Labs in 2 weeks after initiation of losartan  -New Medications: Losartan 12.5 mg and Metoprolol 25 mg twice/daily, Eliquis 5 mg every 12 hours  -Was told to stop taking Flexeril, Pancreatin, and the nattokinase      Today's office visit:  Elise presents to discuss her recent  admission, medications and plan for future care given her newly diagnosed heart failure    She voiced her displeasure with the care she received and felt the hospital staff overreacted to her presenting complaint. She felt that her hemoptysis was secondary to taking multiple blood thinners and not due to a PE.   She believes that not enough time has passed to allow the injections to work, and remains optimistic and happy with her current plan and is not interested in any alternatives at this moment    She is also refusing to accept the diagnosis of HFrEF because of external factors that may have caused falsely abnormal imaging  She has not started taking the metoprolol and is only taking the 12.5 mg Losartan  Requesting repeat Echo to confirm her reduced heart function before she will start any additional heart failure meds    Has been taking the Eliquis 5 mg every 12 hours in addition to multiple herbal supplements given to her by her natural provider, Dr. Justin    She denies any recent hemoptysis, hematemesis, shortness of breath or chest pain      Current Outpatient Medications on File Prior to Visit   Medication Sig Dispense Refill    apixaban (Eliquis) 5 mg tablet Take 1 tablet (5 mg) by mouth every 12 hours. 180 tablet 0    losartan (Cozaar) 25 mg tablet Take 1 tablet (25 mg) by mouth once daily. 30 tablet 3    metoprolol tartrate (Lopressor) 25 mg tablet Take 1 tablet (25 mg) by mouth 2 times a day. DO NOT TAKE if blood pressure is less than 100 or heart rate is less than 55 30 tablet 3    ropeginterferon [id number] (Besremi) subcutaneous syringe Inject 0.2 mL (100 mcg) under the skin every 14 (fourteen) days.       No current facility-administered medications on file prior to visit.        No Known Allergies      There is no immunization history on file for this patient.      Review of Systems  All pertinent positive symptoms are included in the history of present illness.  All other systems have been  reviewed and are negative and noncontributory to this patient's current ailments.     Objective   /70 (BP Location: Left arm, Patient Position: Sitting, BP Cuff Size: Adult)   Pulse (!) 136   Wt 57.2 kg (126 lb)   LMP  (LMP Unknown)   SpO2 94%   BMI 21.63 kg/m²   BSA: 1.61 meters squared  Admission on 09/12/2024, Discharged on 09/13/2024   Component Date Value Ref Range Status    WBC 09/12/2024 23.3 (H)  4.4 - 11.3 x10*3/uL Final    nRBC 09/12/2024 0.0  0.0 - 0.0 /100 WBCs Final    RBC 09/12/2024 5.67 (H)  4.00 - 5.20 x10*6/uL Final    Hemoglobin 09/12/2024 13.9  12.0 - 16.0 g/dL Final    Hematocrit 09/12/2024 44.6  36.0 - 46.0 % Final    MCV 09/12/2024 79 (L)  80 - 100 fL Final    MCH 09/12/2024 24.5 (L)  26.0 - 34.0 pg Final    MCHC 09/12/2024 31.2 (L)  32.0 - 36.0 g/dL Final    RDW 09/12/2024 21.9 (H)  11.5 - 14.5 % Final    Platelets 09/12/2024 1,591 (H)  150 - 450 x10*3/uL Final    Neutrophils % 09/12/2024 68.8  40.0 - 80.0 % Final    Immature Granulocytes %, Automated 09/12/2024 0.5  0.0 - 0.9 % Final    Immature Granulocyte Count (IG) includes promyelocytes, myelocytes and metamyelocytes but does not include bands. Percent differential counts (%) should be interpreted in the context of the absolute cell counts (cells/UL).    Lymphocytes % 09/12/2024 18.8  13.0 - 44.0 % Final    Monocytes % 09/12/2024 7.3  2.0 - 10.0 % Final    Eosinophils % 09/12/2024 3.6  0.0 - 6.0 % Final    Basophils % 09/12/2024 1.0  0.0 - 2.0 % Final    Neutrophils Absolute 09/12/2024 16.05 (H)  1.20 - 7.70 x10*3/uL Final    Percent differential counts (%) should be interpreted in the context of the absolute cell counts (cells/uL).    Immature Granulocytes Absolute, Au* 09/12/2024 0.12  0.00 - 0.70 x10*3/uL Final    Lymphocytes Absolute 09/12/2024 4.37  1.20 - 4.80 x10*3/uL Final    Monocytes Absolute 09/12/2024 1.69 (H)  0.10 - 1.00 x10*3/uL Final    Eosinophils Absolute 09/12/2024 0.83 (H)  0.00 - 0.70 x10*3/uL Final     Basophils Absolute 09/12/2024 0.24 (H)  0.00 - 0.10 x10*3/uL Final    Glucose 09/12/2024 94  74 - 99 mg/dL Final    Sodium 09/12/2024 140  136 - 145 mmol/L Final    Potassium 09/12/2024 3.6  3.5 - 5.3 mmol/L Final    Chloride 09/12/2024 101  98 - 107 mmol/L Final    Bicarbonate 09/12/2024 27  21 - 32 mmol/L Final    Anion Gap 09/12/2024 16  10 - 20 mmol/L Final    Urea Nitrogen 09/12/2024 24 (H)  6 - 23 mg/dL Final    Creatinine 09/12/2024 1.02  0.50 - 1.05 mg/dL Final    eGFR 09/12/2024 69  >60 mL/min/1.73m*2 Final    Calculations of estimated GFR are performed using the 2021 CKD-EPI Study Refit equation without the race variable for the IDMS-Traceable creatinine methods.  https://jasn.asnjournals.org/content/early/2021/09/22/ASN.4512980382    Calcium 09/12/2024 9.9  8.6 - 10.3 mg/dL Final    Albumin 09/12/2024 4.2  3.4 - 5.0 g/dL Final    Alkaline Phosphatase 09/12/2024 114 (H)  33 - 110 U/L Final    Total Protein 09/12/2024 8.4 (H)  6.4 - 8.2 g/dL Final    AST 09/12/2024 17  9 - 39 U/L Final    Bilirubin, Total 09/12/2024 0.6  0.0 - 1.2 mg/dL Final    ALT 09/12/2024 13  7 - 45 U/L Final    Patients treated with Sulfasalazine may generate falsely decreased results for ALT.    Protime 09/12/2024 17.5 (H)  9.8 - 12.8 seconds Final    INR 09/12/2024 1.5 (H)  0.9 - 1.1 Final    aPTT 09/12/2024 44 (H)  27 - 38 seconds Final    Ventricular Rate 09/12/2024 137  BPM Final    Atrial Rate 09/12/2024 137  BPM Final    SD Interval 09/12/2024 148  ms Final    QRS Duration 09/12/2024 86  ms Final    QT Interval 09/12/2024 280  ms Final    QTC Calculation(Bazett) 09/12/2024 422  ms Final    P Axis 09/12/2024 63  degrees Final    R Axis 09/12/2024 91  degrees Final    T Axis 09/12/2024 81  degrees Final    QRS Count 09/12/2024 23  beats Final    Q Onset 09/12/2024 211  ms Final    P Onset 09/12/2024 137  ms Final    P Offset 09/12/2024 190  ms Final    T Offset 09/12/2024 351  ms Final    QTC Fredericia 09/12/2024 368  ms Final     Troponin I, High Sensitivity 09/12/2024 13  0 - 13 ng/L Final    Troponin I, High Sensitivity 09/12/2024 8  0 - 13 ng/L Final    ABO TYPE 09/12/2024 A   Final    Rh TYPE 09/12/2024 POS   Final    ANTIBODY SCREEN 09/12/2024 NEG   Final    Pathologist Review-CBC Differential 09/12/2024    Final                    Value:Leukocytosis with neutrophilia and increased monocytes, eosinophils and basophils  Mild microcytosis with many odilon cells, and red blood cell fragments  Marked thrombocytosis, rare giant platelets present, see note    Note: Review of electronic medical record demonstrates clinical history of JAK2 mutated essential thrombocythemia, clinical correlation needed with consideration for hematology evaluation      Electronically signed out by Vincent Ny DO on 9/12/24 at 1:50 PM.  By the signature on this report, the individual or group listed as making the Final Interpretation/Diagnosis certifies that they have reviewed this case.    BNP 09/12/2024 49  0 - 99 pg/mL Final    Lactate 09/12/2024 1.3  0.4 - 2.0 mmol/L Final    Thyroid Stimulating Hormone 09/12/2024 2.19  0.44 - 3.98 mIU/L Final    Heparin Unfractionated 09/12/2024 1.5 (HH)  See Comment Below for Therapeutic Ranges IU/mL Final    Heparin Unfractionated 09/12/2024 1.1 (HH)  See Comment Below for Therapeutic Ranges IU/mL Final    aPTT 09/12/2024 39 (H)  27 - 38 seconds Final    WBC 09/12/2024 21.7 (H)  4.4 - 11.3 x10*3/uL Final    nRBC 09/12/2024 0.0  0.0 - 0.0 /100 WBCs Final    RBC 09/12/2024 5.09  4.00 - 5.20 x10*6/uL Final    Hemoglobin 09/12/2024 12.7  12.0 - 16.0 g/dL Final    Hematocrit 09/12/2024 39.4  36.0 - 46.0 % Final    MCV 09/12/2024 77 (L)  80 - 100 fL Final    MCH 09/12/2024 25.0 (L)  26.0 - 34.0 pg Final    MCHC 09/12/2024 32.2  32.0 - 36.0 g/dL Final    RDW 09/12/2024 21.6 (H)  11.5 - 14.5 % Final    Platelets 09/12/2024 1,498 (H)  150 - 450 x10*3/uL Final    AV pk uvaldo 09/12/2024 0.77  m/s Final    LVOT diam 09/12/2024 2.02   cm Final    LV EF 09/12/2024 35  % Final    LVIDd 09/12/2024 3.48  cm Final    RVSP 09/12/2024 80.8  mmHg Final    AV pk grad 09/12/2024 2.4  mmHg Final    LV A4C EF 09/12/2024 34.9   Final    aPTT 09/12/2024 39 (H)  27 - 38 seconds Final    aPTT 09/12/2024 44 (H)  27 - 38 seconds Final    aPTT 09/12/2024 69 (H)  27 - 38 seconds Final    aPTT 09/13/2024 64 (H)  27 - 38 seconds Final    aPTT 09/13/2024 72 (H)  27 - 38 seconds Final    WBC 09/13/2024 17.2 (H)  4.4 - 11.3 x10*3/uL Final    nRBC 09/13/2024 0.0  0.0 - 0.0 /100 WBCs Final    RBC 09/13/2024 4.20  4.00 - 5.20 x10*6/uL Final    Hemoglobin 09/13/2024 10.3 (L)  12.0 - 16.0 g/dL Final    Hematocrit 09/13/2024 33.6 (L)  36.0 - 46.0 % Final    MCV 09/13/2024 80  80 - 100 fL Final    MCH 09/13/2024 24.5 (L)  26.0 - 34.0 pg Final    MCHC 09/13/2024 30.7 (L)  32.0 - 36.0 g/dL Final    RDW 09/13/2024 21.4 (H)  11.5 - 14.5 % Final    Platelets 09/13/2024 1,199 (H)  150 - 450 x10*3/uL Final    aPTT 09/13/2024 73 (H)  27 - 38 seconds Final   Lab on 09/09/2024   Component Date Value Ref Range Status    WBC 09/09/2024 19.7 (H)  4.4 - 11.3 x10*3/uL Final    nRBC 09/09/2024 0.0  0.0 - 0.0 /100 WBCs Final    RBC 09/09/2024 5.79 (H)  4.00 - 5.20 x10*6/uL Final    Hemoglobin 09/09/2024 14.1  12.0 - 16.0 g/dL Final    Hematocrit 09/09/2024 46.5 (H)  36.0 - 46.0 % Final    MCV 09/09/2024 80  80 - 100 fL Final    MCH 09/09/2024 24.4 (L)  26.0 - 34.0 pg Final    MCHC 09/09/2024 30.3 (L)  32.0 - 36.0 g/dL Final    RDW 09/09/2024 22.3 (H)  11.5 - 14.5 % Final    Platelets 09/09/2024 1,757 (H)  150 - 450 x10*3/uL Final    Neutrophils % 09/09/2024 66.9  40.0 - 80.0 % Final    Immature Granulocytes %, Automated 09/09/2024 0.4  0.0 - 0.9 % Final    Immature Granulocyte Count (IG) includes promyelocytes, myelocytes and metamyelocytes but does not include bands. Percent differential counts (%) should be interpreted in the context of the absolute cell counts (cells/UL).    Lymphocytes %  09/09/2024 18.9  13.0 - 44.0 % Final    Monocytes % 09/09/2024 6.6  2.0 - 10.0 % Final    Eosinophils % 09/09/2024 5.9  0.0 - 6.0 % Final    Basophils % 09/09/2024 1.3  0.0 - 2.0 % Final    Neutrophils Absolute 09/09/2024 13.18 (H)  1.20 - 7.70 x10*3/uL Final    Percent differential counts (%) should be interpreted in the context of the absolute cell counts (cells/uL).    Immature Granulocytes Absolute, Au* 09/09/2024 0.07  0.00 - 0.70 x10*3/uL Final    Lymphocytes Absolute 09/09/2024 3.71  1.20 - 4.80 x10*3/uL Final    Monocytes Absolute 09/09/2024 1.30 (H)  0.10 - 1.00 x10*3/uL Final    Eosinophils Absolute 09/09/2024 1.16 (H)  0.00 - 0.70 x10*3/uL Final    Basophils Absolute 09/09/2024 0.26 (H)  0.00 - 0.10 x10*3/uL Final    RBC Morphology 09/09/2024 See Below   Final    RBC Fragments 09/09/2024 Few   Final    Ovalocytes 09/09/2024 Many   Final    [place] Cells 09/09/2024 Few   Final   Lab on 08/26/2024   Component Date Value Ref Range Status    Thyroid Stimulating Hormone 08/26/2024 2.12  0.44 - 3.98 mIU/L Final    Cholesterol 08/26/2024 212 (H)  0 - 199 mg/dL Final          Age      Desirable   Borderline High   High     0-19 Y     0 - 169       170 - 199     >/= 200    20-24 Y     0 - 189       190 - 224     >/= 225         >24 Y     0 - 199       200 - 239     >/= 240   **All ranges are based on fasting samples. Specific   therapeutic targets will vary based on patient-specific   cardiac risk.    Pediatric guidelines reference:Pediatrics 2011, 128(S5).Adult guidelines reference: NCEP ATPIII Guidelines,GERARDO 2001, 258:2486-97    Venipuncture immediately after or during the administration of Metamizole may lead to falsely low results. Testing should be performed immediately prior to Metamizole dosing.    HDL-Cholesterol 08/26/2024 49.1  mg/dL Final      Age       Very Low   Low     Normal    High    0-19 Y    < 35      < 40     40-45     ----  20-24 Y    ----     < 40      >45      ----        >24 Y      ----     <  40     40-60      >60      Cholesterol/HDL Ratio 08/26/2024 4.3   Final      Ref Values  Desirable  < 3.4  High Risk  > 5.0    LDL Calculated 08/26/2024 145 (H)  <=99 mg/dL Final                                Near   Borderline      AGE      Desirable  Optimal    High     High     Very High     0-19 Y     0 - 109     ---    110-129   >/= 130     ----    20-24 Y     0 - 119     ---    120-159   >/= 160     ----      >24 Y     0 -  99   100-129  130-159   160-189     >/=190      VLDL 08/26/2024 18  0 - 40 mg/dL Final    Triglycerides 08/26/2024 91  0 - 149 mg/dL Final       Age         Desirable   Borderline High   High     Very High   0 D-90 D    19 - 174         ----         ----        ----  91 D- 9 Y     0 -  74        75 -  99     >/= 100      ----    10-19 Y     0 -  89        90 - 129     >/= 130      ----    20-24 Y     0 - 114       115 - 149     >/= 150      ----         >24 Y     0 - 149       150 - 199    200- 499    >/= 500    Venipuncture immediately after or during the administration of Metamizole may lead to falsely low results. Testing should be performed immediately prior to Metamizole dosing.    Non HDL Cholesterol 08/26/2024 163 (H)  0 - 149 mg/dL Final          Age       Desirable   Borderline High   High     Very High     0-19 Y     0 - 119       120 - 144     >/= 145    >/= 160    20-24 Y     0 - 149       150 - 189     >/= 190      ----         >24 Y    30 mg/dL above LDL Cholesterol goal      Glucose 08/26/2024 92  74 - 99 mg/dL Final    Sodium 08/26/2024 138  136 - 145 mmol/L Final    Potassium 08/26/2024 3.6  3.5 - 5.3 mmol/L Final    Chloride 08/26/2024 103  98 - 107 mmol/L Final    Bicarbonate 08/26/2024 27  21 - 32 mmol/L Final    Anion Gap 08/26/2024 12  10 - 20 mmol/L Final    Urea Nitrogen 08/26/2024 17  6 - 23 mg/dL Final    Creatinine 08/26/2024 0.74  0.50 - 1.05 mg/dL Final    eGFR 08/26/2024 >90  >60 mL/min/1.73m*2 Final    Calculations of estimated GFR are performed using the 2021  CKD-EPI Study Refit equation without the race variable for the IDMS-Traceable creatinine methods.  https://jasn.asnjournals.org/content/early/2021/09/22/ASN.5071668923    Calcium 08/26/2024 9.5  8.6 - 10.3 mg/dL Final    Albumin 08/26/2024 4.2  3.4 - 5.0 g/dL Final    Alkaline Phosphatase 08/26/2024 90  33 - 110 U/L Final    Total Protein 08/26/2024 7.9  6.4 - 8.2 g/dL Final    AST 08/26/2024 19  9 - 39 U/L Final    Bilirubin, Total 08/26/2024 0.8  0.0 - 1.2 mg/dL Final    ALT 08/26/2024 8  7 - 45 U/L Final    Patients treated with Sulfasalazine may generate falsely decreased results for ALT.    WBC 08/26/2024 19.6 (H)  4.4 - 11.3 x10*3/uL Final    nRBC 08/26/2024 0.0  0.0 - 0.0 /100 WBCs Final    RBC 08/26/2024 5.55 (H)  4.00 - 5.20 x10*6/uL Final    Hemoglobin 08/26/2024 13.8  12.0 - 16.0 g/dL Final    Hematocrit 08/26/2024 44.2  36.0 - 46.0 % Final    MCV 08/26/2024 80  80 - 100 fL Final    MCH 08/26/2024 24.9 (L)  26.0 - 34.0 pg Final    MCHC 08/26/2024 31.2 (L)  32.0 - 36.0 g/dL Final    RDW 08/26/2024 21.4 (H)  11.5 - 14.5 % Final    Platelets 08/26/2024 1,609 (H)  150 - 450 x10*3/uL Final    Neutrophils % 08/26/2024 65.1  40.0 - 80.0 % Final    Immature Granulocytes %, Automated 08/26/2024 0.4  0.0 - 0.9 % Final    Immature Granulocyte Count (IG) includes promyelocytes, myelocytes and metamyelocytes but does not include bands. Percent differential counts (%) should be interpreted in the context of the absolute cell counts (cells/UL).    Lymphocytes % 08/26/2024 21.2  13.0 - 44.0 % Final    Monocytes % 08/26/2024 7.2  2.0 - 10.0 % Final    Eosinophils % 08/26/2024 4.9  0.0 - 6.0 % Final    Basophils % 08/26/2024 1.2  0.0 - 2.0 % Final    Neutrophils Absolute 08/26/2024 12.73 (H)  1.20 - 7.70 x10*3/uL Final    Percent differential counts (%) should be interpreted in the context of the absolute cell counts (cells/uL).    Immature Granulocytes Absolute, Au* 08/26/2024 0.08  0.00 - 0.70 x10*3/uL Final     Lymphocytes Absolute 08/26/2024 4.15  1.20 - 4.80 x10*3/uL Final    Monocytes Absolute 08/26/2024 1.40 (H)  0.10 - 1.00 x10*3/uL Final    Eosinophils Absolute 08/26/2024 0.96 (H)  0.00 - 0.70 x10*3/uL Final    Basophils Absolute 08/26/2024 0.24 (H)  0.00 - 0.10 x10*3/uL Final    Hemoglobin A1C 08/26/2024 6.0 (H)  see below % Final    Estimated Average Glucose 08/26/2024 126  Not Established mg/dL Final    HIV 1/2 Antigen/Antibody Screen wi* 08/26/2024 Nonreactive  Nonreactive Final    Hepatitis C AB 08/26/2024 Nonreactive  Nonreactive Final    Results from patients taking biotin supplements or receiving high-dose biotin therapy should be interpreted with caution due to possible interference with this test. Providers may contact their local laboratory for further information.    RBC Morphology 08/26/2024 See Below   Final    Polychromasia 08/26/2024 Mild   Final    RBC Fragments 08/26/2024 Few   Final    Ovalocytes 08/26/2024 Many   Final    [place] Cells 08/26/2024 Few   Final       Physical Exam  CONSTITUTIONAL - well nourished, well developed, looks like stated age, in no acute distress, not ill-appearing, and not tired appearing  SKIN - normal skin color and pigmentation, normal skin turgor without rash, lesions, or nodules visualized  HEAD - no trauma, normocephalic  NECK - supple without rigidity, no neck mass was observed, no thyromegaly or thyroid nodules  CHEST - clear to auscultation, no wheezing, no crackles and no rales, good effort  CARDIAC - Tachycardic with regular rhythm, no skipped beats, no murmur  EXTREMITIES - no edema, no deformities  NEUROLOGICAL - alert, oriented and no focal signs  PSYCHIATRIC - alert, pleasant and cordial, age-appropriate    Assessment/Plan   Diagnoses and all orders for this visit:  Hospital discharge follow-up  During today’s visit, we discussed your recent hospital admission and echocardiogram findings of heart failure with reduced ejection fraction and your current  management plan    You refused alternative treatments for your thrombocythemia at this time but should you ever change your mind, please let us know      -     CBC and Auto Differential; Future  -     Comprehensive metabolic panel; Future  -     ropeginterferon [id number] (Besremi) subcutaneous syringe 100 mcg  Essential (hemorrhagic) thrombocythemia (Multi)  Please continue to follow with Dr. Franklin and keep us informed on any future changes to your medical care    We administered a increased dose of the Besremi per Dr. Franklin's instructions  Please monitor for any acute changes and if you develop new or worsening bleeding, direct yourself to the nearest emergency department    -     CBC and Auto Differential; Future  -     Comprehensive metabolic panel; Future  -     ropeginterferon [id number] (Besremi) subcutaneous syringe 100 mcg  -     Transthoracic Echo (TTE) Complete; Future    Tachycardia  You voiced a preference to continue with losartan at a dose of 12.5 mg and deferred treatment with a beta-blocker at this time    It's important to acknowledge your concerns regarding starting metoprolol, and I respect your choice; but please note that the metoprolol will help decrease the burden being placed on your heart and preserve its overall functionality    We agreed to a plan that includes completing a confirmatory echocardiogram and starting additional medications with cardiology referral depending on the results    An order for a TTE has been placed     -     CBC and Auto Differential; Future  -     Comprehensive metabolic panel; Future  -     ropeginterferon [id number] (Besremi) subcutaneous syringe 100 mcg  -     Transthoracic Echo (TTE) Complete; Future    Multiple pulmonary emboli (Multi)  Continue taking your prescribed medications as directed    If you develop new or worsening hemoptysis, shortness of breath or chest pain, you will need to travel to the emergency department as soon as  possible    -     CBC and Auto Differential; Future  -     Comprehensive metabolic panel; Future  -     ropeginterferon [id number]-[id number] (Besremi) subcutaneous syringe 100 mcg  -     Transthoracic Echo (TTE) Complete; Future  Abnormal CBC  Before your discharge, you had an abnormal hemoglobin count that may have been secondary to the fluids you received    We would like to obtain updated blood work after today's visit to assess your current condition and will make recommendations as indicated    -     CBC and Auto Differential; Future  -     Comprehensive metabolic panel; Future  -     ropeginterferon [id number] (Besremi) subcutaneous syringe 100 mcg  -     Transthoracic Echo (TTE) Complete; Future

## 2024-09-24 ENCOUNTER — PATIENT OUTREACH (OUTPATIENT)
Dept: CARE COORDINATION | Facility: CLINIC | Age: 49
End: 2024-09-24
Payer: COMMERCIAL

## 2024-09-24 NOTE — RESULT ENCOUNTER NOTE
Complete blood cell count continues to show an elevated white blood cell count as well as a very elevated platelet cell count of 1754    Neutrophil count also elevated    Sugar, kidneys, liver, electrolytes are within normal limits    I would recommend she discusses this further with her hematology provider as it appears that her platelet cell count continues to remain elevated

## 2024-09-24 NOTE — PROGRESS NOTES
Outreach call to patient following up on appointment with primary care provider.  Patient with no additional questions at this time.  Will continue to follow.      Mya SWIFT, RN, [facility]  O: [phone]

## 2024-10-07 ENCOUNTER — APPOINTMENT (OUTPATIENT)
Dept: PRIMARY CARE | Facility: CLINIC | Age: 49
End: 2024-10-07
Payer: COMMERCIAL

## 2024-10-07 DIAGNOSIS — D47.3 ESSENTIAL (HEMORRHAGIC) THROMBOCYTHEMIA: Primary | ICD-10-CM

## 2024-10-07 PROCEDURE — 96372 THER/PROPH/DIAG INJ SC/IM: CPT | Performed by: STUDENT IN AN ORGANIZED HEALTH CARE EDUCATION/TRAINING PROGRAM

## 2024-10-07 NOTE — PROGRESS NOTES
Chronic history of pulmonary emboli  Injection was provided by you and was administered today by Cesilia Andrade MA

## 2024-10-10 ENCOUNTER — APPOINTMENT (OUTPATIENT)
Dept: GASTROENTEROLOGY | Facility: CLINIC | Age: 49
End: 2024-10-10
Payer: COMMERCIAL

## 2024-10-18 ENCOUNTER — PATIENT OUTREACH (OUTPATIENT)
Dept: CARE COORDINATION | Facility: CLINIC | Age: 49
End: 2024-10-18
Payer: COMMERCIAL

## 2024-10-18 NOTE — PROGRESS NOTES
Outreach call to patient to check in 30 days after hospital discharge to support smooth transition of care.  Patient with no additional needs noted. No additional outreach needed at this time.     Mya MORRISN, RN, [facility]  O: [phone]

## 2024-10-21 ENCOUNTER — APPOINTMENT (OUTPATIENT)
Dept: PRIMARY CARE | Facility: CLINIC | Age: 49
End: 2024-10-21
Payer: COMMERCIAL

## 2024-10-21 ENCOUNTER — LAB (OUTPATIENT)
Dept: LAB | Facility: LAB | Age: 49
End: 2024-10-21
Payer: COMMERCIAL

## 2024-10-21 VITALS
BODY MASS INDEX: 21.01 KG/M2 | DIASTOLIC BLOOD PRESSURE: 62 MMHG | HEART RATE: 140 BPM | SYSTOLIC BLOOD PRESSURE: 100 MMHG | WEIGHT: 122.4 LBS | OXYGEN SATURATION: 94 %

## 2024-10-21 DIAGNOSIS — R79.89 ABNORMAL CBC: ICD-10-CM

## 2024-10-21 DIAGNOSIS — I26.99 MULTIPLE PULMONARY EMBOLI (MULTI): ICD-10-CM

## 2024-10-21 DIAGNOSIS — D47.3 ESSENTIAL (HEMORRHAGIC) THROMBOCYTHEMIA: Primary | ICD-10-CM

## 2024-10-21 DIAGNOSIS — D47.3 ESSENTIAL (HEMORRHAGIC) THROMBOCYTHEMIA: ICD-10-CM

## 2024-10-21 DIAGNOSIS — R00.0 TACHYCARDIA: ICD-10-CM

## 2024-10-21 LAB
ACANTHOCYTES BLD QL SMEAR: NORMAL
ALBUMIN SERPL BCP-MCNC: 3.7 G/DL (ref 3.4–5)
ALP SERPL-CCNC: 109 U/L (ref 33–110)
ALT SERPL W P-5'-P-CCNC: 14 U/L (ref 7–45)
ANION GAP SERPL CALC-SCNC: 13 MMOL/L (ref 10–20)
AST SERPL W P-5'-P-CCNC: 23 U/L (ref 9–39)
BASOPHILS # BLD AUTO: 0.25 X10*3/UL (ref 0–0.1)
BASOPHILS NFR BLD AUTO: 1 %
BILIRUB SERPL-MCNC: 0.6 MG/DL (ref 0–1.2)
BUN SERPL-MCNC: 13 MG/DL (ref 6–23)
BURR CELLS BLD QL SMEAR: NORMAL
CALCIUM SERPL-MCNC: 9 MG/DL (ref 8.6–10.3)
CHLORIDE SERPL-SCNC: 101 MMOL/L (ref 98–107)
CO2 SERPL-SCNC: 26 MMOL/L (ref 21–32)
CREAT SERPL-MCNC: 0.79 MG/DL (ref 0.5–1.05)
EGFRCR SERPLBLD CKD-EPI 2021: >90 ML/MIN/1.73M*2
EOSINOPHIL # BLD AUTO: 1.45 X10*3/UL (ref 0–0.7)
EOSINOPHIL NFR BLD AUTO: 5.6 %
ERYTHROCYTE [DISTWIDTH] IN BLOOD BY AUTOMATED COUNT: 21.4 % (ref 11.5–14.5)
GLUCOSE SERPL-MCNC: 88 MG/DL (ref 74–99)
HCT VFR BLD AUTO: 41.3 % (ref 36–46)
HGB BLD-MCNC: 12.6 G/DL (ref 12–16)
IMM GRANULOCYTES # BLD AUTO: 0.18 X10*3/UL (ref 0–0.7)
IMM GRANULOCYTES NFR BLD AUTO: 0.7 % (ref 0–0.9)
LYMPHOCYTES # BLD AUTO: 3.61 X10*3/UL (ref 1.2–4.8)
LYMPHOCYTES NFR BLD AUTO: 13.9 %
MCH RBC QN AUTO: 24.9 PG (ref 26–34)
MCHC RBC AUTO-ENTMCNC: 30.5 G/DL (ref 32–36)
MCV RBC AUTO: 82 FL (ref 80–100)
MONOCYTES # BLD AUTO: 1.47 X10*3/UL (ref 0.1–1)
MONOCYTES NFR BLD AUTO: 5.6 %
NEUTROPHILS # BLD AUTO: 19.09 X10*3/UL (ref 1.2–7.7)
NEUTROPHILS NFR BLD AUTO: 73.2 %
NRBC BLD-RTO: 0 /100 WBCS (ref 0–0)
OVALOCYTES BLD QL SMEAR: NORMAL
PLATELET # BLD AUTO: 1580 X10*3/UL (ref 150–450)
POTASSIUM SERPL-SCNC: 3.7 MMOL/L (ref 3.5–5.3)
PROT SERPL-MCNC: 7.8 G/DL (ref 6.4–8.2)
RBC # BLD AUTO: 5.07 X10*6/UL (ref 4–5.2)
RBC MORPH BLD: NORMAL
SODIUM SERPL-SCNC: 136 MMOL/L (ref 136–145)
WBC # BLD AUTO: 26.1 X10*3/UL (ref 4.4–11.3)

## 2024-10-21 PROCEDURE — 80053 COMPREHEN METABOLIC PANEL: CPT

## 2024-10-21 PROCEDURE — 36415 COLL VENOUS BLD VENIPUNCTURE: CPT

## 2024-10-21 PROCEDURE — 85025 COMPLETE CBC W/AUTO DIFF WBC: CPT

## 2024-10-21 PROCEDURE — 96372 THER/PROPH/DIAG INJ SC/IM: CPT | Performed by: STUDENT IN AN ORGANIZED HEALTH CARE EDUCATION/TRAINING PROGRAM

## 2024-10-21 PROCEDURE — 99214 OFFICE O/P EST MOD 30 MIN: CPT | Performed by: STUDENT IN AN ORGANIZED HEALTH CARE EDUCATION/TRAINING PROGRAM

## 2024-10-21 PROCEDURE — 1036F TOBACCO NON-USER: CPT | Performed by: STUDENT IN AN ORGANIZED HEALTH CARE EDUCATION/TRAINING PROGRAM

## 2024-10-21 NOTE — PROGRESS NOTES
Subjective   Patient ID: Elise Chávez is a 49 y.o. female who presents for Follow-up.  Today she is accompanied by alone.     HPI    1. 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 Franklin, who initiated BESREMi injections (ropeginterferon [id number])    In the past she was even hospitalized in May down in [place] who recommended that she consider hydroxyurea    Continues to use Eliquis as indicated the chart as well but did even state that she was not taking the Eliquis over the summer due to a natural supplement    She is currently receiving ropeginterferon [id number] 100 mcg injections for her essential thrombocythemia and request to have this again provided today      After her recent hospitalization discharge instructions noted below  -Repeat ECHO to assess EF, unfortunately has yet to be done  -Follow up with Dr. Stevens to determine best medical therapy regimen for newly dx heart failure, has yet to be done  -New Medications: Losartan 12.5 mg and Metoprolol 25 mg twice/daily, Eliquis 5 mg every 12 hours  -Was told to stop taking Flexeril, Pancreatin, and the nattokinase      At her last visit it was noted she was not fully thrilled with her hospital experience    She did not think that the heart failure was still a true diagnosis  She has not been taking either the metoprolol or the losartan as she continues to follow-up with another provider who is providing many supplements to help her signs/symptoms    Otherwise, she continues to take the Eliquis  only here and there    Wishes to discuss this further at today's visit    Also noted that she has been having signs/symptoms of increased stress due to her mother recently be diagnosed again with cancer for the third time as well as having a colitis flare    Ultimately does not feel fully well but continues to follow-up with her hematologist down in [place]    Told myself that the medication she is on will take up to 4-6 months to fully start working    Otherwise, wishes to update myself about all of her signs/symptoms today        Current Outpatient Medications on File Prior to Visit   Medication Sig Dispense Refill    apixaban (Eliquis) 5 mg tablet Take 1 tablet (5 mg) by mouth every 12 hours. 180 tablet 0    losartan (Cozaar) 25 mg tablet Take 1 tablet (25 mg) by mouth once daily. 30 tablet 3    metoprolol tartrate (Lopressor) 25 mg tablet Take 1 tablet (25 mg) by mouth 2 times a day. DO NOT TAKE if blood pressure is less than 100 or heart rate is less than 55 30 tablet 3    ropeginterferon [id number] (Besremi) subcutaneous syringe Inject 0.2 mL (100 mcg) under the skin every 14 (fourteen) days.       No current facility-administered medications on file prior to visit.        No Known Allergies      There is no immunization history on file for this patient.      Review of Systems  All pertinent positive symptoms are included in the history of present illness.  All other systems have been reviewed and are negative and noncontributory to this patient's current ailments.     Objective   /62 (BP Location: Left arm, Patient Position: Sitting, BP Cuff Size: Adult)   Pulse (!) 140   Wt 55.5 kg (122 lb 6.4 oz)   LMP  (LMP Unknown)   SpO2 94%   BMI 21.01 kg/m²   BSA: 1.58 meters squared  Lab on 09/23/2024   Component Date Value Ref Range Status    WBC 09/23/2024 18.1 (H)  4.4 - 11.3 x10*3/uL Final    nRBC 09/23/2024 0.0  0.0 - 0.0 /100 WBCs Final    RBC 09/23/2024 5.11  4.00 - 5.20 x10*6/uL Final     Hemoglobin 09/23/2024 12.6  12.0 - 16.0 g/dL Final    Hematocrit 09/23/2024 41.2  36.0 - 46.0 % Final    MCV 09/23/2024 81  80 - 100 fL Final    MCH 09/23/2024 24.7 (L)  26.0 - 34.0 pg Final    MCHC 09/23/2024 30.6 (L)  32.0 - 36.0 g/dL Final    RDW 09/23/2024 22.6 (H)  11.5 - 14.5 % Final    Platelets 09/23/2024 1,754 (H)  150 - 450 x10*3/uL Final    Neutrophils % 09/23/2024 63.2  40.0 - 80.0 % Final    Immature Granulocytes %, Automated 09/23/2024 0.6  0.0 - 0.9 % Final    Immature Granulocyte Count (IG) includes promyelocytes, myelocytes and metamyelocytes but does not include bands. Percent differential counts (%) should be interpreted in the context of the absolute cell counts (cells/UL).    Lymphocytes % 09/23/2024 21.5  13.0 - 44.0 % Final    Monocytes % 09/23/2024 6.7  2.0 - 10.0 % Final    Eosinophils % 09/23/2024 6.7  0.0 - 6.0 % Final    Basophils % 09/23/2024 1.3  0.0 - 2.0 % Final    Neutrophils Absolute 09/23/2024 11.48 (H)  1.20 - 7.70 x10*3/uL Final    Percent differential counts (%) should be interpreted in the context of the absolute cell counts (cells/uL).    Immature Granulocytes Absolute, Au* 09/23/2024 0.10  0.00 - 0.70 x10*3/uL Final    Lymphocytes Absolute 09/23/2024 3.89  1.20 - 4.80 x10*3/uL Final    Monocytes Absolute 09/23/2024 1.21 (H)  0.10 - 1.00 x10*3/uL Final    Eosinophils Absolute 09/23/2024 1.22 (H)  0.00 - 0.70 x10*3/uL Final    Basophils Absolute 09/23/2024 0.23 (H)  0.00 - 0.10 x10*3/uL Final    Glucose 09/23/2024 90  74 - 99 mg/dL Final    Sodium 09/23/2024 139  136 - 145 mmol/L Final    Potassium 09/23/2024 3.5  3.5 - 5.3 mmol/L Final    Chloride 09/23/2024 104  98 - 107 mmol/L Final    Bicarbonate 09/23/2024 26  21 - 32 mmol/L Final    Anion Gap 09/23/2024 13  10 - 20 mmol/L Final    Urea Nitrogen 09/23/2024 18  6 - 23 mg/dL Final    Creatinine 09/23/2024 0.72  0.50 - 1.05 mg/dL Final    eGFR 09/23/2024 >90  >60 mL/min/1.73m*2 Final    Calculations of estimated GFR are  performed using the 2021 CKD-EPI Study Refit equation without the race variable for the IDMS-Traceable creatinine methods.  https://jasn.asnjournals.org/content/early/2021/09/22/ASN.8235259085    Calcium 09/23/2024 8.9  8.6 - 10.3 mg/dL Final    Albumin 09/23/2024 4.0  3.4 - 5.0 g/dL Final    Alkaline Phosphatase 09/23/2024 92  33 - 110 U/L Final    Total Protein 09/23/2024 8.0  6.4 - 8.2 g/dL Final    AST 09/23/2024 15  9 - 39 U/L Final    Bilirubin, Total 09/23/2024 0.6  0.0 - 1.2 mg/dL Final    ALT 09/23/2024 12  7 - 45 U/L Final    Patients treated with Sulfasalazine may generate falsely decreased results for ALT.    RBC Morphology 09/23/2024 See Below   Final    RBC Fragments 09/23/2024 Few   Final    Ovalocytes 09/23/2024 Few   Final    [place]idan Cells 09/23/2024 Many   Final    Acanthocytes 09/23/2024 Few   Final       Physical Exam  CONSTITUTIONAL -slightly pale looks like stated age, in no acute distress, appears fatigued  SKIN -slightly pale, normal skin turgor without rash, lesions, or nodules visualized  HEAD - no trauma, normocephalic  NECK - supple without rigidity, no neck mass was observed, no thyromegaly or thyroid nodules  CHEST - clear to auscultation, no wheezing, no crackles and no rales, good effort  CARDIAC - Tachycardic with regular rhythm, no skipped beats, no murmur  EXTREMITIES - no edema, no deformities  NEUROLOGICAL - alert, oriented and no focal signs  PSYCHIATRIC - alert, pleasant and cordial, age-appropriate    Assessment/Plan   We provided the Besremi injection today at 100 mcg    Along this I did order lab work to continue monitoring    We had another long conversation about your signs/symptoms and I did recommend you continue following up with your hematology provider    At any point if you notice worsening or acute signs/symptoms you need to notify me immediately and/or go to the nearest emergency room to be acutely evaluated    I did recommend you reconsider and try to get some of  these testings done including your echocardiogram at your earliest major convenience    I understand that you are taking all the supplements from an integrative provider but it may be wise that we look into other evidence-based medicine to continue going    I know you have refused other treatments in the past and I would recommend that you reconsider as I truly believe that your heart is too stressed at this point    Please contact the office if you have any further question/concerns as noted above

## 2024-10-22 NOTE — RESULT ENCOUNTER NOTE
Complete blood cell count shows an elevated white blood cell count at 26 as well as an elevated platelet cell count of 1580    Neutrophil count is elevated also at 19.09    I know the patient had a history of a slight cough and it may be wise to get a chest x-ray that was discussed briefly at yesterday's visit    Please let me know if she would like to do so    Sugar, kidneys, liver, electrolytes are all within normal limits

## 2024-10-23 DIAGNOSIS — K51.90 ULCERATIVE COLITIS WITHOUT COMPLICATIONS, UNSPECIFIED LOCATION (MULTI): Primary | ICD-10-CM

## 2024-10-23 DIAGNOSIS — R19.7 ACUTE DIARRHEA: Primary | ICD-10-CM

## 2024-10-23 DIAGNOSIS — T36.95XA ANTIBIOTIC-INDUCED YEAST INFECTION: ICD-10-CM

## 2024-10-23 DIAGNOSIS — B37.9 ANTIBIOTIC-INDUCED YEAST INFECTION: ICD-10-CM

## 2024-10-23 RX ORDER — FLUCONAZOLE 150 MG/1
150 TABLET ORAL ONCE
Qty: 1 TABLET | Refills: 0 | Status: SHIPPED | OUTPATIENT
Start: 2024-10-23 | End: 2024-10-23

## 2024-10-23 RX ORDER — AMOXICILLIN AND CLAVULANATE POTASSIUM 875; 125 MG/1; MG/1
875 TABLET, FILM COATED ORAL 2 TIMES DAILY
Qty: 14 TABLET | Refills: 0 | Status: SHIPPED | OUTPATIENT
Start: 2024-10-23

## 2024-10-23 RX ORDER — PREDNISONE 10 MG/1
TABLET ORAL
Qty: 30 TABLET | Refills: 0 | Status: SHIPPED | OUTPATIENT
Start: 2024-10-23

## 2024-10-25 ENCOUNTER — APPOINTMENT (OUTPATIENT)
Dept: GASTROENTEROLOGY | Facility: EXTERNAL LOCATION | Age: 49
End: 2024-10-25
Payer: COMMERCIAL

## 2024-10-31 ENCOUNTER — APPOINTMENT (OUTPATIENT)
Dept: CARDIOLOGY | Facility: HOSPITAL | Age: 49
End: 2024-10-31
Payer: COMMERCIAL

## 2024-10-31 ENCOUNTER — HOSPITAL ENCOUNTER (INPATIENT)
Facility: HOSPITAL | Age: 49
End: 2024-10-31
Attending: EMERGENCY MEDICINE | Admitting: INTERNAL MEDICINE
Payer: COMMERCIAL

## 2024-10-31 ENCOUNTER — APPOINTMENT (OUTPATIENT)
Dept: RADIOLOGY | Facility: HOSPITAL | Age: 49
End: 2024-10-31
Payer: COMMERCIAL

## 2024-10-31 DIAGNOSIS — I27.24 CTEPH (CHRONIC THROMBOEMBOLIC PULMONARY HYPERTENSION): ICD-10-CM

## 2024-10-31 DIAGNOSIS — K51.919 ULCERATIVE COLITIS WITH COMPLICATION, UNSPECIFIED LOCATION (MULTI): ICD-10-CM

## 2024-10-31 DIAGNOSIS — R00.0 TACHYCARDIA: ICD-10-CM

## 2024-10-31 DIAGNOSIS — I26.02 CHRONIC SADDLE PULMONARY EMBOLISM WITH ACUTE COR PULMONALE (MULTI): ICD-10-CM

## 2024-10-31 DIAGNOSIS — R19.7 DIARRHEA, UNSPECIFIED TYPE: Primary | ICD-10-CM

## 2024-10-31 DIAGNOSIS — I27.82 CHRONIC SADDLE PULMONARY EMBOLISM WITH ACUTE COR PULMONALE (MULTI): ICD-10-CM

## 2024-10-31 DIAGNOSIS — I26.99 MULTIPLE PULMONARY EMBOLI (MULTI): ICD-10-CM

## 2024-10-31 LAB
ALBUMIN SERPL BCP-MCNC: 3.6 G/DL (ref 3.4–5)
ALP SERPL-CCNC: 111 U/L (ref 33–110)
ALT SERPL W P-5'-P-CCNC: 9 U/L (ref 7–45)
ANION GAP SERPL CALC-SCNC: 12 MMOL/L (ref 10–20)
APTT PPP: 30 SECONDS (ref 27–38)
AST SERPL W P-5'-P-CCNC: 12 U/L (ref 9–39)
BASOPHILS # BLD AUTO: 0.22 X10*3/UL (ref 0–0.1)
BASOPHILS NFR BLD AUTO: 1 %
BILIRUB SERPL-MCNC: 0.5 MG/DL (ref 0–1.2)
BUN SERPL-MCNC: 13 MG/DL (ref 6–23)
CALCIUM SERPL-MCNC: 8.9 MG/DL (ref 8.6–10.3)
CHLORIDE SERPL-SCNC: 99 MMOL/L (ref 98–107)
CO2 SERPL-SCNC: 30 MMOL/L (ref 21–32)
CREAT SERPL-MCNC: 0.82 MG/DL (ref 0.5–1.05)
EGFRCR SERPLBLD CKD-EPI 2021: 89 ML/MIN/1.73M*2
EOSINOPHIL # BLD AUTO: 0.9 X10*3/UL (ref 0–0.7)
EOSINOPHIL NFR BLD AUTO: 4 %
ERYTHROCYTE [DISTWIDTH] IN BLOOD BY AUTOMATED COUNT: 19.7 % (ref 11.5–14.5)
GLUCOSE SERPL-MCNC: 103 MG/DL (ref 74–99)
HCT VFR BLD AUTO: 40 % (ref 36–46)
HEMOCCULT SP1 STL QL: NEGATIVE
HGB BLD-MCNC: 12.7 G/DL (ref 12–16)
IMM GRANULOCYTES # BLD AUTO: 0.13 X10*3/UL (ref 0–0.7)
IMM GRANULOCYTES NFR BLD AUTO: 0.6 % (ref 0–0.9)
INR PPP: 1.3 (ref 0.9–1.1)
LACTATE SERPL-SCNC: 1.2 MMOL/L (ref 0.4–2)
LIPASE SERPL-CCNC: 16 U/L (ref 9–82)
LYMPHOCYTES # BLD AUTO: 3.32 X10*3/UL (ref 1.2–4.8)
LYMPHOCYTES NFR BLD AUTO: 14.7 %
MCH RBC QN AUTO: 24.9 PG (ref 26–34)
MCHC RBC AUTO-ENTMCNC: 31.8 G/DL (ref 32–36)
MCV RBC AUTO: 78 FL (ref 80–100)
MONOCYTES # BLD AUTO: 1.53 X10*3/UL (ref 0.1–1)
MONOCYTES NFR BLD AUTO: 6.8 %
NEUTROPHILS # BLD AUTO: 16.49 X10*3/UL (ref 1.2–7.7)
NEUTROPHILS NFR BLD AUTO: 72.9 %
NRBC BLD-RTO: 0 /100 WBCS (ref 0–0)
PLATELET # BLD AUTO: 1477 X10*3/UL (ref 150–450)
POTASSIUM SERPL-SCNC: 3.8 MMOL/L (ref 3.5–5.3)
PROT SERPL-MCNC: 8.1 G/DL (ref 6.4–8.2)
PROTHROMBIN TIME: 14.1 SECONDS (ref 9.8–12.8)
RBC # BLD AUTO: 5.11 X10*6/UL (ref 4–5.2)
SODIUM SERPL-SCNC: 137 MMOL/L (ref 136–145)
WBC # BLD AUTO: 22.6 X10*3/UL (ref 4.4–11.3)

## 2024-10-31 PROCEDURE — 85025 COMPLETE CBC W/AUTO DIFF WBC: CPT

## 2024-10-31 PROCEDURE — 74177 CT ABD & PELVIS W/CONTRAST: CPT | Performed by: STUDENT IN AN ORGANIZED HEALTH CARE EDUCATION/TRAINING PROGRAM

## 2024-10-31 PROCEDURE — 2550000001 HC RX 255 CONTRASTS: Performed by: EMERGENCY MEDICINE

## 2024-10-31 PROCEDURE — 71275 CT ANGIOGRAPHY CHEST: CPT | Performed by: STUDENT IN AN ORGANIZED HEALTH CARE EDUCATION/TRAINING PROGRAM

## 2024-10-31 PROCEDURE — 96365 THER/PROPH/DIAG IV INF INIT: CPT

## 2024-10-31 PROCEDURE — 86140 C-REACTIVE PROTEIN: CPT

## 2024-10-31 PROCEDURE — 84484 ASSAY OF TROPONIN QUANT: CPT | Performed by: STUDENT IN AN ORGANIZED HEALTH CARE EDUCATION/TRAINING PROGRAM

## 2024-10-31 PROCEDURE — 82270 OCCULT BLOOD FECES: CPT

## 2024-10-31 PROCEDURE — 96375 TX/PRO/DX INJ NEW DRUG ADDON: CPT

## 2024-10-31 PROCEDURE — 87493 C DIFF AMPLIFIED PROBE: CPT | Mod: AHULAB

## 2024-10-31 PROCEDURE — 83605 ASSAY OF LACTIC ACID: CPT

## 2024-10-31 PROCEDURE — 96361 HYDRATE IV INFUSION ADD-ON: CPT

## 2024-10-31 PROCEDURE — 2500000004 HC RX 250 GENERAL PHARMACY W/ HCPCS (ALT 636 FOR OP/ED): Performed by: EMERGENCY MEDICINE

## 2024-10-31 PROCEDURE — 85610 PROTHROMBIN TIME: CPT

## 2024-10-31 PROCEDURE — 93005 ELECTROCARDIOGRAM TRACING: CPT

## 2024-10-31 PROCEDURE — 74177 CT ABD & PELVIS W/CONTRAST: CPT

## 2024-10-31 PROCEDURE — 36415 COLL VENOUS BLD VENIPUNCTURE: CPT

## 2024-10-31 PROCEDURE — 99285 EMERGENCY DEPT VISIT HI MDM: CPT

## 2024-10-31 PROCEDURE — 71275 CT ANGIOGRAPHY CHEST: CPT

## 2024-10-31 PROCEDURE — 80053 COMPREHEN METABOLIC PANEL: CPT

## 2024-10-31 PROCEDURE — 83690 ASSAY OF LIPASE: CPT

## 2024-10-31 PROCEDURE — 87506 IADNA-DNA/RNA PROBE TQ 6-11: CPT | Mod: AHULAB

## 2024-10-31 RX ORDER — ONDANSETRON HYDROCHLORIDE 2 MG/ML
4 INJECTION, SOLUTION INTRAVENOUS ONCE
Status: COMPLETED | OUTPATIENT
Start: 2024-10-31 | End: 2024-10-31

## 2024-10-31 ASSESSMENT — PAIN SCALES - GENERAL: PAINLEVEL_OUTOF10: 0 - NO PAIN

## 2024-10-31 ASSESSMENT — PAIN - FUNCTIONAL ASSESSMENT: PAIN_FUNCTIONAL_ASSESSMENT: 0-10

## 2024-11-01 ENCOUNTER — APPOINTMENT (OUTPATIENT)
Dept: VASCULAR MEDICINE | Facility: HOSPITAL | Age: 49
End: 2024-11-01
Payer: COMMERCIAL

## 2024-11-01 ENCOUNTER — APPOINTMENT (OUTPATIENT)
Dept: CARDIOLOGY | Facility: HOSPITAL | Age: 49
End: 2024-11-01
Payer: COMMERCIAL

## 2024-11-01 PROBLEM — R19.7 DIARRHEA, UNSPECIFIED TYPE: Status: ACTIVE | Noted: 2024-11-01

## 2024-11-01 LAB
ABO GROUP (TYPE) IN BLOOD: NORMAL
ANION GAP SERPL CALC-SCNC: 12 MMOL/L (ref 10–20)
ANTIBODY SCREEN: NORMAL
AORTIC VALVE MEAN GRADIENT: 3 MMHG
AORTIC VALVE PEAK VELOCITY: 1.15 M/S
APPEARANCE UR: CLEAR
ATRIAL RATE: 123 BPM
AV PEAK GRADIENT: 5 MMHG
AVA (PEAK VEL): 2.35 CM2
AVA (VTI): 2.49 CM2
BILIRUB UR STRIP.AUTO-MCNC: NEGATIVE MG/DL
BNP SERPL-MCNC: 130 PG/ML (ref 0–99)
BUN SERPL-MCNC: 10 MG/DL (ref 6–23)
C COLI+JEJ+UPSA DNA STL QL NAA+PROBE: NOT DETECTED
C DIF TOX TCDA+TCDB STL QL NAA+PROBE: NOT DETECTED
CALCIUM SERPL-MCNC: 8.1 MG/DL (ref 8.6–10.3)
CARDIAC TROPONIN I PNL SERPL HS: 14 NG/L (ref 0–13)
CARDIAC TROPONIN I PNL SERPL HS: 9 NG/L (ref 0–13)
CHLORIDE SERPL-SCNC: 104 MMOL/L (ref 98–107)
CO2 SERPL-SCNC: 24 MMOL/L (ref 21–32)
COLOR UR: YELLOW
CREAT SERPL-MCNC: 0.63 MG/DL (ref 0.5–1.05)
CRP SERPL-MCNC: 8.08 MG/DL
EC STX1 GENE STL QL NAA+PROBE: NOT DETECTED
EC STX2 GENE STL QL NAA+PROBE: NOT DETECTED
EGFRCR SERPLBLD CKD-EPI 2021: >90 ML/MIN/1.73M*2
EJECTION FRACTION APICAL 4 CHAMBER: 74.1
EJECTION FRACTION: 68 %
ERYTHROCYTE [DISTWIDTH] IN BLOOD BY AUTOMATED COUNT: 20.9 % (ref 11.5–14.5)
ERYTHROCYTE [SEDIMENTATION RATE] IN BLOOD BY WESTERGREN METHOD: 34 MM/H (ref 0–20)
GLUCOSE SERPL-MCNC: 107 MG/DL (ref 74–99)
GLUCOSE UR STRIP.AUTO-MCNC: NORMAL MG/DL
HCT VFR BLD AUTO: 39.5 % (ref 36–46)
HGB BLD-MCNC: 12.4 G/DL (ref 12–16)
HOLD SPECIMEN: NORMAL
HOLD SPECIMEN: NORMAL
KETONES UR STRIP.AUTO-MCNC: ABNORMAL MG/DL
LEFT ATRIUM VOLUME AREA LENGTH INDEX BSA: 23.4 ML/M2
LEFT VENTRICLE INTERNAL DIMENSION DIASTOLE: 4 CM (ref 3.5–6)
LEFT VENTRICULAR OUTFLOW TRACT DIAMETER: 1.99 CM
LEUKOCYTE ESTERASE UR QL STRIP.AUTO: NEGATIVE
MCH RBC QN AUTO: 24.7 PG (ref 26–34)
MCHC RBC AUTO-ENTMCNC: 31.4 G/DL (ref 32–36)
MCV RBC AUTO: 79 FL (ref 80–100)
MITRAL VALVE E/A RATIO: 0.97
MUCOUS THREADS #/AREA URNS AUTO: NORMAL /LPF
NITRITE UR QL STRIP.AUTO: NEGATIVE
NOROVIRUS GI + GII RNA STL NAA+PROBE: NOT DETECTED
NRBC BLD-RTO: 0 /100 WBCS (ref 0–0)
P AXIS: 65 DEGREES
P OFFSET: 193 MS
P ONSET: 139 MS
PH UR STRIP.AUTO: 6 [PH]
PLATELET # BLD AUTO: 1209 X10*3/UL (ref 150–450)
POTASSIUM SERPL-SCNC: 3.8 MMOL/L (ref 3.5–5.3)
PR INTERVAL: 142 MS
PROT UR STRIP.AUTO-MCNC: ABNORMAL MG/DL
Q ONSET: 210 MS
QRS COUNT: 20 BEATS
QRS DURATION: 86 MS
QT INTERVAL: 336 MS
QTC CALCULATION(BAZETT): 481 MS
QTC FREDERICIA: 426 MS
R AXIS: 148 DEGREES
RBC # BLD AUTO: 5.03 X10*6/UL (ref 4–5.2)
RBC # UR STRIP.AUTO: NEGATIVE /UL
RBC #/AREA URNS AUTO: NORMAL /HPF
RH FACTOR (ANTIGEN D): NORMAL
RIGHT VENTRICLE FREE WALL PEAK S': 18 CM/S
RIGHT VENTRICLE PEAK SYSTOLIC PRESSURE: 101.8 MMHG
RV RNA STL NAA+PROBE: NOT DETECTED
SALMONELLA DNA STL QL NAA+PROBE: NOT DETECTED
SHIGELLA DNA SPEC QL NAA+PROBE: NOT DETECTED
SODIUM SERPL-SCNC: 136 MMOL/L (ref 136–145)
SP GR UR STRIP.AUTO: >1.05
SQUAMOUS #/AREA URNS AUTO: NORMAL /HPF
T AXIS: 80 DEGREES
T OFFSET: 378 MS
TRICUSPID ANNULAR PLANE SYSTOLIC EXCURSION: 1.5 CM
UFH PPP CHRO-ACNC: 0.2 IU/ML
UFH PPP CHRO-ACNC: 0.3 IU/ML
UFH PPP CHRO-ACNC: <0.1 IU/ML
UROBILINOGEN UR STRIP.AUTO-MCNC: NORMAL MG/DL
V CHOLERAE DNA STL QL NAA+PROBE: NOT DETECTED
VENTRICULAR RATE: 123 BPM
WBC # BLD AUTO: 24.7 X10*3/UL (ref 4.4–11.3)
WBC #/AREA URNS AUTO: NORMAL /HPF
Y ENTEROCOL DNA STL QL NAA+PROBE: NOT DETECTED

## 2024-11-01 PROCEDURE — 85520 HEPARIN ASSAY: CPT | Performed by: PHARMACIST

## 2024-11-01 PROCEDURE — 85027 COMPLETE CBC AUTOMATED: CPT | Performed by: INTERNAL MEDICINE

## 2024-11-01 PROCEDURE — 99223 1ST HOSP IP/OBS HIGH 75: CPT | Performed by: STUDENT IN AN ORGANIZED HEALTH CARE EDUCATION/TRAINING PROGRAM

## 2024-11-01 PROCEDURE — 36415 COLL VENOUS BLD VENIPUNCTURE: CPT | Performed by: PHARMACIST

## 2024-11-01 PROCEDURE — 99223 1ST HOSP IP/OBS HIGH 75: CPT | Performed by: NURSE PRACTITIONER

## 2024-11-01 PROCEDURE — 86901 BLOOD TYPING SEROLOGIC RH(D): CPT | Performed by: EMERGENCY MEDICINE

## 2024-11-01 PROCEDURE — 2500000004 HC RX 250 GENERAL PHARMACY W/ HCPCS (ALT 636 FOR OP/ED): Performed by: EMERGENCY MEDICINE

## 2024-11-01 PROCEDURE — 99223 1ST HOSP IP/OBS HIGH 75: CPT | Performed by: INTERNAL MEDICINE

## 2024-11-01 PROCEDURE — 93306 TTE W/DOPPLER COMPLETE: CPT

## 2024-11-01 PROCEDURE — 99221 1ST HOSP IP/OBS SF/LOW 40: CPT

## 2024-11-01 PROCEDURE — 2500000004 HC RX 250 GENERAL PHARMACY W/ HCPCS (ALT 636 FOR OP/ED): Performed by: NURSE PRACTITIONER

## 2024-11-01 PROCEDURE — 87040 BLOOD CULTURE FOR BACTERIA: CPT | Mod: AHULAB | Performed by: EMERGENCY MEDICINE

## 2024-11-01 PROCEDURE — 84484 ASSAY OF TROPONIN QUANT: CPT | Performed by: STUDENT IN AN ORGANIZED HEALTH CARE EDUCATION/TRAINING PROGRAM

## 2024-11-01 PROCEDURE — 1200000002 HC GENERAL ROOM WITH TELEMETRY DAILY

## 2024-11-01 PROCEDURE — 85520 HEPARIN ASSAY: CPT | Performed by: INTERNAL MEDICINE

## 2024-11-01 PROCEDURE — 85652 RBC SED RATE AUTOMATED: CPT

## 2024-11-01 PROCEDURE — 36415 COLL VENOUS BLD VENIPUNCTURE: CPT | Performed by: EMERGENCY MEDICINE

## 2024-11-01 PROCEDURE — 87075 CULTR BACTERIA EXCEPT BLOOD: CPT | Mod: AHULAB | Performed by: EMERGENCY MEDICINE

## 2024-11-01 PROCEDURE — 81003 URINALYSIS AUTO W/O SCOPE: CPT

## 2024-11-01 PROCEDURE — 2500000004 HC RX 250 GENERAL PHARMACY W/ HCPCS (ALT 636 FOR OP/ED): Performed by: INTERNAL MEDICINE

## 2024-11-01 PROCEDURE — 83993 ASSAY FOR CALPROTECTIN FECAL: CPT

## 2024-11-01 PROCEDURE — 99232 SBSQ HOSP IP/OBS MODERATE 35: CPT | Performed by: NURSE PRACTITIONER

## 2024-11-01 PROCEDURE — 83880 ASSAY OF NATRIURETIC PEPTIDE: CPT | Performed by: STUDENT IN AN ORGANIZED HEALTH CARE EDUCATION/TRAINING PROGRAM

## 2024-11-01 PROCEDURE — 80051 ELECTROLYTE PANEL: CPT | Performed by: INTERNAL MEDICINE

## 2024-11-01 RX ORDER — PREDNISONE 20 MG/1
40 TABLET ORAL DAILY
Status: DISCONTINUED | OUTPATIENT
Start: 2024-11-02 | End: 2024-11-02

## 2024-11-01 RX ORDER — ONDANSETRON 4 MG/1
4 TABLET, FILM COATED ORAL EVERY 8 HOURS PRN
Status: DISCONTINUED | OUTPATIENT
Start: 2024-11-01 | End: 2024-11-05 | Stop reason: HOSPADM

## 2024-11-01 RX ORDER — HEPARIN SODIUM 10000 [USP'U]/100ML
0-4500 INJECTION, SOLUTION INTRAVENOUS CONTINUOUS
Status: DISCONTINUED | OUTPATIENT
Start: 2024-11-01 | End: 2024-11-04

## 2024-11-01 RX ORDER — GUAIFENESIN 600 MG/1
600 TABLET, EXTENDED RELEASE ORAL EVERY 12 HOURS PRN
Status: DISCONTINUED | OUTPATIENT
Start: 2024-11-01 | End: 2024-11-05 | Stop reason: HOSPADM

## 2024-11-01 RX ORDER — LOSARTAN POTASSIUM 25 MG/1
25 TABLET ORAL DAILY
COMMUNITY
End: 2024-11-07 | Stop reason: WASHOUT

## 2024-11-01 RX ORDER — TALC
3 POWDER (GRAM) TOPICAL NIGHTLY PRN
Status: DISCONTINUED | OUTPATIENT
Start: 2024-11-01 | End: 2024-11-05 | Stop reason: HOSPADM

## 2024-11-01 RX ORDER — ACETAMINOPHEN 650 MG/1
650 SUPPOSITORY RECTAL EVERY 4 HOURS PRN
Status: DISCONTINUED | OUTPATIENT
Start: 2024-11-01 | End: 2024-11-05 | Stop reason: HOSPADM

## 2024-11-01 RX ORDER — HEPARIN SODIUM 10000 [USP'U]/100ML
0-4500 INJECTION, SOLUTION INTRAVENOUS CONTINUOUS
Status: DISCONTINUED | OUTPATIENT
Start: 2024-11-01 | End: 2024-11-01 | Stop reason: SDUPTHER

## 2024-11-01 RX ORDER — CIPROFLOXACIN 2 MG/ML
400 INJECTION, SOLUTION INTRAVENOUS EVERY 12 HOURS
Status: DISCONTINUED | OUTPATIENT
Start: 2024-11-01 | End: 2024-11-04

## 2024-11-01 RX ORDER — PANTOPRAZOLE SODIUM 40 MG/10ML
40 INJECTION, POWDER, LYOPHILIZED, FOR SOLUTION INTRAVENOUS
Status: DISCONTINUED | OUTPATIENT
Start: 2024-11-01 | End: 2024-11-05 | Stop reason: HOSPADM

## 2024-11-01 RX ORDER — ONDANSETRON HYDROCHLORIDE 2 MG/ML
4 INJECTION, SOLUTION INTRAVENOUS EVERY 8 HOURS PRN
Status: DISCONTINUED | OUTPATIENT
Start: 2024-11-01 | End: 2024-11-05 | Stop reason: HOSPADM

## 2024-11-01 RX ORDER — POLYETHYLENE GLYCOL 3350 17 G/17G
17 POWDER, FOR SOLUTION ORAL DAILY PRN
Status: DISCONTINUED | OUTPATIENT
Start: 2024-11-01 | End: 2024-11-05 | Stop reason: HOSPADM

## 2024-11-01 RX ORDER — PANTOPRAZOLE SODIUM 40 MG/1
40 TABLET, DELAYED RELEASE ORAL
Status: DISCONTINUED | OUTPATIENT
Start: 2024-11-01 | End: 2024-11-05 | Stop reason: HOSPADM

## 2024-11-01 RX ORDER — METRONIDAZOLE 500 MG/100ML
500 INJECTION, SOLUTION INTRAVENOUS EVERY 8 HOURS
Status: DISCONTINUED | OUTPATIENT
Start: 2024-11-01 | End: 2024-11-05 | Stop reason: HOSPADM

## 2024-11-01 RX ORDER — ACETAMINOPHEN 325 MG/1
650 TABLET ORAL EVERY 4 HOURS PRN
Status: DISCONTINUED | OUTPATIENT
Start: 2024-11-01 | End: 2024-11-05 | Stop reason: HOSPADM

## 2024-11-01 RX ORDER — ACETAMINOPHEN 160 MG/5ML
650 SOLUTION ORAL EVERY 4 HOURS PRN
Status: DISCONTINUED | OUTPATIENT
Start: 2024-11-01 | End: 2024-11-05 | Stop reason: HOSPADM

## 2024-11-01 RX ORDER — PREDNISONE 20 MG/1
40 TABLET ORAL DAILY
Status: DISCONTINUED | OUTPATIENT
Start: 2024-11-01 | End: 2024-11-01

## 2024-11-01 SDOH — ECONOMIC STABILITY: INCOME INSECURITY: IN THE PAST 12 MONTHS HAS THE ELECTRIC, GAS, OIL, OR WATER COMPANY THREATENED TO SHUT OFF SERVICES IN YOUR HOME?: NO

## 2024-11-01 SDOH — SOCIAL STABILITY: SOCIAL INSECURITY: DOES ANYONE TRY TO KEEP YOU FROM HAVING/CONTACTING OTHER FRIENDS OR DOING THINGS OUTSIDE YOUR HOME?: NO

## 2024-11-01 SDOH — SOCIAL STABILITY: SOCIAL INSECURITY: HAS ANYONE EVER THREATENED TO HURT YOUR FAMILY OR YOUR PETS?: NO

## 2024-11-01 SDOH — SOCIAL STABILITY: SOCIAL INSECURITY: WITHIN THE LAST YEAR, HAVE YOU BEEN AFRAID OF YOUR PARTNER OR EX-PARTNER?: NO

## 2024-11-01 SDOH — SOCIAL STABILITY: SOCIAL INSECURITY
WITHIN THE LAST YEAR, HAVE YOU BEEN KICKED, HIT, SLAPPED, OR OTHERWISE PHYSICALLY HURT BY YOUR PARTNER OR EX-PARTNER?: NO

## 2024-11-01 SDOH — SOCIAL STABILITY: SOCIAL INSECURITY: HAVE YOU HAD ANY THOUGHTS OF HARMING ANYONE ELSE?: NO

## 2024-11-01 SDOH — SOCIAL STABILITY: SOCIAL INSECURITY: ABUSE: ADULT

## 2024-11-01 SDOH — SOCIAL STABILITY: SOCIAL INSECURITY: DO YOU FEEL ANYONE HAS EXPLOITED OR TAKEN ADVANTAGE OF YOU FINANCIALLY OR OF YOUR PERSONAL PROPERTY?: NO

## 2024-11-01 SDOH — SOCIAL STABILITY: SOCIAL INSECURITY: WITHIN THE LAST YEAR, HAVE YOU BEEN HUMILIATED OR EMOTIONALLY ABUSED IN OTHER WAYS BY YOUR PARTNER OR EX-PARTNER?: NO

## 2024-11-01 SDOH — SOCIAL STABILITY: SOCIAL INSECURITY: DO YOU FEEL UNSAFE GOING BACK TO THE PLACE WHERE YOU ARE LIVING?: NO

## 2024-11-01 SDOH — ECONOMIC STABILITY: FOOD INSECURITY: WITHIN THE PAST 12 MONTHS, YOU WORRIED THAT YOUR FOOD WOULD RUN OUT BEFORE YOU GOT THE MONEY TO BUY MORE.: NEVER TRUE

## 2024-11-01 SDOH — SOCIAL STABILITY: SOCIAL INSECURITY
WITHIN THE LAST YEAR, HAVE YOU BEEN RAPED OR FORCED TO HAVE ANY KIND OF SEXUAL ACTIVITY BY YOUR PARTNER OR EX-PARTNER?: NO

## 2024-11-01 SDOH — SOCIAL STABILITY: SOCIAL INSECURITY: ARE YOU OR HAVE YOU BEEN THREATENED OR ABUSED PHYSICALLY, EMOTIONALLY, OR SEXUALLY BY ANYONE?: NO

## 2024-11-01 SDOH — ECONOMIC STABILITY: FOOD INSECURITY: WITHIN THE PAST 12 MONTHS, THE FOOD YOU BOUGHT JUST DIDN'T LAST AND YOU DIDN'T HAVE MONEY TO GET MORE.: NEVER TRUE

## 2024-11-01 SDOH — SOCIAL STABILITY: SOCIAL INSECURITY: HAVE YOU HAD THOUGHTS OF HARMING ANYONE ELSE?: NO

## 2024-11-01 SDOH — SOCIAL STABILITY: SOCIAL INSECURITY: ARE THERE ANY APPARENT SIGNS OF INJURIES/BEHAVIORS THAT COULD BE RELATED TO ABUSE/NEGLECT?: NO

## 2024-11-01 ASSESSMENT — ENCOUNTER SYMPTOMS
CARDIOVASCULAR NEGATIVE: 1
WHEEZING: 0
NEUROLOGICAL NEGATIVE: 1
BLOOD IN STOOL: 0
ABDOMINAL PAIN: 1
CHILLS: 0
VOMITING: 1
SORE THROAT: 0
BLOOD IN STOOL: 1
PALPITATIONS: 1
DYSURIA: 0
ALLERGIC/IMMUNOLOGIC NEGATIVE: 1
PSYCHIATRIC NEGATIVE: 1
LIGHT-HEADEDNESS: 0
HEMATOLOGIC/LYMPHATIC NEGATIVE: 1
ARTHRALGIAS: 0
DIARRHEA: 1
HEMATURIA: 0
FATIGUE: 1
NAUSEA: 1
ACTIVITY CHANGE: 1
MYALGIAS: 0
COUGH: 0
MUSCULOSKELETAL NEGATIVE: 1
EYES NEGATIVE: 1
SHORTNESS OF BREATH: 1
FEVER: 0
APPETITE CHANGE: 1
CHILLS: 1
RESPIRATORY NEGATIVE: 1
ENDOCRINE NEGATIVE: 1
CONFUSION: 0

## 2024-11-01 ASSESSMENT — COGNITIVE AND FUNCTIONAL STATUS - GENERAL
DAILY ACTIVITIY SCORE: 24
PATIENT BASELINE BEDBOUND: NO
DAILY ACTIVITIY SCORE: 24
MOBILITY SCORE: 24
DAILY ACTIVITIY SCORE: 24
MOBILITY SCORE: 24
MOBILITY SCORE: 24

## 2024-11-01 ASSESSMENT — ACTIVITIES OF DAILY LIVING (ADL)
BATHING: INDEPENDENT
GROOMING: INDEPENDENT
FEEDING YOURSELF: INDEPENDENT
DRESSING YOURSELF: INDEPENDENT
ADEQUATE_TO_COMPLETE_ADL: YES
LACK_OF_TRANSPORTATION: NO
LACK_OF_TRANSPORTATION: NO
HEARING - RIGHT EAR: FUNCTIONAL
HEARING - LEFT EAR: FUNCTIONAL
JUDGMENT_ADEQUATE_SAFELY_COMPLETE_DAILY_ACTIVITIES: YES
TOILETING: INDEPENDENT
PATIENT'S MEMORY ADEQUATE TO SAFELY COMPLETE DAILY ACTIVITIES?: YES
WALKS IN HOME: INDEPENDENT

## 2024-11-01 ASSESSMENT — LIFESTYLE VARIABLES
SKIP TO QUESTIONS 9-10: 1
HOW MANY STANDARD DRINKS CONTAINING ALCOHOL DO YOU HAVE ON A TYPICAL DAY: 1 OR 2
AUDIT-C TOTAL SCORE: 1
AUDIT-C TOTAL SCORE: 1
HOW OFTEN DO YOU HAVE 6 OR MORE DRINKS ON ONE OCCASION: NEVER
HOW OFTEN DO YOU HAVE A DRINK CONTAINING ALCOHOL: MONTHLY OR LESS

## 2024-11-01 ASSESSMENT — PATIENT HEALTH QUESTIONNAIRE - PHQ9
2. FEELING DOWN, DEPRESSED OR HOPELESS: NOT AT ALL
1. LITTLE INTEREST OR PLEASURE IN DOING THINGS: NOT AT ALL
SUM OF ALL RESPONSES TO PHQ9 QUESTIONS 1 & 2: 0

## 2024-11-01 ASSESSMENT — PAIN SCALES - GENERAL
PAINLEVEL_OUTOF10: 0 - NO PAIN

## 2024-11-01 ASSESSMENT — PAIN - FUNCTIONAL ASSESSMENT: PAIN_FUNCTIONAL_ASSESSMENT: 0-10

## 2024-11-01 NOTE — CONSULTS
Reason For Consult  [facility]    History Of Present Illness  Elise Chávez is a 49 y.o. 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  Symptoms started beginning October.  Denies fevers but endorses chills.  Denies dysphagia, odynophagia, hematemesis, coffee-ground emesis, melena, hematuria, marijuana, smoking, street drugs.  Endorses occasional EtOH and NSAIDs use.  She endorses some mucus in her stool and small amounts of blood after wiping.  Per patient, her father had colon cancer and mother has colitis.  No recent antibiotics.  Attributes her abdominal symptoms to increase in the dose of Besremi (from 50 to 100 mcg every 2 weeks) which she takes for thrombocytopenia.   No recent endoscopies.    ER labs notable for CRP 8.08, INR 1.3, lipase 16, WBC 22.6 with left shift, platelets 1477.  Infectious stool workup negative and occult blood stool negative.  Last colonoscopy 12/23/2013 for rectal bleeding:  --Scattered moderate inflammation was found in the entire examined colon secondary to pancolitis. This was biopsied.  -- The examination was otherwise normal.  -- Multiple biopsies were obtained cecum, right colon, transverse colon, descending colon, sigmoid, rectum.  -- Colitis suspected.  Biopsies of the entire colon were consistent with colonic mucosa with chronic active colitis with active inflammation consisting of cryptitis and crypt abscesses, reactive epithelial changes, chronic inflammatory changes consisted of crypt distortion but no dysplasia or granulomas were identified.  Per patient, her colitis was mild and she was not following any outpatient GI.  [facility] Progress Note            Chief complaint:   Patient Active Problem List   Diagnosis    Sciatica    Sacroiliitis ([address]    Protruded lumbar disc    DDD (degenerative disc disease), lumbar    Neural foraminal stenosis of lumbar spine    Lumbar radiculopathy    IBS (irritable bowel syndrome)    Morbid obesity ([address]    Gastroesophageal reflux disease without esophagitis    Type 2 diabetes mellitus without complication, without long-term current use of insulin ([address]    Pure hypercholesterolemia    Symptomatic cholelithiasis       S: doing well    O:   Vitals:    08/04/21 1325   BP: 120/84   Pulse: 88   Resp: 18   Temp: 97.2 °F (36.2 °C)   SpO2: 98%     No intake or output data in the 24 hours ending 08/04/21 1338        Labs:  No results for input(s): WBC, HGB, HCT in the last 72 hours. Invalid input(s): PLR  Lab Results   Component Value Date    CREATININE 0.6 07/20/2021    BUN 7 07/20/2021     07/20/2021    K 4.1 07/20/2021     07/20/2021    CO2 23 07/20/2021     No results for input(s): LIPASE, AMYLASE in the last 72 hours. Physical exam:   /84   Pulse 88   Temp 97.2 °F (36.2 °C) (Temporal)   Resp 18   Ht 5' 3\" (1.6 m)   Wt 243 lb (110.2 kg)   SpO2 98%   BMI 43.05 kg/m²   General appearance: NAD  Head: NCAT  Neck: supple, no masses  Lungs: equal chest rise bilateral  Heart: S1S2 present  Abdomen: soft, nontender, nondistended  Skin; no new lesions, incisions clean and intact  Gu: no cva tenderness  Extremities: extremities normal, atraumatic, no cyanosis or edema    A:  Post op lap antolin    P: follow up as needed.      Lenny Narayanan MD, MD  8/4/2021 of chiropractic clinic in [place].  She states that she was able to manage her ulcerative colitis with natural supplements and acupuncture.   CT abdomen and pelvis with IV contrast showed diffuse active inflammation of the colon contiguously extending from the rectum to the ascending colon which is significantly progressed in the transverse colon, again demonstrating characteristic featureless appearance consistent with known ulcerative colitis.   GI was consulted for management of ulcerative colitis.    Past Medical History  She has a past medical history of Encounter for follow-up examination after completed treatment for conditions other than malignant neoplasm (10/25/2021), Encounter for surveillance of contraceptives, unspecified (08/11/2020), Melena, Other conditions influencing health status, Personal history of other endocrine, nutritional and metabolic disease, Personal history of other specified conditions (12/02/2013), Personal history of other specified conditions (10/16/2020), Personal history of other specified conditions, and Personal history of urinary calculi (12/02/2013).    Surgical History  She has a past surgical history that includes Refractive surgery (06/11/2013); Other surgical history (06/11/2013); and Lithotripsy (12/10/2013).     Social History  She reports that she has never smoked. She has never used smokeless tobacco. No history on file for alcohol use and drug use.    Family History  No family history on file.     Allergies  Patient has no known allergies.    Review of Systems  Review of Systems   Constitutional:  Positive for chills.   HENT: Negative.     Eyes: Negative.    Respiratory: Negative.     Cardiovascular: Negative.    Gastrointestinal:  Positive for abdominal pain, blood in stool, nausea and vomiting.   Endocrine: Negative.    Genitourinary: Negative.    Musculoskeletal: Negative.    Skin: Negative.    Allergic/Immunologic: Negative.    Neurological: Negative.   "  Hematological: Negative.    Psychiatric/Behavioral: Negative.           Physical Exam  Physical Exam  Vitals reviewed.   Constitutional:       Appearance: Normal appearance.   HENT:      Head: Normocephalic and atraumatic.   Cardiovascular:      Rate and Rhythm: Regular rhythm. Tachycardia present.      Pulses: Normal pulses.      Heart sounds: Normal heart sounds.   Pulmonary:      Effort: Pulmonary effort is normal.      Breath sounds: Normal breath sounds.   Abdominal:      General: Abdomen is flat. Bowel sounds are normal. There is no distension.      Palpations: Abdomen is soft. There is no mass.      Tenderness: There is abdominal tenderness. There is no guarding or rebound.      Hernia: No hernia is present.   Musculoskeletal:         General: Normal range of motion.      Cervical back: Normal range of motion and neck supple.   Skin:     General: Skin is warm and dry.   Neurological:      General: No focal deficit present.      Mental Status: She is alert and oriented to person, place, and time.   Psychiatric:         Mood and Affect: Mood normal.         Behavior: Behavior normal.           Last Recorded Vitals  Blood pressure 73/51, pulse (!) 127, temperature 36.5 °C (97.7 °F), temperature source Oral, resp. rate 12, height 1.638 m (5' 4.5\"), weight 52.8 kg (116 lb 6.4 oz), SpO2 96%.    Relevant Results      Scheduled medications  ciprofloxacin, 400 mg, intravenous, q12h  metroNIDAZOLE, 500 mg, intravenous, q8h  pantoprazole, 40 mg, oral, Daily before breakfast   Or  pantoprazole, 40 mg, intravenous, Daily before breakfast  [START ON 11/2/2024] predniSONE, 40 mg, oral, Daily      Continuous medications  heparin, 0-4,500 Units/hr, Last Rate: 1,100 Units/hr (11/01/24 1837)      PRN medications  PRN medications: acetaminophen **OR** acetaminophen **OR** acetaminophen, guaiFENesin, heparin, melatonin, ondansetron **OR** ondansetron, polyethylene glycol      Results for orders placed or performed during the " hospital encounter of 10/31/24 (from the past 24 hours)   CBC and Auto Differential   Result Value Ref Range    WBC 22.6 (H) 4.4 - 11.3 x10*3/uL    nRBC 0.0 0.0 - 0.0 /100 WBCs    RBC 5.11 4.00 - 5.20 x10*6/uL    Hemoglobin 12.7 12.0 - 16.0 g/dL    Hematocrit 40.0 36.0 - 46.0 %    MCV 78 (L) 80 - 100 fL    MCH 24.9 (L) 26.0 - 34.0 pg    MCHC 31.8 (L) 32.0 - 36.0 g/dL    RDW 19.7 (H) 11.5 - 14.5 %    Platelets 1,477 (H) 150 - 450 x10*3/uL    Neutrophils % 72.9 40.0 - 80.0 %    Immature Granulocytes %, Automated 0.6 0.0 - 0.9 %    Lymphocytes % 14.7 13.0 - 44.0 %    Monocytes % 6.8 2.0 - 10.0 %    Eosinophils % 4.0 0.0 - 6.0 %    Basophils % 1.0 0.0 - 2.0 %    Neutrophils Absolute 16.49 (H) 1.20 - 7.70 x10*3/uL    Immature Granulocytes Absolute, Automated 0.13 0.00 - 0.70 x10*3/uL    Lymphocytes Absolute 3.32 1.20 - 4.80 x10*3/uL    Monocytes Absolute 1.53 (H) 0.10 - 1.00 x10*3/uL    Eosinophils Absolute 0.90 (H) 0.00 - 0.70 x10*3/uL    Basophils Absolute 0.22 (H) 0.00 - 0.10 x10*3/uL   Comprehensive metabolic panel   Result Value Ref Range    Glucose 103 (H) 74 - 99 mg/dL    Sodium 137 136 - 145 mmol/L    Potassium 3.8 3.5 - 5.3 mmol/L    Chloride 99 98 - 107 mmol/L    Bicarbonate 30 21 - 32 mmol/L    Anion Gap 12 10 - 20 mmol/L    Urea Nitrogen 13 6 - 23 mg/dL    Creatinine 0.82 0.50 - 1.05 mg/dL    eGFR 89 >60 mL/min/1.73m*2    Calcium 8.9 8.6 - 10.3 mg/dL    Albumin 3.6 3.4 - 5.0 g/dL    Alkaline Phosphatase 111 (H) 33 - 110 U/L    Total Protein 8.1 6.4 - 8.2 g/dL    AST 12 9 - 39 U/L    Bilirubin, Total 0.5 0.0 - 1.2 mg/dL    ALT 9 7 - 45 U/L   Lipase   Result Value Ref Range    Lipase 16 9 - 82 U/L   Lactate   Result Value Ref Range    Lactate 1.2 0.4 - 2.0 mmol/L   Coagulation Screen   Result Value Ref Range    Protime 14.1 (H) 9.8 - 12.8 seconds    INR 1.3 (H) 0.9 - 1.1    aPTT 30 27 - 38 seconds   C-reactive protein   Result Value Ref Range    C-Reactive Protein 8.08 (H) <1.00 mg/dL   Troponin I, High  Sensitivity   Result Value Ref Range    Troponin I, High Sensitivity 9 0 - 13 ng/L   Occult Blood, Stool    Specimen: Stool   Result Value Ref Range    Occult Blood, Stool X1 Negative Negative   Stool Pathogen Panel, PCR    Specimen: Stool   Result Value Ref Range    Campylobacter Group Not Detected Not Detected    Salmonella species Not Detected Not Detected    Shigella species Not Detected Not Detected    Vibrio Group Not Detected Not Detected    Yersinia Enterocolitica Not Detected Not Detected    Shiga Toxin 1 Not Detected Not Detected    Shiga Toxin 2 Not Detected Not Detected    Norovirus GI/GII Not Detected Not Detected    Rotavirus A Not Detected Not Detected   C. difficile, PCR    Specimen: Stool   Result Value Ref Range    C. difficile, PCR Not Detected Not Detected   Blood Culture    Specimen: Peripheral Venipuncture; Blood culture   Result Value Ref Range    Blood Culture Loaded on Instrument - Culture in progress    Blood Culture    Specimen: Peripheral Venipuncture; Blood culture   Result Value Ref Range    Blood Culture Loaded on Instrument - Culture in progress    Heparin Assay   Result Value Ref Range    Heparin Unfractionated <0.1 See Comment Below for Therapeutic Ranges IU/mL   Type and Screen   Result Value Ref Range    ABO TYPE A     Rh TYPE POS     ANTIBODY SCREEN NEG    Heparin Assay, UFH   Result Value Ref Range    Heparin Unfractionated 0.3 See Comment Below for Therapeutic Ranges IU/mL   Green Top   Result Value Ref Range    Extra Tube Hold for add-ons.    Lavender Top   Result Value Ref Range    Extra Tube Hold for add-ons.    Sedimentation rate, automated   Result Value Ref Range    Sedimentation Rate 34 (H) 0 - 20 mm/h   Urinalysis with Reflex Culture and Microscopic   Result Value Ref Range    Color, Urine Yellow Light-Yellow, Yellow, Dark-Yellow    Appearance, Urine Clear Clear    Specific Gravity, Urine >1.050 (N) 1.005 - 1.035    pH, Urine 6.0 5.0, 5.5, 6.0, 6.5, 7.0, 7.5, 8.0     Protein, Urine 50 (1+) (A) NEGATIVE, 10 (TRACE), 20 (TRACE) mg/dL    Glucose, Urine Normal Normal mg/dL    Blood, Urine NEGATIVE NEGATIVE    Ketones, Urine 20 (1+) (A) NEGATIVE mg/dL    Bilirubin, Urine NEGATIVE NEGATIVE    Urobilinogen, Urine Normal Normal mg/dL    Nitrite, Urine NEGATIVE NEGATIVE    Leukocyte Esterase, Urine NEGATIVE NEGATIVE   Urinalysis Microscopic   Result Value Ref Range    WBC, Urine 1-5 1-5, NONE /HPF    RBC, Urine 3-5 NONE, 1-2, 3-5 /HPF    Squamous Epithelial Cells, Urine 1-9 (SPARSE) Reference range not established. /HPF    Mucus, Urine 2+ Reference range not established. /LPF   CBC   Result Value Ref Range    WBC 24.7 (H) 4.4 - 11.3 x10*3/uL    nRBC 0.0 0.0 - 0.0 /100 WBCs    RBC 5.03 4.00 - 5.20 x10*6/uL    Hemoglobin 12.4 12.0 - 16.0 g/dL    Hematocrit 39.5 36.0 - 46.0 %    MCV 79 (L) 80 - 100 fL    MCH 24.7 (L) 26.0 - 34.0 pg    MCHC 31.4 (L) 32.0 - 36.0 g/dL    RDW 20.9 (H) 11.5 - 14.5 %    Platelets 1,209 (H) 150 - 450 x10*3/uL   Heparin Assay, UFH   Result Value Ref Range    Heparin Unfractionated 0.2 See Comment Below for Therapeutic Ranges IU/mL   B-type natriuretic peptide   Result Value Ref Range     (H) 0 - 99 pg/mL   Transthoracic Echo (TTE) Complete   Result Value Ref Range    AV pk uvaldo 1.15 m/s    AV mn grad 3 mmHg    LVOT diam 1.99 cm    MV E/A ratio 0.97     LA vol index A/L 23.4 ml/m2    Tricuspid annular plane systolic excursion 1.5 cm    LV EF 68 %    RV free wall pk S' 18.00 cm/s    LVIDd 4.00 cm    RVSP 101.8 mmHg    Aortic Valve Area by Continuity of VTI 2.49 cm2    Aortic Valve Area by Continuity of Peak Velocity 2.35 cm2    AV pk grad 5 mmHg    LV A4C EF 74.1    Basic metabolic panel   Result Value Ref Range    Glucose 107 (H) 74 - 99 mg/dL    Sodium 136 136 - 145 mmol/L    Potassium 3.8 3.5 - 5.3 mmol/L    Chloride 104 98 - 107 mmol/L    Bicarbonate 24 21 - 32 mmol/L    Anion Gap 12 10 - 20 mmol/L    Urea Nitrogen 10 6 - 23 mg/dL    Creatinine 0.63 0.50 -  1.05 mg/dL    eGFR >90 >60 mL/min/1.73m*2    Calcium 8.1 (L) 8.6 - 10.3 mg/dL   Heparin Assay, UFH   Result Value Ref Range    Heparin Unfractionated 0.3 See Comment Below for Therapeutic Ranges IU/mL     Transthoracic Echo (TTE) Complete    Result Date: 11/1/2024   [facility], [address] [place] [address]              Tel [phone] and Fax [phone] TRANSTHORACIC ECHOCARDIOGRAM REPORT  Patient Name:       VIKI Sosa Physician:    [id number] Maximo Gomez MD Study Date:         11/1/2024           Ordering Provider:    [id number] GAYLE CARMONA MRN/PID:            [id number]            Fellow: Accession#:         [id number]        Nurse: Date of Birth/Age:  1975 / 49      Sonographer:          Coral Horner RDCS                     years Gender assigned at  F                   Additional Staff: Birth: Height:             164.00 cm           Admit Date:           10/31/2024 Weight:             53.00 kg            Admission Status:     Inpatient -                                                               Routine BSA / BMI:          1.57 m2 / 19.71     Encounter#:           [id number]                     kg/m2 Blood Pressure:     105/69 mmHg         Department Location:  [facility] Non                                                               Invasive Study Type:    TRANSTHORACIC ECHO (TTE) COMPLETE Diagnosis/ICD: Saddle embolus of pulmonary artery with acute cor                pulmonale-I26.02 Indication:    Pulmonary Embolism CPT Code:      Echo Complete w Full Doppler-[id number] Patient History: Diabetes:          Yes Pertinent History: PE and Hyperlipidemia. Study Detail: The following Echo studies were performed: 2D, M-Mode, Doppler and               color flow.  PHYSICIAN INTERPRETATION: Left Ventricle: Left ventricular ejection fraction is  normal, by visual estimate at 65-70%. There are no regional left ventricular wall motion abnormalities. The left ventricular cavity size is normal. There is normal septal and mildly increased posterior left ventricular wall thickness. The interventricular septum is flattened in systole and diastole, consistent with right ventricular pressure and volume overload. Left ventricular diastolic filling cannot be determined and left atrial filling pressure cannot be determined, due to E/A wave fusion. Left Atrium: The left atrium is normal in size. Right Ventricle: The right ventricle is severely enlarged. There is reduced right ventricular systolic function. Right Atrium: The right atrium is severely dilated. Aortic Valve: The aortic valve is trileaflet. The aortic valve dimensionless index is 0.80. There is no evidence of aortic valve regurgitation. The peak instantaneous gradient of the aortic valve is 5 mmHg. The mean gradient of the mitral valve is 3 mmHg. Mitral Valve: The mitral valve is normal in structure. There is no evidence of mitral valve regurgitation. Tricuspid Valve: The tricuspid valve is structurally normal. There is moderate tricuspid regurgitation. The Doppler estimated RVSP is severely elevated at 101.8 mmHg. Pulmonic Valve: The pulmonic valve is structurally normal. There is mild to moderate pulmonic valve regurgitation. Pericardium: Moderate pericardial effusion. Aorta: The aortic root is normal. Systemic Veins: The inferior vena cava appears normal in size, with IVC inspiratory collapse less than 50%. In comparison to the previous echocardiogram(s): Compared with study dated 9/12/2024, the RVSP has increased from 81mmHg to 101mmHg.  CONCLUSIONS:  1. Left ventricular ejection fraction is normal, by visual estimate at 65-70%.  2. Left ventricular diastolic filling cannot be determined and left atrial filling pressure cannot be determined, due to E/A wave fusion.  3. Right ventricular volume and  pressure overload.  4. There is reduced right ventricular systolic function.  5. Severely enlarged right ventricle.  6. The right atrium is severely dilated.  7. Moderate pericardial effusion.  8. Moderate tricuspid regurgitation visualized.  9. Severely elevated right ventricular systolic pressure. 10. There is mild to moderate pulmonic valve regurgitation. 11. Compared with study dated 9/12/2024, the RVSP has increased from 81mmHg to 101mmHg. QUANTITATIVE DATA SUMMARY:  2D MEASUREMENTS:          Normal Ranges: LAs:             2.95 cm  (2.7-4.0cm) IVSd:            0.77 cm  (0.6-1.1cm) LVPWd:           0.92 cm  (0.6-1.1cm) LVIDd:           4.00 cm  (3.9-5.9cm) LVIDs:           2.26 cm LV Mass Index:   65 g/m2 LVEDV Index:     24 ml/m2 LV % FS          43.7 %  LA VOLUME:                    Normal Ranges: LA Vol A4C:        33.8 ml    (22+/-6mL/m2) LA Vol A2C:        30.7 ml LA Vol BP:         36.6 ml LA Vol Index A4C:  21.6ml/m2 LA Vol Index A2C:  19.6 ml/m2 LA Vol Index BP:   23.4 ml/m2 LA Area A4C:       12.3 cm2 LA Area A2C:       13.3 cm2 LA Major Axis A4C: 3.8 cm LA Major Axis A2C: 4.9 cm LA Volume Index:   23.3 ml/m2 LA Vol A4C:        31.7 ml LA Vol A2C:        29.6 ml LA Vol Index BSA:  19.6 ml/m2  RA VOLUME BY A/L METHOD:            Normal Ranges: RA Vol A4C:              118.5 ml   (8.3-19.5ml) RA Vol Index A4C:        75.6 ml/m2 RA Area A4C:             29.4 cm2 RA Major Axis A4C:       6.2 cm  AORTA MEASUREMENTS:         Normal Ranges: Ao Sinus, d:        3.50 cm (2.1-3.5cm) Ao STJ, d:          2.50 cm (1.7-3.4cm) Asc Ao, d:          3.10 cm (2.1-3.4cm)  LV SYSTOLIC FUNCTION BY 2D PLANIMETRY (MOD):                      Normal Ranges: EF-A4C View:    74 % (>=55%) EF-A2C View:    69 % EF-Biplane:     71 % EF-Visual:      68 % LV EF Reported: 68 %  LV DIASTOLIC FUNCTION:            Normal Ranges: MV Peak E:             0.51 m/s   (0.7-1.2 m/s) MV Peak A:             0.53 m/s   (0.42-0.7 m/s) E/A Ratio:              0.97       (1.0-2.2) MV e'                  0.065 m/s  (>8.0) MV lateral e'          0.06 m/s MV medial e'           0.07 m/s MV A Dur:              64.59 msec E/e' Ratio:            7.91       (<8.0) a'                     0.17 m/s  MITRAL VALVE:         Normal Ranges: MV DT:        85 msec (150-240msec)  AORTIC VALVE:                     Normal Ranges: AoV Vmax:                1.15 m/s (<=1.7m/s) AoV Peak P.3 mmHg (<20mmHg) AoV Mean PG:             3.4 mmHg (1.7-11.5mmHg) LVOT Max Marc:            0.87 m/s (<=1.1m/s) AoV VTI:                 12.89 cm (18-25cm) LVOT VTI:                10.32 cm LVOT Diameter:           1.99 cm  (1.8-2.4cm) AoV Area, VTI:           2.49 cm2 (2.5-5.5cm2) AoV Area,Vmax:           2.35 cm2 (2.5-4.5cm2) AoV Dimensionless Index: 0.80  RIGHT VENTRICLE: RV Basal 4.70 cm RV Mid   4.00 cm RV Major 6.7 cm TAPSE:   15.0 mm RV s'    0.18 m/s  TRICUSPID VALVE/RVSP:          Normal Ranges: Peak TR Velocity:     4.84 m/s Est. RA Pressure:     8 mmHg RV Syst Pressure:     102 mmHg (< 30mmHg) IVC Diam:             1.60 cm  PULMONIC VALVE:          Normal Ranges: RVOT Vmax:      0.40 m/s (0.6-0.9m/s) PV Max Marc:     0.7 m/s  (0.6-0.9m/s) PV Max P.8 mmHg  AORTA: Asc Ao Diam 3.07 cm  [id number] Maximo Gomez MD Electronically signed on 2024 at 1:48:01 PM  ** Final **     Electrocardiogram, 12-lead PRN ACS symptoms    Result Date: 2024  Sinus tachycardia Biatrial enlargement Right axis deviation Pulmonary disease pattern Abnormal ECG When compared with ECG of 12-SEP-2024 03:34, No significant change was found    CT abdomen pelvis w IV contrast    Result Date: 10/31/2024  Interpreted By:  Krzysztof Candelario, STUDY: CT ABDOMEN PELVIS W IV CONTRAST;  10/31/2024 10:01 pm   INDICATION: Signs/Symptoms:abdominal pain, nausea, diarrhea.     COMPARISON: 2019   ACCESSION NUMBER(S): [id number]   ORDERING CLINICIAN: KAYLEEN ENGLAND   TECHNIQUE: Contiguous axial images  of the abdomen and pelvis were obtained after the intravenous administration of iodinated contrast. Coronal and sagittal reformatted images were reconstructed from the axial data.   FINDINGS: LOWER CHEST: See separate report.     ABDOMEN/PELVIS:   ABDOMINAL WALL: No significant abnormality.   LIVER: There is enlarged measuring 20.3 cm in length. There are no focal parenchymal abnormalities. There is mild periportal edema.   BILE DUCTS: No significant intrahepatic or extrahepatic dilatation.   GALLBLADDER: No significant abnormality.   PANCREAS: No significant abnormality.   SPLEEN: No significant abnormality.   ADRENALS: No significant abnormality.   KIDNEYS, URETERS, BLADDER: No significant abnormality.   REPRODUCTIVE ORGANS: No significant abnormality.   VESSELS: Mild aortic atherosclerosis without AAA.   RETROPERITONEUM/LYMPH NODES: No acute retroperitoneal abnormality. Numerous reactive pericolonic lymph nodes are noted.   BOWEL/MESENTERY/PERITONEUM: There is diffuse inflammation and wall thickening of the rectum, sigmoid colon, descending colon, transverse colon, and majority of the ascending colon. This is most pronounced in the mid transverse colon and there is relative sparing of the cecum. This is progressed in the transverse and ascending colon compared to prior study. The colon demonstrates diffuse featureless appearance through the transverse, descending, and sigmoid colon and rectum. There is relative preservation of haustral anatomy in the ascending colon. There are no fistulas or other penetrating disease. The colon is nondilated. The small bowel is noninflamed and nondilated. The stomach appears within normal limits. The appendix is not identified; however, there are no pericecal inflammatory changes.   No ascites, free air, or fluid collection.     MUSCULOSKELETAL: No acute osseous abnormality. No suspicious osseous lesion.       Diffuse active inflammation of the colon contiguously extending from  the rectum to the ascending colon which is significantly progressed in the transverse colon, again demonstrating characteristic featureless appearance consistent with known ulcerative colitis.   No other acute abnormalities in the abdomen or pelvis.   Please see separate report of the chest for pulmonary findings.   MACRO: None.   Signed by: Krzysztof Candelario 10/31/2024 10:50 PM Dictation workstation:   [id number]    CT angio chest for pulmonary embolism    Result Date: 10/31/2024  Interpreted By:  Krzysztof Candelario, STUDY: CT ANGIO CHEST FOR PULMONARY EMBOLISM;  10/31/2024 10:01 pm   INDICATION: Signs/Symptoms:hx of pe, tachycardia.     COMPARISON: 09/12/2024, 10/20/2021 CT PE chest   ACCESSION NUMBER(S): [id number]   ORDERING CLINICIAN: KAYLEEN ENGLAND   TECHNIQUE: Contiguous axial images of the chest were obtained after the intravenous administration of iodinated contrast using angiographic PE protocol. Coronal and sagittal reformatted images were reconstructed from the axial data. MIP images were created on an independent workstation and reviewed.   FINDINGS:   MEDIASTINUM AND LYMPH NODES:  The esophageal wall appears within normal limits.  No enlarged intrathoracic or axillary lymph nodes by imaging criteria. No pneumomediastinum.   VESSELS:  Normal caliber thoracic aorta without dissection. No significant aortic atherosclerosis. There is a large completely occlusive thrombus within the right pulmonary artery the. This was present on prior study but has significantly increased in size and proximal extent toward the right/left pulmonary artery bifurcation. Specifically, it extends 0.9 cm from the bifurcation today, previously 2.7 cm from the bifurcation. All of the pulmonary artery branches in the right lung are completely occluded. Additionally, the main pulmonary artery has increased and dilatation compared to 09/12/2024, currently 3.9 cm (previously 3.7 cm). It is significantly more dilated compared to  10/20/2021 (3.4 cm) at which time there are tiny segmental pulmonary emboli in the right lower lobe. There is also worsening diffuse dilatation of the pulmonary arteries in the left lung compared to 10/28/2021, including the left pulmonary artery which measures 2.5 cm, previously 2.3 cm on 10/20/2021.   No left-sided pulmonary emboli.   HEART: There is disproportionate dilatation of the right ventricle with bowing of the interventricular septum toward the left ventricle resulting in an RV-LV ratio of 1.65. This is progressed from prior study where the RV-LV ratio is approximately 1.3. the right atrium is also mildly dilated. No coronary artery calcifications. Unchanged moderate pericardial effusion.   LUNG, AIRWAYS, AND PLEURA: There diffuse oligemic hyperlucency of the right lung. There is diffuse interlobular septal thickening in the right lower lobe similar to prior study. There is mosaic perfusion involving the left lung due to chronic thromboembolic disease similar to prior study. There is scattered centrilobular nodules in the right lung likely due to chronic thromboembolic disease. There is scattered scarring and atelectasis bilaterally. There are no pleural effusions or pneumothorax.   OSSEOUS STRUCTURES: No acute osseous abnormality.   CHEST WALL SOFT TISSUES: No discernible abnormality.   UPPER ABDOMEN/OTHER: Please see separate report.       1. Enlargement of pre-existing completely occlusive thrombus within the right main pulmonary artery which now extends to the < 1 cm from the right/left pulmonary artery bifurcation, previously was 2.7 cm from the bifurcation. Worsening of main pulmonary artery dilatation worsening of right heart strain compared to 09/12/2024 and significantly greater compared to 10/20/2021. Patient is undergone attempted mechanical thrombectomy at outside institution on (05/15/2024) demonstrating extensive chronic clot that was difficult to remove per outside note.   2. Over course of  prior studies, there is also continued worsening dilatation of the left-sided pulmonary arteries due to chronic thromboembolic pulmonary hypertension.   3. Diffuse moderate pericardial effusion   MACRO: Krzysztof Candelario discussed the significance and urgency of this critical finding via [place] with Dr. Elizondo  on 10/31/2024 at 10:32 pm.  (**-RCF-**) Findings:  See findings.   Signed by: Krzysztof Candelario 10/31/2024 10:44 PM Dictation workstation:   [id number]        Assessment/Plan     Elise Chávez is a 49 y.o. 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   GI was consulted for management of ulcerative colitis.  Etiologies include infectious colitis, ulcerative colitis flareup.  Infectious stool workup came back negative.  CRP 8.08.     -Diet as tolerated  -Recommend IV steroids 20 mg Solu-Medrol IV every 8 hours and then switch to prednisone taper  -Will order calprotectin  -Recommend outpatient follow-up with GI for colonoscopy and IBD specialist for further management of ulcerative colitis.  Patient was offered to undergo flex sig today but declined  -GI will follow      MARILU Sepulveda-CNP

## 2024-11-01 NOTE — ED TRIAGE NOTES
TRIAGE NOTE   I saw the patient as the Clinician in Triage and performed a brief history and physical exam, established acuity, and ordered appropriate tests to develop basic plan of care. Patient will be seen by an [facility], resident and/or physician who will independently evaluate the patient. Please see subsequent provider notes for further details and disposition.     Brief HPI: In brief, Elise Chávez is a 49 y.o. female that presents for diarrhea.  Patient reports symptoms started about 3 weeks ago.  She reports that she is having over 10 episodes of bloody diarrhea each day.  She does have a history of ulcerative colitis however she does not believe she is having an ulcerative colitis flareup.  She reports that she is not on medication for this.  She reports that she takes natural herbs and supplements for her ulcerative colitis.  She reports that she has had a couple episodes of vomiting.  Reports upper abdominal pain and cramping.  She also reports that she does have a history of PE.  She is not on Eliquis or on any anticoagulant medication.  She reports that she takes natural medications for this as well.  Denies any associated chest pain.  No lightheadedness or dizziness or syncope.  Does feel like her heart is beating rapidly.  Doesn't take eliquis as prescribed because it causes her to cough up blood. No other symptoms or concerns at this time.    Focused Physical exam:   Abdomen soft, nontender, nondistended.  No rebound tenderness or guarding.  No palpable masses.  No pulsatile masses.  Plan/MDM:   Initiating CBC, CMP, lactate, lipase, coagulation screen, urinalysis, stool pathogen panel PCR, C. difficile PCR, fecal occult blood test.  Patient will be seen in the back of the ED for further evaluation and treatment.  I will not be following with this patient during her ED visit.  This is a preliminary evaluation during triage.  Alerted nursing staff patient should be the next to the back of the ED.    I  evaluated this patient in triage with the RN. Due to the patients complaint labs and or imaging were ordered by myself in an attempt to expedite patient care however I am not participating in care after evaluation. This is a preliminary assessment. Pt does not appear in acute distress at this time. They will have a full evaluation as soon as possible. They will be cared for by another provider who will possibly order more labs, imaging or interventions. Pt did not have a full ROS or PE completed by myself however below is a summary with reasons for orders.  For the remainder of the patient's workup and ED course, please refer to the main ED provider note. We discussed need for diagnostic testing including laboratory studies and imaging.  We also discussed that patient may be asked to wait in the waiting room while these tests are pending.  They understand that if they choose to leave without having the testing completed or resulted that we cannot rule out acute life threatening illnesses and the risks involved could lead to worsening condition, permanent disability or even death.     Please see subsequent provider note for further details and disposition

## 2024-11-01 NOTE — ED PROVIDER NOTES
Limitations to History: None  Additional History Obtained from: None    HPI:    Patient is presenting to the emergency department for 3 weeks of abdominal discomfort, diarrhea and nausea with 1 episode of vomiting.  Patient has a history of ulcerative colitis.  She states that she is taking herbal medications for the same.  Also has a history for Pulmonary embolism, JAK2 mutation and thrombocytosis secondary to a COVID infection in 2019.  She was recently admitted in September of this year for hemoptysis and concern for recurrent PE.  She has had multiple thrombectomies secondary to this.  She has been on Eliquis previously and now states that she is taking herbal anticoagulants as well as BESREMi.  She had told the provider in triage that she was having bloody bowel movement she states that she did not notice significant blood during my evaluation.  She states that she has associated nausea which is atypical for her ulcerative colitis.  Has more central abdominal discomfort.  Denies any chest pain or change in her breathing.  Denies any pain or swelling her lower extremities.  Denies any changes in her urination.  Denies any other exacerbating remitting factors to her symptoms.  Her review of systems is otherwise negative.    ------------------------------------------------------------------------------------------------------------------------------------------  Physical Exam:    ED Triage Vitals [10/31/24 2018]   Temperature Heart Rate Respirations BP   37.2 °C (99 °F) (!) 135 20 103/76      Pulse Ox Temp Source Heart Rate Source Patient Position   95 % Temporal Monitor Sitting      BP Location FiO2 (%)     Right arm --        VS: As documented in the triage note and EMR flowsheet from this visit were reviewed.  General: Well appearing. No acute distress.   Eyes: Pupils round and reactive. No scleral icterus. No conjunctival injection  HENT: Atraumatic. Normocephalic.  Dry mucous membranes. Trachea midline  CV:  Tachycardic but regular rhythm, No MRG. No pedal edema appreciated.  Resp: Clear to auscultation bilaterally. Non-labored.    GI: Soft, mild epigastric tenderness to palpation. Nondistended. No guarding, rigidity or rebound  Skin: Warm, dry, intact. No systemic rashes or lesions appreciated.  Extremities: No deformities or pain out of proportion; pulses intact   Neuro: Alert. No focal motor or sensory deficits observed. Speech fluent. Answers questions appropriately.   Psych: Appropriate. [place].    ------------------------------------------------------------------------------------------------------------------------------------------    Medical Decision Making  Patient is presenting to the emergency department due to nausea diarrhea and abdominal pain.  On exam the patient is awake and alert, appears dehydrated with dry mucous membranes.  Has a history of pulmonary emboli requiring thrombectomy with most recent admission in September of this year.  Concern for tachycardia being secondary to this versus dehydration versus infectious process.  Patient is afebrile lactate within normal limits making decreased perfusion less likely.  Due to the patient's history will add a CT scan to the workup started by the provider in triage.  IV fluids and antiemetics were ordered for the patient.  Will start the patient on IV antibiotics and obtain cultures secondary to the patient's leukocytosis.  Patient will likely require admission.  Plan of care was discussed with the patient and family at the bedside.    External Records Reviewed: I reviewed recent and relevant outside records including: prior discharge summary  Escalation of Care: Appropriate for handoff/admission  Social Determinants Affecting Care:Multiple chronic illnesses  Prescription Drug Consideration: per orders  Diagnostic testing considered: per orders  Discussion of Management with Other Providers: I discussed the patient/results with: handoff team    Objective  Data  I have independently interpreted the following labs, imaging studies and MDM added to ED Course  Labs Reviewed   CBC WITH AUTO DIFFERENTIAL - Abnormal       Result Value    WBC 22.6 (*)     nRBC 0.0      RBC 5.11      Hemoglobin 12.7      Hematocrit 40.0      MCV 78 (*)     MCH 24.9 (*)     MCHC 31.8 (*)     RDW 19.7 (*)     Platelets 1,477 (*)     Neutrophils % 72.9      Immature Granulocytes %, Automated 0.6      Lymphocytes % 14.7      Monocytes % 6.8      Eosinophils % 4.0      Basophils % 1.0      Neutrophils Absolute 16.49 (*)     Immature Granulocytes Absolute, Automated 0.13      Lymphocytes Absolute 3.32      Monocytes Absolute 1.53 (*)     Eosinophils Absolute 0.90 (*)     Basophils Absolute 0.22 (*)    COMPREHENSIVE METABOLIC PANEL - Abnormal    Glucose 103 (*)     Sodium 137      Potassium 3.8      Chloride 99      Bicarbonate 30      Anion Gap 12      Urea Nitrogen 13      Creatinine 0.82      eGFR 89      Calcium 8.9      Albumin 3.6      Alkaline Phosphatase 111 (*)     Total Protein 8.1      AST 12      Bilirubin, Total 0.5      ALT 9     COAGULATION SCREEN - Abnormal    Protime 14.1 (*)     INR 1.3 (*)     aPTT 30      Narrative:     The APTT is no longer used for monitoring Unfractionated Heparin Therapy. For monitoring Heparin Therapy, use the Heparin Assay.   OCCULT BLOOD X1, STOOL - Normal    Occult Blood, Stool X1 Negative     LIPASE - Normal    Lipase 16      Narrative:     Venipuncture immediately after or during the administration of Metamizole may lead to falsely low results. Testing should be performed immediately prior to Metamizole dosing.   LACTATE - Normal    Lactate 1.2      Narrative:     Venipuncture immediately after or during the administration of Metamizole may lead to falsely low results. Testing should be performed immediately prior to Metamizole dosing.   STOOL PATHOGEN PANEL, PCR   C. DIFFICILE, PCR   BLOOD CULTURE   BLOOD CULTURE   URINALYSIS WITH REFLEX CULTURE AND  MICROSCOPIC    Narrative:     The following orders were created for panel order Urinalysis with Reflex Culture and Microscopic.  Procedure                               Abnormality         Status                     ---------                               -----------         ------                     Urinalysis with Reflex C...[[id number]]                                                 Extra Urine Gray Tube[[id number]]                                                         Please view results for these tests on the individual orders.   URINALYSIS WITH REFLEX CULTURE AND MICROSCOPIC   EXTRA URINE GRAY TUBE   PATH REVIEW-CBC DIFFERENTIAL       CT angio chest for pulmonary embolism    (Results Pending)   CT abdomen pelvis w IV contrast    (Results Pending)       ED Course  ED Course as of 10/31/24 2149   Thu Oct 31, 2024   2143 Occult blood negative, metabolic panel within normal limits, white blood cell count noted to be elevated with a left shift urinalysis stool pathogen and imaging currently pending.  Lactate within normal limits. [LP]      ED Course User Index  [LP] Jennifer Rene DO         Diagnoses as of 10/31/24 2149   Diarrhea, unspecified type   Tachycardia       Procedure  Procedures    Disposition: handoff     Jennifer Rene DO  Emergency Medicine  Medical Toxicology     Jennifer Rene DO  10/31/24 2149

## 2024-11-01 NOTE — PROGRESS NOTES
"Elise Chávez is a 49 y.o. female on day 1 of admission presenting with Diarrhea, unspecified type.    Subjective   Patient alert . RA. Heparin gtt infusing.     Objective     Physical Exam  Vitals reviewed.   HENT:      Head: Normocephalic.      Right Ear: Tympanic membrane normal.      Nose: Nose normal.      Mouth/Throat:      Mouth: Mucous membranes are moist.   Eyes:      Pupils: Pupils are equal, round, and reactive to light.   Cardiovascular:      Rate and Rhythm: Normal rate.   Pulmonary:      Effort: Pulmonary effort is normal.   Abdominal:      General: Abdomen is flat. There is no distension.      Tenderness: There is no abdominal tenderness. There is no guarding.   Genitourinary:     Comments: deferred  Musculoskeletal:         General: Normal range of motion.      Cervical back: Normal range of motion.   Skin:     General: Skin is warm.      Capillary Refill: Capillary refill takes less than 2 seconds.   Neurological:      General: No focal deficit present.      Mental Status: She is alert and oriented to person, place, and time. Mental status is at baseline.   Psychiatric:         Mood and Affect: Mood normal.         Behavior: Behavior normal.         Thought Content: Thought content normal.         Judgment: Judgment normal.         Last Recorded Vitals  Blood pressure 73/51, pulse (!) 127, temperature 36.5 °C (97.7 °F), temperature source Oral, resp. rate 12, height 1.638 m (5' 4.5\"), weight 52.8 kg (116 lb 6.4 oz), SpO2 96%.  Intake/Output last 3 Shifts:  No intake/output data recorded.    Relevant Results  Results for orders placed or performed during the hospital encounter of 10/31/24 (from the past 24 hours)   CBC and Auto Differential   Result Value Ref Range    WBC 22.6 (H) 4.4 - 11.3 x10*3/uL    nRBC 0.0 0.0 - 0.0 /100 WBCs    RBC 5.11 4.00 - 5.20 x10*6/uL    Hemoglobin 12.7 12.0 - 16.0 g/dL    Hematocrit 40.0 36.0 - 46.0 %    MCV 78 (L) 80 - 100 fL    MCH 24.9 (L) 26.0 - 34.0 pg    MCHC 31.8 " (L) 32.0 - 36.0 g/dL    RDW 19.7 (H) 11.5 - 14.5 %    Platelets 1,477 (H) 150 - 450 x10*3/uL    Neutrophils % 72.9 40.0 - 80.0 %    Immature Granulocytes %, Automated 0.6 0.0 - 0.9 %    Lymphocytes % 14.7 13.0 - 44.0 %    Monocytes % 6.8 2.0 - 10.0 %    Eosinophils % 4.0 0.0 - 6.0 %    Basophils % 1.0 0.0 - 2.0 %    Neutrophils Absolute 16.49 (H) 1.20 - 7.70 x10*3/uL    Immature Granulocytes Absolute, Automated 0.13 0.00 - 0.70 x10*3/uL    Lymphocytes Absolute 3.32 1.20 - 4.80 x10*3/uL    Monocytes Absolute 1.53 (H) 0.10 - 1.00 x10*3/uL    Eosinophils Absolute 0.90 (H) 0.00 - 0.70 x10*3/uL    Basophils Absolute 0.22 (H) 0.00 - 0.10 x10*3/uL   Comprehensive metabolic panel   Result Value Ref Range    Glucose 103 (H) 74 - 99 mg/dL    Sodium 137 136 - 145 mmol/L    Potassium 3.8 3.5 - 5.3 mmol/L    Chloride 99 98 - 107 mmol/L    Bicarbonate 30 21 - 32 mmol/L    Anion Gap 12 10 - 20 mmol/L    Urea Nitrogen 13 6 - 23 mg/dL    Creatinine 0.82 0.50 - 1.05 mg/dL    eGFR 89 >60 mL/min/1.73m*2    Calcium 8.9 8.6 - 10.3 mg/dL    Albumin 3.6 3.4 - 5.0 g/dL    Alkaline Phosphatase 111 (H) 33 - 110 U/L    Total Protein 8.1 6.4 - 8.2 g/dL    AST 12 9 - 39 U/L    Bilirubin, Total 0.5 0.0 - 1.2 mg/dL    ALT 9 7 - 45 U/L   Lipase   Result Value Ref Range    Lipase 16 9 - 82 U/L   Lactate   Result Value Ref Range    Lactate 1.2 0.4 - 2.0 mmol/L   Coagulation Screen   Result Value Ref Range    Protime 14.1 (H) 9.8 - 12.8 seconds    INR 1.3 (H) 0.9 - 1.1    aPTT 30 27 - 38 seconds   Occult Blood, Stool    Specimen: Stool   Result Value Ref Range    Occult Blood, Stool X1 Negative Negative   Heparin Assay   Result Value Ref Range    Heparin Unfractionated <0.1 See Comment Below for Therapeutic Ranges IU/mL   Type and Screen   Result Value Ref Range    ABO TYPE A     Rh TYPE POS     ANTIBODY SCREEN NEG    Heparin Assay, UFH   Result Value Ref Range    Heparin Unfractionated 0.3 See Comment Below for Therapeutic Ranges IU/mL   Green Top    Result Value Ref Range    Extra Tube Hold for add-ons.    Lavender Top   Result Value Ref Range    Extra Tube Hold for add-ons.                   Assessment/Plan   Assessment & Plan  Diarrhea, unspecified type    Elise Chávez is a 50 y/o Female PMH: hx ulcerative colitis, hx JAK2 mutation, thrombocythemia, and thrombocytosis that developed after a COVID infection in 2019,  hx 5 previous pulmonary emboli in the past, and two thromboembolectomy procedures, one in 2021 and a second in May 2024 (not compliant with ac). Patient endorses that she has been on long term Eliquis for above but stopped in amy of natural supplements for anticoagulation use and has recently started besremi therapy in 09/2024 for her elevated platelets and JAK2 mutation, presenting to the ED with multiple complaints including abdominal pain, nausea/vomiting, and bloody diarrhea x 3 weeks. Patient endorses that she initially attributed her symptoms to an ulcerative colitis flair which she frequently has however, symptoms have progressed to the point where she is not able to keep anything at all down which prompted her to seek further evaluation in the ED. CT a/p revealing diffuse active inflammation of the colon  which is significantly progressed in the transverse colon demonstrating characteristic featureless appearance consistent with known ulcerative colitis. CTA chest revealing enlargement of pre-existing completely occlusive thrombus within the right main pulmonary artery. Patient tachycardic on arrival. Patient was subsequently placed on a heparin gtt, GI consulted in the ED. Patient was subsequently admitted to inpatient for further monitoring of above symptoms.        ED Course:  EKG on arrival: sinus tachycardia hr 123  CTA chest: Enlargement of pre-existing completely occlusive thrombus within   the right main pulmonary artery which now extends to the < 1 cm from the right/left pulmonary artery bifurcation, previously was 2.7 cm from the  bifurcation. Worsening of main pulmonary artery dilatation worsening of right heart strain compared to 2024 and significantly greater compared to 10/20/2021. Patient is undergone attempted mechanical thrombectomy at outside institution on   (05/15/2024) demonstrating extensive chronic clot that was difficult to remove per outside note. Over course of prior studies, there is also continued worsening   dilatation of the left-sided pulmonary arteries due to chronic thromboembolic pulmonary hypertension. 3. Diffuse moderate pericardial effusion.  CT a/p: Diffuse active inflammation of the colon contiguously extending from   the rectum to the ascending colon which is significantly progressed in the transverse colon, again demonstrating characteristic   featureless appearance consistent with known ulcerative colitis.   Glucose: 103  INR: 1.3  WBC: 22.6  H.7  Platelets: 1,477  ED Medications Administered:  Heparin bolus 4,424 IV x1  LR 1000cc bolus x1  Zofran 4mg IV push x1  Zosyn 4.5 g IV x1  NS 1000cc bolus x1            #Abdominal Pain  #Ulcerative Colitis likely 2/2  #hx UC  -CT a/p: Diffuse active inflammation of the colon contiguously extending from   the rectum to the ascending colon which is significantly progressed in the transverse colon, again demonstrating characteristic   featureless appearance consistent with known ulcerative colitis.  -hg stable  -IV abt  -stool pathogen panel : pending  -PRN pain control  -PRN antiemetics  -GI consulted in ED; appreciate recs        #hx Enlargement of known large thrombus in right main PA  #hx JAK2 Mutation  #hx 5 previous pulmonary emboli in the past, and two thromboembolectomy procedures, one in  and a second in May 2024.   -CTA chest: Enlargement of pre-existing completely occlusive thrombus within   the right main pulmonary artery which now extends to the < 1 cm from the right/left pulmonary artery bifurcation, previously was 2.7 cm from the bifurcation.  Worsening of main pulmonary artery dilatation worsening of right heart strain compared to 09/12/2024 and significantly greater compared to 10/20/2021. Patient is undergone attempted mechanical thrombectomy at outside institution on   (05/15/2024) demonstrating extensive chronic clot that was difficult to remove per outside note.  -pt recently started Besremi outpt in 09/2024   -pt follows with Dr. Eleuterio Franklin (Medical Oncology) outpt   -started on heparin gtt in ED  -TTE : pending  -LLE Venous Duplex : Pt refusing*  -Pulmonology consulted in ED ; appreciate recs  -Consider Hematology consult if warranted    #Tachycardia  #hypotensive  #orthostatic BP -Positive  -EKG on arrival: EKG on arrival: sinus tachycardia hr 123  -telemetry  -EKG PRN  -Pt was a RR this am for hr 141 (11/01/24) at 4:37am  -hr now 127-133)   -NS Fluid bolus ordered  -TTE : pending  -Consider Cardiology consult if warranted    #DVT prophylaxis  -scds  -heparin gtt    DC plan:  DC home when medically stable. Consider transfer to SDU  if clinical condition deteriorates, pt is stable upon my examination for RNF; will continue to reassess throughout the day. Interdisciplinary rounding completed with Attending Provider, Bedside RN and TCC.  Labs, results and plan of care discussed and reviewed with Dr. Persaud.          I spent 35 minutes in the professional and overall care of this patient.  Rosio Nichols, APRN-CNP

## 2024-11-01 NOTE — CONSULTS
Inpatient consult to Hematology  Consult performed by: MARILU Roberson-CNP  Consult ordered by: Milton Persaud MD  Reason for consult: Essential thrombocytosis  Assessment/Recommendations: Elise Chávez is a 49 y.o. female presenting with multiple complaints including abdominal pain, nausea/vomiting, and bloody diarrhea x 3 weeks, she elaborates on this and said it was on and off/not that much blood (occult blood negative), she was cf Cdiff. Her BP was 70s/50s on arrival iso severe dehydration. Patient endorses that she initially attributed her symptoms to an ulcerative colitis flair but her symptoms progressed to the point where she couldn't keep anything down prompting her to go to ED. PMH significant for ET, multiple PEs, HFrEF 40% in September 2024 , chronic pulmonary hypertension, Ulcerative colitis, GIB.      History of multiple PE s with 3 prior thrombectomy procedures, JAK2 mutated essential thrombocythemia (diagnosed in 2019, placed on surveillance). Her first thrombosis was following COVID and Salmonella infection. At that time, no clinical symptomology of thrombosis was reported. Patient was lost to follow-up and she developed progressive thrombocytosis ultimately leading to her first PE in 2020 s/p course of rivaroxaban. Her most recent hospitalization for thrombectomy was 5/2024 for a large right side pulmonary embolism. Initial pulmonary angiogram demonstrated complete occlusion of the right pulmonary artery. Despite extensive clot burden removal, post-thrombectomy contrast angiogram demonstrated complete occlusion beyond the distal right pulmonary artery. The clot removed was described as incredibly chronic and fragmented with calcifications. She was discharged on Pradaxa. She re-presented a few weeks later with hemoptysis and concerns for bleeding on Pradaxa which was recently started due to concerns for failure of other anticoagulants.       Pulm medicine and hematology were consulted during  her  hospital stay in June 2024 and again during the last hospital stay in September 2024.  Multiple recommendations were made and the patient declined the recommendations, stated she would prefer to follow-up with her primary oncologist, Dr. Franklin, who is now practicing at [facility] [place]. She was discharged on Eliquis which she had previously failed d/t concern of bleeding on Pradaxa. She stopped the Eliquis in June and was taking Natto, a natural blood thinner.     Patient followed up with Dr. Preston at the [facility] in June 2024.  He discussed the pathophysiology of her myeloproliferative neoplasm in particular essential thrombocytosis.  He recommended a bone marrow biopsy to evaluate megakaryocyte morphology to establish a true diagnosis due to his concerns for prefibrotic myelofibrosis. Dr. Preston also suggested cytoreduction as a paramount importance however patient refused hydroxyurea due to long-term side effects and refused pheresis to address the thrombocytosis as well. Patient was to follow-up with Dr. Franklin. Per EMR review, patient is on ropeginterferon [id number] 100 mcg for treatment of her ET and it was given by Dr. Cazares 10/23.      Pulm medicine and hematology were consulted during her  hospital stay in September 2024.  PERT team was contacted and recommended transfer to [facility] [place] [facility]. Dr. Juárez, interventional cardiology, recommended further care at [facility]. CTA of the chest on 9/12 revealed Pulmonary arteries are adequately opacified without a large filling  defect in the right main pulmonary artery completely occluding the right pulmonary arterial system. Ultimately a thrombectomy could not be performed. She had a new left heart failure per echo in September and wwas seen by cardiology.     In ED, CT a/p revealing diffuse active inflammation of the colon significantly progressed in the transverse colon consistent with known  ulcerative colitis. CTA chest revealing slight enlargement of pre-existing completely occlusive thrombus within the right main pulmonary artery. Patient tachycardic on arrival, has chronic HR in the upper 120s for years, seen by cardiology in 9/2024, this is compensatory tachycardia, she was advised to initiate GDMT and declined. She talked with Dr. Justin, her  and he advised she take resveratrol instead of ACE or ARB. Patient was placed on a heparin gtt, apixaban held, GI consulted in the ED. Hematology is consulted for anticoagulation recommendations.    Hematology is consulted for anticoagulation recommendations for Chronic saddle pulmonary embolism with acute cor pulmonale iso chronic essential thrombocytosis JAK2 +  - PERT team consulted, advised admission to [facility]  - Team recommended treating her colitis. She is on cipro/flagyl  - not a candidate for acute thrombectomy.  - Anticoagulation is recommended with heparin gtt  - monitor for bleeding  -  Denied hemoptysis recently, no worsening of her baseline VARGAS or chest pain.  Patient has baseline resting tachycardia around 120 bpm. BNP and troponin are negative.  - Worsening of right ventricular pressure on TTE, improved EF, Compared with study dated 9/12/2024, the RVSP has increased from 81mmHg to 101mmHg, LVEF 67%, per repeat TTE 11/1.   - Patient tachycardic/hypotensive on arrival, has chronic HR in the upper 120s for years, seen by cardiology in 9/2024, this is compensatory tachycardia, she was advised to initiate GDMT and declined. She talked with Dr. Justin, her  and he advised she take resveratrol instead of ACE or ARB.  - BP improved following fluids, patient stated severe diarrhea and poor oral intake     - defer HF management to primary team    ET JAK2 +, non compliance with anticoagulation  - ropeginterferon [id number]  100 MCG for ET given  last on 10/23 by Dr. Cazares per recommendations by Dr. Franklin - defer management to   "[facility]  - Patient only recently started treatment for the ET diagnosed in 2019 following COVID  -5 thrombotic events since that time  -  nattokinase, this is a natural blood thinner, recently changed to 1 pill 3 x per day to avoid GIB, she also has reported non compliance with the apixaban because she sometimes sees bloody sputum and holds off on the apixaban  - work up for VWD was negative  - recommend stopping nattokinase if she is randomly concerned about seeing blood in her sputum and seeking help before stopping apixaban, anticoagulation is paramount in her situation but must be held if worsening GIB, she remains non compliant with apixaban and tries to self adjust dose at home without clear reasoning guidance or plan  - recommended resuming Apixaban 5mg by mouth twice daily at discharge with strict compliance to every 12 hours twice per day and setting a phone reminder to ensure compliance and prevent overlap, she will transition from therapeutic heparin - no need to load  - advised to continue heparin gtt while inpatient and monitoring her cardiac status, she was advised she may eventually require OHS 2/2 clot burden but she feels she is \"not there yet\"   -  ropeginterferon [id number] 100 MCG for ET with Dr. Cazares per recommendations by Dr. Franklin, she will f/up with Dr. Franklin in [facility], dose due Tuesday, she has this at home, defer management to her hematologist ordering, she declined f/up with hematology local  - Occult blood negative. Hx ulcerative colitis. Colon is inflamed. No blood in vomit. No bloody bowel movements today.   - closely monitor for GIB  - Dr. Franklin is her hematologist outpatient and approved apixaban for therapeutic AC    Patient hopes to discharge by Tuesday for a job interview, defer discharge planning to attending service. She plans to continue to go to [facility] to see Dr. Franklin. She hopes to start work and will visit her Mom in [facility] more frequently as her Mom, " unfortunately, has been diagnosed with lung cancer.       NO HEMATOLOGY COVERAGE OVER THE WEEKEND PLEASE CALL [facility] WITH QUESTIONS OF CONCERNS                    Reason For Consult  Essential thrombocytosis    History Of Present Illness  Elise Chávez is a 49 y.o. female presenting with multiple complaints including abdominal pain, nausea/vomiting, and bloody diarrhea x 3 weeks, she elaborates on this and said it was on and off/not that much blood (occult blood negative), she was cf Cdiff. Her BP was 70s/50s on arrival iso severe dehydration. Patient endorses that she initially attributed her symptoms to an ulcerative colitis flair but her symptoms progressed to the point where she couldn't keep anything down prompting her to go to ED. PMH significant for ET, multiple PEs, HFrEF 40% in September 2024 , chronic pulmonary hypertension, Ulcerative colitis, GIB.      History of multiple PE s with 3 prior thrombectomy procedures, JAK2 mutated essential thrombocythemia (diagnosed in 2019, placed on surveillance). Her first thrombosis was following COVID and Salmonella infection. At that time, no clinical symptomology of thrombosis was reported. Patient was lost to follow-up and she developed progressive thrombocytosis ultimately leading to her first PE in 2020 s/p course of rivaroxaban. Her most recent hospitalization for thrombectomy was 5/2024 for a large right side pulmonary embolism. Initial pulmonary angiogram demonstrated complete occlusion of the right pulmonary artery. Despite extensive clot burden removal, post-thrombectomy contrast angiogram demonstrated complete occlusion beyond the distal right pulmonary artery. The clot removed was described as incredibly chronic and fragmented with calcifications. She was discharged on Pradaxa. She re-presented a few weeks later with hemoptysis and concerns for bleeding on Pradaxa which was recently started due to concerns for failure of other anticoagulants.       Pulm medicine  and hematology were consulted during her  hospital stay in June 2024 and again during the last hospital stay in September 2024.  Multiple recommendations were made and the patient declined the recommendations, stated she would prefer to follow-up with her primary oncologist, Dr. Franklin, who is now practicing at [facility] [place]. She was discharged on Eliquis which she had previously failed d/t concern of bleeding on Pradaxa. She stopped the Eliquis in June and was taking Natto, a natural blood thinner.     Patient followed up with Dr. Preston at the [facility] in June 2024.  He discussed the pathophysiology of her myeloproliferative neoplasm in particular essential thrombocytosis.  He recommended a bone marrow biopsy to evaluate megakaryocyte morphology to establish a true diagnosis due to his concerns for prefibrotic myelofibrosis. Dr. Preston also suggested cytoreduction as a paramount importance however patient refused hydroxyurea due to long-term side effects and refused pheresis to address the thrombocytosis as well. Patient was to follow-up with Dr. Franklin. Per EMR review, patient is on ropeginterferon [id number] 100 mcg for treatment of her ET and it was given by Dr. Cazares 10/23.      Pulm medicine and hematology were consulted during her  hospital stay in September 2024.  PERT team was contacted and recommended transfer to [facility]. Dr. Juárez, interventional cardiology, recommended further care at [facility]. CTA of the chest on 9/12 revealed Pulmonary arteries are adequately opacified without a large filling  defect in the right main pulmonary artery completely occluding the right pulmonary arterial system. Ultimately a thrombectomy could not be performed. She had a new left heart failure per echo in September and wwas seen by cardiology.     In ED, CT a/p revealing diffuse active inflammation of the colon significantly progressed in the  transverse colon consistent with known ulcerative colitis. CTA chest revealing slight enlargement of pre-existing completely occlusive thrombus within the right main pulmonary artery. Patient tachycardic on arrival, has chronic HR in the upper 120s for years, seen by cardiology in 9/2024, this is compensatory tachycardia, she was advised to initiate GDMT and declined. She talked with Dr. Justin, her  and he advised she take resveratrol instead of ACE or ARB. Patient was placed on a heparin gtt, apixaban held, GI consulted in the ED. Hematology is consulted for anticoagulation recommendations.     Past Medical History  She has a past medical history of Encounter for follow-up examination after completed treatment for conditions other than malignant neoplasm (10/25/2021), Encounter for surveillance of contraceptives, unspecified (08/11/2020), Melena, Other conditions influencing health status, Personal history of other endocrine, nutritional and metabolic disease, Personal history of other specified conditions (12/02/2013), Personal history of other specified conditions (10/16/2020), Personal history of other specified conditions, and Personal history of urinary calculi (12/02/2013).    Surgical History  She has a past surgical history that includes Refractive surgery (06/11/2013); Other surgical history (06/11/2013); and Lithotripsy (12/10/2013).     Social History  She reports that she has never smoked. She has never used smokeless tobacco. No history on file for alcohol use and drug use.    Family History  No family history on file.     Allergies  Patient has no known allergies.    Review of Systems   Constitutional:  Positive for activity change, appetite change and fatigue. Negative for chills and fever.   HENT:  Negative for congestion, mouth sores and sore throat.    Respiratory:  Positive for shortness of breath.    Cardiovascular:  Positive for chest pain.   Gastrointestinal:  Positive for abdominal  "pain, blood in stool, diarrhea, nausea and vomiting.   Genitourinary:  Negative for dysuria and hematuria.   Musculoskeletal:  Negative for arthralgias and myalgias.   Neurological:  Negative for syncope.   Psychiatric/Behavioral:  Negative for confusion.         Physical Exam  Vitals and nursing note reviewed.   Constitutional:       General: She is not in acute distress.     Appearance: She is not ill-appearing or toxic-appearing.   HENT:      Nose: No congestion.      Mouth/Throat:      Mouth: Mucous membranes are moist.   Eyes:      General: No scleral icterus.  Cardiovascular:      Rate and Rhythm: Tachycardia present.   Pulmonary:      Effort: Pulmonary effort is normal. No respiratory distress.   Abdominal:      Palpations: Abdomen is soft.      Tenderness: There is no abdominal tenderness.   Musculoskeletal:      Cervical back: Neck supple.      Right lower leg: No edema.      Left lower leg: No edema.   Lymphadenopathy:      Cervical: No cervical adenopathy.   Skin:     General: Skin is warm and dry.   Neurological:      Mental Status: She is alert and oriented to person, place, and time.   Psychiatric:         Mood and Affect: Mood normal.          Last Recorded Vitals  Blood pressure 104/65, pulse (!) 128, temperature 37.2 °C (99 °F), temperature source Temporal, resp. rate 18, height 1.626 m (5' 4\"), weight 52.6 kg (116 lb), SpO2 93%.    Relevant Results  Results for orders placed or performed during the hospital encounter of 10/31/24 (from the past 96 hours)   CBC and Auto Differential   Result Value Ref Range    WBC 22.6 (H) 4.4 - 11.3 x10*3/uL    nRBC 0.0 0.0 - 0.0 /100 WBCs    RBC 5.11 4.00 - 5.20 x10*6/uL    Hemoglobin 12.7 12.0 - 16.0 g/dL    Hematocrit 40.0 36.0 - 46.0 %    MCV 78 (L) 80 - 100 fL    MCH 24.9 (L) 26.0 - 34.0 pg    MCHC 31.8 (L) 32.0 - 36.0 g/dL    RDW 19.7 (H) 11.5 - 14.5 %    Platelets 1,477 (H) 150 - 450 x10*3/uL    Neutrophils % 72.9 40.0 - 80.0 %    Immature Granulocytes %, " Automated 0.6 0.0 - 0.9 %    Lymphocytes % 14.7 13.0 - 44.0 %    Monocytes % 6.8 2.0 - 10.0 %    Eosinophils % 4.0 0.0 - 6.0 %    Basophils % 1.0 0.0 - 2.0 %    Neutrophils Absolute 16.49 (H) 1.20 - 7.70 x10*3/uL    Immature Granulocytes Absolute, Automated 0.13 0.00 - 0.70 x10*3/uL    Lymphocytes Absolute 3.32 1.20 - 4.80 x10*3/uL    Monocytes Absolute 1.53 (H) 0.10 - 1.00 x10*3/uL    Eosinophils Absolute 0.90 (H) 0.00 - 0.70 x10*3/uL    Basophils Absolute 0.22 (H) 0.00 - 0.10 x10*3/uL   Comprehensive metabolic panel   Result Value Ref Range    Glucose 103 (H) 74 - 99 mg/dL    Sodium 137 136 - 145 mmol/L    Potassium 3.8 3.5 - 5.3 mmol/L    Chloride 99 98 - 107 mmol/L    Bicarbonate 30 21 - 32 mmol/L    Anion Gap 12 10 - 20 mmol/L    Urea Nitrogen 13 6 - 23 mg/dL    Creatinine 0.82 0.50 - 1.05 mg/dL    eGFR 89 >60 mL/min/1.73m*2    Calcium 8.9 8.6 - 10.3 mg/dL    Albumin 3.6 3.4 - 5.0 g/dL    Alkaline Phosphatase 111 (H) 33 - 110 U/L    Total Protein 8.1 6.4 - 8.2 g/dL    AST 12 9 - 39 U/L    Bilirubin, Total 0.5 0.0 - 1.2 mg/dL    ALT 9 7 - 45 U/L   Lipase   Result Value Ref Range    Lipase 16 9 - 82 U/L   Lactate   Result Value Ref Range    Lactate 1.2 0.4 - 2.0 mmol/L   Coagulation Screen   Result Value Ref Range    Protime 14.1 (H) 9.8 - 12.8 seconds    INR 1.3 (H) 0.9 - 1.1    aPTT 30 27 - 38 seconds   C-reactive protein   Result Value Ref Range    C-Reactive Protein 8.08 (H) <1.00 mg/dL   Troponin I, High Sensitivity   Result Value Ref Range    Troponin I, High Sensitivity 9 0 - 13 ng/L   Occult Blood, Stool    Specimen: Stool   Result Value Ref Range    Occult Blood, Stool X1 Negative Negative   Stool Pathogen Panel, PCR    Specimen: Stool   Result Value Ref Range    Campylobacter Group Not Detected Not Detected    Salmonella species Not Detected Not Detected    Shigella species Not Detected Not Detected    Vibrio Group Not Detected Not Detected    Yersinia Enterocolitica Not Detected Not Detected    Shiga  Toxin 1 Not Detected Not Detected    Shiga Toxin 2 Not Detected Not Detected    Norovirus GI/GII Not Detected Not Detected    Rotavirus A Not Detected Not Detected   C. difficile, PCR    Specimen: Stool   Result Value Ref Range    C. difficile, PCR Not Detected Not Detected   Blood Culture    Specimen: Peripheral Venipuncture; Blood culture   Result Value Ref Range    Blood Culture Loaded on Instrument - Culture in progress    Blood Culture    Specimen: Peripheral Venipuncture; Blood culture   Result Value Ref Range    Blood Culture Loaded on Instrument - Culture in progress    Heparin Assay   Result Value Ref Range    Heparin Unfractionated <0.1 See Comment Below for Therapeutic Ranges IU/mL   Type and Screen   Result Value Ref Range    ABO TYPE A     Rh TYPE POS     ANTIBODY SCREEN NEG    Heparin Assay, UFH   Result Value Ref Range    Heparin Unfractionated 0.3 See Comment Below for Therapeutic Ranges IU/mL   Green Top   Result Value Ref Range    Extra Tube Hold for add-ons.    Lavender Top   Result Value Ref Range    Extra Tube Hold for add-ons.    Sedimentation rate, automated   Result Value Ref Range    Sedimentation Rate 34 (H) 0 - 20 mm/h   Urinalysis with Reflex Culture and Microscopic   Result Value Ref Range    Color, Urine Yellow Light-Yellow, Yellow, Dark-Yellow    Appearance, Urine Clear Clear    Specific Gravity, Urine >1.050 (N) 1.005 - 1.035    pH, Urine 6.0 5.0, 5.5, 6.0, 6.5, 7.0, 7.5, 8.0    Protein, Urine 50 (1+) (A) NEGATIVE, 10 (TRACE), 20 (TRACE) mg/dL    Glucose, Urine Normal Normal mg/dL    Blood, Urine NEGATIVE NEGATIVE    Ketones, Urine 20 (1+) (A) NEGATIVE mg/dL    Bilirubin, Urine NEGATIVE NEGATIVE    Urobilinogen, Urine Normal Normal mg/dL    Nitrite, Urine NEGATIVE NEGATIVE    Leukocyte Esterase, Urine NEGATIVE NEGATIVE   Urinalysis Microscopic   Result Value Ref Range    WBC, Urine 1-5 1-5, NONE /HPF    RBC, Urine 3-5 NONE, 1-2, 3-5 /HPF    Squamous Epithelial Cells, Urine 1-9  (SPARSE) Reference range not established. /HPF    Mucus, Urine 2+ Reference range not established. /LPF   CBC   Result Value Ref Range    WBC 24.7 (H) 4.4 - 11.3 x10*3/uL    nRBC 0.0 0.0 - 0.0 /100 WBCs    RBC 5.03 4.00 - 5.20 x10*6/uL    Hemoglobin 12.4 12.0 - 16.0 g/dL    Hematocrit 39.5 36.0 - 46.0 %    MCV 79 (L) 80 - 100 fL    MCH 24.7 (L) 26.0 - 34.0 pg    MCHC 31.4 (L) 32.0 - 36.0 g/dL    RDW 20.9 (H) 11.5 - 14.5 %    Platelets 1,209 (H) 150 - 450 x10*3/uL   Heparin Assay, UFH   Result Value Ref Range    Heparin Unfractionated 0.2 See Comment Below for Therapeutic Ranges IU/mL   B-type natriuretic peptide   Result Value Ref Range     (H) 0 - 99 pg/mL   Transthoracic Echo (TTE) Complete   Result Value Ref Range    AV pk uvaldo 1.15 m/s    AV mn grad 3 mmHg    LVOT diam 1.99 cm    MV E/A ratio 0.97     LA vol index A/L 23.4 ml/m2    Tricuspid annular plane systolic excursion 1.5 cm    LV EF 68 %    RV free wall pk S' 18.00 cm/s    LVIDd 4.00 cm    RVSP 101.8 mmHg    Aortic Valve Area by Continuity of VTI 2.49 cm2    Aortic Valve Area by Continuity of Peak Velocity 2.35 cm2    AV pk grad 5 mmHg    LV A4C EF 74.1    Basic metabolic panel   Result Value Ref Range    Glucose 107 (H) 74 - 99 mg/dL    Sodium 136 136 - 145 mmol/L    Potassium 3.8 3.5 - 5.3 mmol/L    Chloride 104 98 - 107 mmol/L    Bicarbonate 24 21 - 32 mmol/L    Anion Gap 12 10 - 20 mmol/L    Urea Nitrogen 10 6 - 23 mg/dL    Creatinine 0.63 0.50 - 1.05 mg/dL    eGFR >90 >60 mL/min/1.73m*2    Calcium 8.1 (L) 8.6 - 10.3 mg/dL   Heparin Assay, UFH   Result Value Ref Range    Heparin Unfractionated 0.3 See Comment Below for Therapeutic Ranges IU/mL     === 10/31/24 ===    CT ABDOMEN PELVIS W IV CONTRAST    - Impression -  Diffuse active inflammation of the colon contiguously extending from  the rectum to the ascending colon which is significantly progressed  in the transverse colon, again demonstrating characteristic  featureless appearance  consistent with known ulcerative colitis.    No other acute abnormalities in the abdomen or pelvis.    Please see separate report of the chest for pulmonary findings.    MACRO:  None.    Signed by: Krzysztof Candelario 10/31/2024 10:50 PM  Dictation workstation:   [id number]    Interpreted By:  Krzysztof Candelario,   STUDY:  CT ANGIO CHEST FOR PULMONARY EMBOLISM;  10/31/2024 10:01 pm      INDICATION:  Signs/Symptoms:hx of pe, tachycardia.          COMPARISON:  09/12/2024, 10/20/2021 CT PE chest      ACCESSION NUMBER(S):  [id number]      ORDERING CLINICIAN:  KAYLEEN ENGLAND      TECHNIQUE:  Contiguous axial images of the chest were obtained after the  intravenous administration of iodinated contrast using angiographic  PE protocol. Coronal and sagittal reformatted images were  reconstructed from the axial data. MIP images were created on an  independent workstation and reviewed.      FINDINGS:      MEDIASTINUM AND LYMPH NODES:  The esophageal wall appears within  normal limits.  No enlarged intrathoracic or axillary lymph nodes by  imaging criteria. No pneumomediastinum.      VESSELS:  Normal caliber thoracic aorta without dissection. No  significant aortic atherosclerosis. There is a large completely  occlusive thrombus within the right pulmonary artery the. This was  present on prior study but has significantly increased in size and  proximal extent toward the right/left pulmonary artery bifurcation.  Specifically, it extends 0.9 cm from the bifurcation today,  previously 2.7 cm from the bifurcation. All of the pulmonary artery  branches in the right lung are completely occluded. Additionally, the  main pulmonary artery has increased and dilatation compared to  09/12/2024, currently 3.9 cm (previously 3.7 cm). It is significantly  more dilated compared to 10/20/2021 (3.4 cm) at which time there are  tiny segmental pulmonary emboli in the right lower lobe. There is  also worsening diffuse dilatation of the pulmonary  arteries in the  left lung compared to 10/28/2021, including the left pulmonary artery  which measures 2.5 cm, previously 2.3 cm on 10/20/2021.      No left-sided pulmonary emboli.      HEART: There is disproportionate dilatation of the right ventricle  with bowing of the interventricular septum toward the left ventricle  resulting in an RV-LV ratio of 1.65. This is progressed from prior  study where the RV-LV ratio is approximately 1.3. the right atrium is  also mildly dilated. No coronary artery calcifications. Unchanged  moderate pericardial effusion.      LUNG, AIRWAYS, AND PLEURA: There diffuse oligemic hyperlucency of the  right lung. There is diffuse interlobular septal thickening in the  right lower lobe similar to prior study. There is mosaic perfusion  involving the left lung due to chronic thromboembolic disease similar  to prior study. There is scattered centrilobular nodules in the right  lung likely due to chronic thromboembolic disease. There is scattered  scarring and atelectasis bilaterally. There are no pleural effusions  or pneumothorax.      OSSEOUS STRUCTURES: No acute osseous abnormality.      CHEST WALL SOFT TISSUES: No discernible abnormality.      UPPER ABDOMEN/OTHER: Please see separate report.      IMPRESSION:  1. Enlargement of pre-existing completely occlusive thrombus within  the right main pulmonary artery which now extends to the < 1 cm from  the right/left pulmonary artery bifurcation, previously was 2.7 cm  from the bifurcation. Worsening of main pulmonary artery dilatation  worsening of right heart strain compared to 09/12/2024 and  significantly greater compared to 10/20/2021. Patient is undergone  attempted mechanical thrombectomy at outside institution on  (05/15/2024) demonstrating extensive chronic clot that was difficult  to remove per outside note.      2. Over course of prior studies, there is also continued worsening  dilatation of the left-sided pulmonary arteries due to  chronic  thromboembolic pulmonary hypertension.      3. Diffuse moderate pericardial effusion      MACRO:  Krzysztof Candelario discussed the significance and urgency of this  critical finding via [facility] HAIKU with Dr. Elizondo  on 10/31/2024  at 10:32 pm.  (**-RCF-**) Findings:  See findings.      Signed by: Krzysztof Candelario 10/31/2024 10:44 PM  Dictation workstation:   [id number]    TRANSTHORACIC ECHOCARDIOGRAM REPORT        Patient Name:       VIKI Sosa Physician:    [id number] Maximo Gomez MD  Study Date:         11/1/2024           Ordering Provider:    [id number] GAYLE CARMONA  MRN/PID:            [id number]            Fellow:  Accession#:         [id number]        Nurse:  Date of Birth/Age:  1975 / 49      Sonographer:          Coral Horner RDCS                      years  Gender assigned at  F                   Additional Staff:  Birth:  Height:             164.00 cm           Admit Date:           10/31/2024  Weight:             53.00 kg            Admission Status:     Inpatient -                                                                Routine  BSA / BMI:          1.57 m2 / 19.71     Encounter#:           [id number]                      kg/m2  Blood Pressure:     105/69 mmHg         Department Location:  [facility]                                                                Invasive     Study Type:    TRANSTHORACIC ECHO (TTE) COMPLETE  Diagnosis/ICD: 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     Study Detail: The following Echo studies were performed: 2D, M-Mode, Doppler and                color flow.        PHYSICIAN INTERPRETATION:  Left  Ventricle: Left ventricular ejection fraction is normal, by visual estimate at 65-70%. There are no regional left ventricular wall motion abnormalities. The left ventricular cavity size is normal. There is normal septal and mildly increased posterior left ventricular wall thickness. The interventricular septum is flattened in systole and diastole, consistent with right ventricular pressure and volume overload. Left ventricular diastolic filling cannot be determined and left atrial filling pressure cannot be determined, due to E/A wave fusion.  Left Atrium: The left atrium is normal in size.  Right Ventricle: The right ventricle is severely enlarged. There is reduced right ventricular systolic function.  Right Atrium: The right atrium is severely dilated.  Aortic Valve: The aortic valve is trileaflet. The aortic valve dimensionless index is 0.80. There is no evidence of aortic valve regurgitation. The peak instantaneous gradient of the aortic valve is 5 mmHg. The mean gradient of the mitral valve is 3 mmHg.  Mitral Valve: The mitral valve is normal in structure. There is no evidence of mitral valve regurgitation.  Tricuspid Valve: The tricuspid valve is structurally normal. There is moderate tricuspid regurgitation. The Doppler estimated RVSP is severely elevated at 101.8 mmHg.  Pulmonic Valve: The pulmonic valve is structurally normal. There is mild to moderate pulmonic valve regurgitation.  Pericardium: Moderate pericardial effusion.  Aorta: The aortic root is normal.  Systemic Veins: The inferior vena cava appears normal in size, with IVC inspiratory collapse less than 50%.  In comparison to the previous echocardiogram(s): Compared with study dated 9/12/2024, the RVSP has increased from 81mmHg to 101mmHg.        CONCLUSIONS:   1. Left ventricular ejection fraction is normal, by visual estimate at 65-70%.   2. Left ventricular diastolic filling cannot be determined and left atrial filling pressure cannot be  determined, due to E/A wave fusion.   3. Right ventricular volume and pressure overload.   4. There is reduced right ventricular systolic function.   5. Severely enlarged right ventricle.   6. The right atrium is severely dilated.   7. Moderate pericardial effusion.   8. Moderate tricuspid regurgitation visualized.   9. Severely elevated right ventricular systolic pressure.  10. There is mild to moderate pulmonic valve regurgitation.  11. Compared with study dated 9/12/2024, the RVSP has increased from 81mmHg to 101mmHg.     QUANTITATIVE DATA SUMMARY:     2D MEASUREMENTS:          Normal Ranges:  LAs:             2.95 cm  (2.7-4.0cm)  IVSd:            0.77 cm  (0.6-1.1cm)  LVPWd:           0.92 cm  (0.6-1.1cm)  LVIDd:           4.00 cm  (3.9-5.9cm)  LVIDs:           2.26 cm  LV Mass Index:   65 g/m2  LVEDV Index:     24 ml/m2  LV % FS          43.7 %        LA VOLUME:                    Normal Ranges:  LA Vol A4C:        33.8 ml    (22+/-6mL/m2)  LA Vol A2C:        30.7 ml  LA Vol BP:         36.6 ml  LA Vol Index A4C:  21.6ml/m2  LA Vol Index A2C:  19.6 ml/m2  LA Vol Index BP:   23.4 ml/m2  LA Area A4C:       12.3 cm2  LA Area A2C:       13.3 cm2  LA Major Axis A4C: 3.8 cm  LA Major Axis A2C: 4.9 cm  LA Volume Index:   23.3 ml/m2  LA Vol A4C:        31.7 ml  LA Vol A2C:        29.6 ml  LA Vol Index BSA:  19.6 ml/m2        RA VOLUME BY A/L METHOD:            Normal Ranges:  RA Vol A4C:              118.5 ml   (8.3-19.5ml)  RA Vol Index A4C:        75.6 ml/m2  RA Area A4C:             29.4 cm2  RA Major Axis A4C:       6.2 cm        AORTA MEASUREMENTS:         Normal Ranges:  Ao Sinus, d:        3.50 cm (2.1-3.5cm)  Ao STJ, d:          2.50 cm (1.7-3.4cm)  Asc Ao, d:          3.10 cm (2.1-3.4cm)        LV SYSTOLIC FUNCTION BY 2D PLANIMETRY (MOD):                       Normal Ranges:  EF-A4C View:    74 % (>=55%)  EF-A2C View:    69 %  EF-Biplane:     71 %  EF-Visual:      68 %  LV EF Reported: 68 %        LV  DIASTOLIC FUNCTION:            Normal Ranges:  MV Peak E:             0.51 m/s   (0.7-1.2 m/s)  MV Peak A:             0.53 m/s   (0.42-0.7 m/s)  E/A Ratio:             0.97       (1.0-2.2)  MV e'                  0.065 m/s  (>8.0)  MV lateral e'          0.06 m/s  MV medial e'           0.07 m/s  MV A Dur:              64.59 msec  E/e' Ratio:            7.91       (<8.0)  a'                     0.17 m/s        MITRAL VALVE:         Normal Ranges:  MV DT:        85 msec (150-240msec)        AORTIC VALVE:                     Normal Ranges:  AoV Vmax:                1.15 m/s (<=1.7m/s)  AoV Peak P.3 mmHg (<20mmHg)  AoV Mean PG:             3.4 mmHg (1.7-11.5mmHg)  LVOT Max Marc:            0.87 m/s (<=1.1m/s)  AoV VTI:                 12.89 cm (18-25cm)  LVOT VTI:                10.32 cm  LVOT Diameter:           1.99 cm  (1.8-2.4cm)  AoV Area, VTI:           2.49 cm2 (2.5-5.5cm2)  AoV Area,Vmax:           2.35 cm2 (2.5-4.5cm2)  AoV Dimensionless Index: 0.80        RIGHT VENTRICLE:  RV Basal 4.70 cm  RV Mid   4.00 cm  RV Major 6.7 cm  TAPSE:   15.0 mm  RV s'    0.18 m/s        TRICUSPID VALVE/RVSP:          Normal Ranges:  Peak TR Velocity:     4.84 m/s  Est. RA Pressure:     8 mmHg  RV Syst Pressure:     102 mmHg (< 30mmHg)  IVC Diam:             1.60 cm        PULMONIC VALVE:          Normal Ranges:  RVOT Vmax:      0.40 m/s (0.6-0.9m/s)  PV Max Marc:     0.7 m/s  (0.6-0.9m/s)  PV Max P.8 mmHg        AORTA:  Asc Ao Diam 3.07 cm        [id number] Maximo Gomez MD  Electronically signed on 2024 at 1:48:01 PM     I spent 80 minutes in the professional and overall care of this patient.

## 2024-11-01 NOTE — PROGRESS NOTES
Pharmacy Medication History Review~~~~PATIENT STATES THAT SHE HAS ALL MEDICATIONS PRESCRIBED BUT BECAME ILL AFTER ONE DOSE OF ELIQUIS 5MG AND HAS NOT BEEN ABLE TO KEEP ANYTHING DOWN. SHE HAS NOT TAKEN ANY OF THESE ORAL MEDICATIONS. PATIENT DECIDED ON HER OWN NOT TO TAKE LOSARTAN 25MG~~~~    Elise Chávez is a 49 y.o. female admitted for Diarrhea, unspecified type. Pharmacy reviewed the patient's [id number] medications and allergies for accuracy.    The list below reflectives the updated PTA list. Please review each medication in order reconciliation for additional clarification and justification.  Prior to Admission Medications   Prescriptions Last Dose Informant     amoxicillin-pot clavulanate (Augmentin) 875-125 mg tablet Not Taking      Sig: Take 1 tablet (875 mg) by mouth 2 times a day.   Patient not taking: Reported on 11/1/2024   apixaban (Eliquis) 5 mg tablet Not Taking      Sig: Take 1 tablet (5 mg) by mouth every 12 hours.   Patient not taking: Reported on 11/1/2024   losartan (Cozaar) 25 mg tablet Not Taking      Sig: Take 1 tablet (25 mg) by mouth once daily.   Patient not taking: Reported on 11/1/2024   predniSONE (Deltasone) 10 mg tablet Not Taking      Sig: Take 4 tablets daily for 3 days, 3 tablets daily for 3 days, 2 tablets daily for 3 days, and 1 tablet daily for 3 days   Patient not taking: Reported on 11/1/2024   ropeginterferon [id number] (Besremi) subcutaneous syringe 10/21/2024 Morning      Sig: Inject 0.2 mL (100 mcg) under the skin every 14 (fourteen) days.      Facility-Administered Medications: None      Allergies  Reviewed by Aleksandra Yoder on 11/1/2024   No Known Allergies         Below are additional concerns with the patient's PTA list.  The following updates were made to the Prior to Admission medication list:     Source of Information:     Medications ADDED:   LOSARTAN 25MG  Medications CHANGED:  N/A  Medications REMOVED:   N/A  Medications NOT TAKING:   Augmentin  875-125mg  Eliquis 5mg  Prednisone 10mg  Losartan 25mg    Allergy reviewed : Yes    Meds 2 Beds : No    Comments: Patient verified medications and doses.     Aleksandra Yoder

## 2024-11-01 NOTE — CONSULTS
Reason For Consult  PE with heart strain    History Of Present Illness  Elise Chávez is a 49 y.o. 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 CTPE showed propagation of the right main PA clot. Pulmonary is consulted for PE with RV strain.    Ms. Chávez has had essential thrombocytosis initially noted in 2019 after a possible COVID infection. She had recurrent Pes starting in 2020 which she was placed on Xarelto for, however she stopped due to hemoptysis. In 2021, she had recurrent PE with signs of CTEPH and PH and had a difficult transcatheter thrombectomy. Thereafter, she was on and off of Eliquis and had a time where she was taking Pradaxa which she stopped due to hemoptysis, however she had continued episodes of acute on chronic PE requiring recurrent admission. There was some concern about her absorption of the anticoagulants due to her UC, however there were episodes where she was not taking anticoagulation. She was not on treatment for her ET until recently where she was started on Besremi, however her platelets have remained >1 million. She has previously declined apharesis. Due to recurrent PE in 5/2024, she had a right sided thrombectomy while in [place]. Most recently, she has noticed increasing tachycardia at rest and on exertion and exertional intolerance. She denies LE edema or syncope. She began to have increasing abdominal pain, diarrhea, nausea and vomiting which prompted her return to the ED.    Ms. Chávez's workup has been significant for sinus tachycardia in the 120-140 range, but otherwise has been hemodynamically stable and on room air saturating 92-95%. Repeat CTPE showed continued R main PA occlusion with  some ?increase in clot size and continued significant RV strain. Repeat TTE showed worsening RV failure with pressure overload and RVSP 101 (has been slowly increasing over the prior years). She was started on a heparin ggt and admitted.      Past Medical History  She has a past medical history of Encounter for follow-up examination after completed treatment for conditions other than malignant neoplasm (10/25/2021), Encounter for surveillance of contraceptives, unspecified (08/11/2020), Melena, Other conditions influencing health status, Personal history of other endocrine, nutritional and metabolic disease, Personal history of other specified conditions (12/02/2013), Personal history of other specified conditions (10/16/2020), Personal history of other specified conditions, and Personal history of urinary calculi (12/02/2013).    Surgical History  She has a past surgical history that includes Refractive surgery (06/11/2013); Other surgical history (06/11/2013); and Lithotripsy (12/10/2013).     Social History  She reports that she has never smoked. She has never used smokeless tobacco. No history on file for alcohol use and drug use.    Family History  No family history on file.     Allergies  Patient has no known allergies.    Review of Systems  Review of Systems   Constitutional:  Negative for chills and fever.   Eyes:  Negative for visual disturbance.   Respiratory:  Positive for shortness of breath. Negative for cough and wheezing.    Cardiovascular:  Positive for palpitations. Negative for chest pain and leg swelling.   Gastrointestinal:  Positive for abdominal pain, diarrhea, nausea and vomiting. Negative for blood in stool.   Musculoskeletal:  Negative for myalgias.   Skin:  Negative for rash.   Neurological:  Negative for syncope and light-headedness.          Physical Exam  Physical Exam  Constitutional:       General: She is not in acute distress.     Appearance: Normal appearance. She is not  "toxic-appearing.   HENT:      Head: Normocephalic and atraumatic.      Nose: No rhinorrhea.      Mouth/Throat:      Mouth: Mucous membranes are moist.   Eyes:      Extraocular Movements: Extraocular movements intact.      Conjunctiva/sclera: Conjunctivae normal.   Cardiovascular:      Rate and Rhythm: Regular rhythm. Tachycardia present.      Heart sounds: Murmur heard.      No friction rub. No gallop.   Pulmonary:      Effort: Pulmonary effort is normal. No respiratory distress.      Breath sounds: Normal breath sounds. No wheezing, rhonchi or rales.   Abdominal:      General: Bowel sounds are normal. There is no distension.   Musculoskeletal:      Cervical back: Normal range of motion.      Right lower leg: No edema.      Left lower leg: No edema.   Skin:     General: Skin is warm and dry.   Neurological:      General: No focal deficit present.      Mental Status: She is alert and oriented to person, place, and time.   Psychiatric:         Mood and Affect: Mood normal.         Behavior: Behavior normal.         Thought Content: Thought content normal.         Judgment: Judgment normal.            Last Recorded Vitals  Blood pressure 104/65, pulse (!) 128, temperature 37.2 °C (99 °F), temperature source Temporal, resp. rate 18, height 1.626 m (5' 4\"), weight 52.6 kg (116 lb), SpO2 93%.    Relevant Results  Results for orders placed or performed during the hospital encounter of 10/31/24 (from the past 24 hours)   CBC and Auto Differential   Result Value Ref Range    WBC 22.6 (H) 4.4 - 11.3 x10*3/uL    nRBC 0.0 0.0 - 0.0 /100 WBCs    RBC 5.11 4.00 - 5.20 x10*6/uL    Hemoglobin 12.7 12.0 - 16.0 g/dL    Hematocrit 40.0 36.0 - 46.0 %    MCV 78 (L) 80 - 100 fL    MCH 24.9 (L) 26.0 - 34.0 pg    MCHC 31.8 (L) 32.0 - 36.0 g/dL    RDW 19.7 (H) 11.5 - 14.5 %    Platelets 1,477 (H) 150 - 450 x10*3/uL    Neutrophils % 72.9 40.0 - 80.0 %    Immature Granulocytes %, Automated 0.6 0.0 - 0.9 %    Lymphocytes % 14.7 13.0 - 44.0 %    " Monocytes % 6.8 2.0 - 10.0 %    Eosinophils % 4.0 0.0 - 6.0 %    Basophils % 1.0 0.0 - 2.0 %    Neutrophils Absolute 16.49 (H) 1.20 - 7.70 x10*3/uL    Immature Granulocytes Absolute, Automated 0.13 0.00 - 0.70 x10*3/uL    Lymphocytes Absolute 3.32 1.20 - 4.80 x10*3/uL    Monocytes Absolute 1.53 (H) 0.10 - 1.00 x10*3/uL    Eosinophils Absolute 0.90 (H) 0.00 - 0.70 x10*3/uL    Basophils Absolute 0.22 (H) 0.00 - 0.10 x10*3/uL   Comprehensive metabolic panel   Result Value Ref Range    Glucose 103 (H) 74 - 99 mg/dL    Sodium 137 136 - 145 mmol/L    Potassium 3.8 3.5 - 5.3 mmol/L    Chloride 99 98 - 107 mmol/L    Bicarbonate 30 21 - 32 mmol/L    Anion Gap 12 10 - 20 mmol/L    Urea Nitrogen 13 6 - 23 mg/dL    Creatinine 0.82 0.50 - 1.05 mg/dL    eGFR 89 >60 mL/min/1.73m*2    Calcium 8.9 8.6 - 10.3 mg/dL    Albumin 3.6 3.4 - 5.0 g/dL    Alkaline Phosphatase 111 (H) 33 - 110 U/L    Total Protein 8.1 6.4 - 8.2 g/dL    AST 12 9 - 39 U/L    Bilirubin, Total 0.5 0.0 - 1.2 mg/dL    ALT 9 7 - 45 U/L   Lipase   Result Value Ref Range    Lipase 16 9 - 82 U/L   Lactate   Result Value Ref Range    Lactate 1.2 0.4 - 2.0 mmol/L   Coagulation Screen   Result Value Ref Range    Protime 14.1 (H) 9.8 - 12.8 seconds    INR 1.3 (H) 0.9 - 1.1    aPTT 30 27 - 38 seconds   C-reactive protein   Result Value Ref Range    C-Reactive Protein 8.08 (H) <1.00 mg/dL   Troponin I, High Sensitivity   Result Value Ref Range    Troponin I, High Sensitivity 9 0 - 13 ng/L   Occult Blood, Stool    Specimen: Stool   Result Value Ref Range    Occult Blood, Stool X1 Negative Negative   Stool Pathogen Panel, PCR    Specimen: Stool   Result Value Ref Range    Campylobacter Group Not Detected Not Detected    Salmonella species Not Detected Not Detected    Shigella species Not Detected Not Detected    Vibrio Group Not Detected Not Detected    Yersinia Enterocolitica Not Detected Not Detected    Shiga Toxin 1 Not Detected Not Detected    Shiga Toxin 2 Not Detected  Not Detected    Norovirus GI/GII Not Detected Not Detected    Rotavirus A Not Detected Not Detected   C. difficile, PCR    Specimen: Stool   Result Value Ref Range    C. difficile, PCR Not Detected Not Detected   Blood Culture    Specimen: Peripheral Venipuncture; Blood culture   Result Value Ref Range    Blood Culture Loaded on Instrument - Culture in progress    Blood Culture    Specimen: Peripheral Venipuncture; Blood culture   Result Value Ref Range    Blood Culture Loaded on Instrument - Culture in progress    Heparin Assay   Result Value Ref Range    Heparin Unfractionated <0.1 See Comment Below for Therapeutic Ranges IU/mL   Type and Screen   Result Value Ref Range    ABO TYPE A     Rh TYPE POS     ANTIBODY SCREEN NEG    Heparin Assay, UFH   Result Value Ref Range    Heparin Unfractionated 0.3 See Comment Below for Therapeutic Ranges IU/mL   Green Top   Result Value Ref Range    Extra Tube Hold for add-ons.    Lavender Top   Result Value Ref Range    Extra Tube Hold for add-ons.    Sedimentation rate, automated   Result Value Ref Range    Sedimentation Rate 34 (H) 0 - 20 mm/h   Urinalysis with Reflex Culture and Microscopic   Result Value Ref Range    Color, Urine Yellow Light-Yellow, Yellow, Dark-Yellow    Appearance, Urine Clear Clear    Specific Gravity, Urine >1.050 (N) 1.005 - 1.035    pH, Urine 6.0 5.0, 5.5, 6.0, 6.5, 7.0, 7.5, 8.0    Protein, Urine 50 (1+) (A) NEGATIVE, 10 (TRACE), 20 (TRACE) mg/dL    Glucose, Urine Normal Normal mg/dL    Blood, Urine NEGATIVE NEGATIVE    Ketones, Urine 20 (1+) (A) NEGATIVE mg/dL    Bilirubin, Urine NEGATIVE NEGATIVE    Urobilinogen, Urine Normal Normal mg/dL    Nitrite, Urine NEGATIVE NEGATIVE    Leukocyte Esterase, Urine NEGATIVE NEGATIVE   Urinalysis Microscopic   Result Value Ref Range    WBC, Urine 1-5 1-5, NONE /HPF    RBC, Urine 3-5 NONE, 1-2, 3-5 /HPF    Squamous Epithelial Cells, Urine 1-9 (SPARSE) Reference range not established. /HPF    Mucus, Urine 2+  Reference range not established. /LPF   CBC   Result Value Ref Range    WBC 24.7 (H) 4.4 - 11.3 x10*3/uL    nRBC 0.0 0.0 - 0.0 /100 WBCs    RBC 5.03 4.00 - 5.20 x10*6/uL    Hemoglobin 12.4 12.0 - 16.0 g/dL    Hematocrit 39.5 36.0 - 46.0 %    MCV 79 (L) 80 - 100 fL    MCH 24.7 (L) 26.0 - 34.0 pg    MCHC 31.4 (L) 32.0 - 36.0 g/dL    RDW 20.9 (H) 11.5 - 14.5 %    Platelets 1,209 (H) 150 - 450 x10*3/uL   Heparin Assay, UFH   Result Value Ref Range    Heparin Unfractionated 0.2 See Comment Below for Therapeutic Ranges IU/mL   B-type natriuretic peptide   Result Value Ref Range     (H) 0 - 99 pg/mL   Transthoracic Echo (TTE) Complete   Result Value Ref Range    AV pk uvaldo 1.15 m/s    AV mn grad 3 mmHg    LVOT diam 1.99 cm    MV E/A ratio 0.97     LA vol index A/L 23.4 ml/m2    Tricuspid annular plane systolic excursion 1.5 cm    LV EF 68 %    RV free wall pk S' 18.00 cm/s    LVIDd 4.00 cm    RVSP 101.8 mmHg    Aortic Valve Area by Continuity of VTI 2.49 cm2    Aortic Valve Area by Continuity of Peak Velocity 2.35 cm2    AV pk grad 5 mmHg    LV A4C EF 74.1    Heparin Assay, UFH   Result Value Ref Range    Heparin Unfractionated 0.3 See Comment Below for Therapeutic Ranges IU/mL     Transthoracic Echo (TTE) Complete    Result Date: 11/1/2024   [facility], [address] [place] [address]              Tel [phone] and Fax [phone] TRANSTHORACIC ECHOCARDIOGRAM REPORT  Patient Name:       VIKI Sosa Physician:    [id number] Maximo Gomez MD Study Date:         11/1/2024           Ordering Provider:    [id number] GAYLE CARMONA MRN/PID:            [id number]            Fellow: Accession#:         [id number]        Nurse: Date of Birth/Age:  1975 / 49      Sonographer:          Coral Horner RDCS                     years Gender assigned at  F                    Additional Staff: Birth: Height:             164.00 cm           Admit Date:           10/31/2024 Weight:             53.00 kg            Admission Status:     Inpatient -                                                               Routine BSA / BMI:          1.57 m2 / 19.71     Encounter#:           [id number]                     kg/m2 Blood Pressure:     105/69 mmHg         Department Location:  [facility]                                                               Invasive Study Type:    TRANSTHORACIC ECHO (TTE) COMPLETE Diagnosis/ICD: Saddle embolus of pulmonary artery with acute cor                pulmonale-I26.02 Indication:    Pulmonary Embolism CPT Code:      Echo Complete w Full [id number] Patient History: Diabetes:          Yes Pertinent History: PE and Hyperlipidemia. Study Detail: The following Echo studies were performed: 2D, M-Mode, Doppler and               color flow.  PHYSICIAN INTERPRETATION: Left Ventricle: Left ventricular ejection fraction is normal, by visual estimate at 65-70%. There are no regional left ventricular wall motion abnormalities. The left ventricular cavity size is normal. There is normal septal and mildly increased posterior left ventricular wall thickness. The interventricular septum is flattened in systole and diastole, consistent with right ventricular pressure and volume overload. Left ventricular diastolic filling cannot be determined and left atrial filling pressure cannot be determined, due to E/A wave fusion. Left Atrium: The left atrium is normal in size. Right Ventricle: The right ventricle is severely enlarged. There is reduced right ventricular systolic function. Right Atrium: The right atrium is severely dilated. Aortic Valve: The aortic valve is trileaflet. The aortic valve dimensionless index is 0.80. There is no evidence of aortic valve regurgitation. The peak instantaneous gradient of the aortic valve is 5 mmHg. The mean gradient of the mitral valve is  3 mmHg. Mitral Valve: The mitral valve is normal in structure. There is no evidence of mitral valve regurgitation. Tricuspid Valve: The tricuspid valve is structurally normal. There is moderate tricuspid regurgitation. The Doppler estimated RVSP is severely elevated at 101.8 mmHg. Pulmonic Valve: The pulmonic valve is structurally normal. There is mild to moderate pulmonic valve regurgitation. Pericardium: Moderate pericardial effusion. Aorta: The aortic root is normal. Systemic Veins: The inferior vena cava appears normal in size, with IVC inspiratory collapse less than 50%. In comparison to the previous echocardiogram(s): Compared with study dated 9/12/2024, the RVSP has increased from 81mmHg to 101mmHg.  CONCLUSIONS:  1. Left ventricular ejection fraction is normal, by visual estimate at 65-70%.  2. Left ventricular diastolic filling cannot be determined and left atrial filling pressure cannot be determined, due to E/A wave fusion.  3. Right ventricular volume and pressure overload.  4. There is reduced right ventricular systolic function.  5. Severely enlarged right ventricle.  6. The right atrium is severely dilated.  7. Moderate pericardial effusion.  8. Moderate tricuspid regurgitation visualized.  9. Severely elevated right ventricular systolic pressure. 10. There is mild to moderate pulmonic valve regurgitation. 11. Compared with study dated 9/12/2024, the RVSP has increased from 81mmHg to 101mmHg. QUANTITATIVE DATA SUMMARY:  2D MEASUREMENTS:          Normal Ranges: LAs:             2.95 cm  (2.7-4.0cm) IVSd:            0.77 cm  (0.6-1.1cm) LVPWd:           0.92 cm  (0.6-1.1cm) LVIDd:           4.00 cm  (3.9-5.9cm) LVIDs:           2.26 cm LV Mass Index:   65 g/m2 LVEDV Index:     24 ml/m2 LV % FS          43.7 %  LA VOLUME:                    Normal Ranges: LA Vol A4C:        33.8 ml    (22+/-6mL/m2) LA Vol A2C:        30.7 ml LA Vol BP:         36.6 ml LA Vol Index A4C:  21.6ml/m2 LA Vol Index A2C:  19.6  ml/m2 LA Vol Index BP:   23.4 ml/m2 LA Area A4C:       12.3 cm2 LA Area A2C:       13.3 cm2 LA Major Axis A4C: 3.8 cm LA Major Axis A2C: 4.9 cm LA Volume Index:   23.3 ml/m2 LA Vol A4C:        31.7 ml LA Vol A2C:        29.6 ml LA Vol Index BSA:  19.6 ml/m2  RA VOLUME BY A/L METHOD:            Normal Ranges: RA Vol A4C:              118.5 ml   (8.3-19.5ml) RA Vol Index A4C:        75.6 ml/m2 RA Area A4C:             29.4 cm2 RA Major Axis A4C:       6.2 cm  AORTA MEASUREMENTS:         Normal Ranges: Ao Sinus, d:        3.50 cm (2.1-3.5cm) Ao STJ, d:          2.50 cm (1.7-3.4cm) Asc Ao, d:          3.10 cm (2.1-3.4cm)  LV SYSTOLIC FUNCTION BY 2D PLANIMETRY (MOD):                      Normal Ranges: EF-A4C View:    74 % (>=55%) EF-A2C View:    69 % EF-Biplane:     71 % EF-Visual:      68 % LV EF Reported: 68 %  LV DIASTOLIC FUNCTION:            Normal Ranges: MV Peak E:             0.51 m/s   (0.7-1.2 m/s) MV Peak A:             0.53 m/s   (0.42-0.7 m/s) E/A Ratio:             0.97       (1.0-2.2) MV e'                  0.065 m/s  (>8.0) MV lateral e'          0.06 m/s MV medial e'           0.07 m/s MV A Dur:              64.59 msec E/e' Ratio:            7.91       (<8.0) a'                     0.17 m/s  MITRAL VALVE:         Normal Ranges: MV DT:        85 msec (150-240msec)  AORTIC VALVE:                     Normal Ranges: AoV Vmax:                1.15 m/s (<=1.7m/s) AoV Peak P.3 mmHg (<20mmHg) AoV Mean PG:             3.4 mmHg (1.7-11.5mmHg) LVOT Max Marc:            0.87 m/s (<=1.1m/s) AoV VTI:                 12.89 cm (18-25cm) LVOT VTI:                10.32 cm LVOT Diameter:           1.99 cm  (1.8-2.4cm) AoV Area, VTI:           2.49 cm2 (2.5-5.5cm2) AoV Area,Vmax:           2.35 cm2 (2.5-4.5cm2) AoV Dimensionless Index: 0.80  RIGHT VENTRICLE: RV Basal 4.70 cm RV Mid   4.00 cm RV Major 6.7 cm TAPSE:   15.0 mm RV s'    0.18 m/s  TRICUSPID VALVE/RVSP:          Normal Ranges: Peak TR Velocity:      4.84 m/s Est. RA Pressure:     8 mmHg RV Syst Pressure:     102 mmHg (< 30mmHg) IVC Diam:             1.60 cm  PULMONIC VALVE:          Normal Ranges: RVOT Vmax:      0.40 m/s (0.6-0.9m/s) PV Max Marc:     0.7 m/s  (0.6-0.9m/s) PV Max P.8 mmHg  AORTA: Asc Ao Diam 3.07 cm  [id number] Maximo Gomez MD Electronically signed on 2024 at 1:48:01 PM  ** Final **     Electrocardiogram, 12-lead PRN ACS symptoms    Result Date: 2024  Sinus tachycardia Biatrial enlargement Right axis deviation Pulmonary disease pattern Abnormal ECG When compared with ECG of 12-SEP-2024 03:34, No significant change was found    CT abdomen pelvis w IV contrast    Result Date: 10/31/2024  Interpreted By:  Krzysztof Candelario, STUDY: CT ABDOMEN PELVIS W IV CONTRAST;  10/31/2024 10:01 pm   INDICATION: Signs/Symptoms:abdominal pain, nausea, diarrhea.     COMPARISON: 2019   ACCESSION NUMBER(S): [id number]   ORDERING CLINICIAN: KAYLEEN ENGLAND   TECHNIQUE: Contiguous axial images of the abdomen and pelvis were obtained after the intravenous administration of iodinated contrast. Coronal and sagittal reformatted images were reconstructed from the axial data.   FINDINGS: LOWER CHEST: See separate report.     ABDOMEN/PELVIS:   ABDOMINAL WALL: No significant abnormality.   LIVER: There is enlarged measuring 20.3 cm in length. There are no focal parenchymal abnormalities. There is mild periportal edema.   BILE DUCTS: No significant intrahepatic or extrahepatic dilatation.   GALLBLADDER: No significant abnormality.   PANCREAS: No significant abnormality.   SPLEEN: No significant abnormality.   ADRENALS: No significant abnormality.   KIDNEYS, URETERS, BLADDER: No significant abnormality.   REPRODUCTIVE ORGANS: No significant abnormality.   VESSELS: Mild aortic atherosclerosis without AAA.   RETROPERITONEUM/LYMPH NODES: No acute retroperitoneal abnormality. Numerous reactive pericolonic lymph nodes are noted.   BOWEL/MESENTERY/PERITONEUM:  There is diffuse inflammation and wall thickening of the rectum, sigmoid colon, descending colon, transverse colon, and majority of the ascending colon. This is most pronounced in the mid transverse colon and there is relative sparing of the cecum. This is progressed in the transverse and ascending colon compared to prior study. The colon demonstrates diffuse featureless appearance through the transverse, descending, and sigmoid colon and rectum. There is relative preservation of haustral anatomy in the ascending colon. There are no fistulas or other penetrating disease. The colon is nondilated. The small bowel is noninflamed and nondilated. The stomach appears within normal limits. The appendix is not identified; however, there are no pericecal inflammatory changes.   No ascites, free air, or fluid collection.     MUSCULOSKELETAL: No acute osseous abnormality. No suspicious osseous lesion.       Diffuse active inflammation of the colon contiguously extending from the rectum to the ascending colon which is significantly progressed in the transverse colon, again demonstrating characteristic featureless appearance consistent with known ulcerative colitis.   No other acute abnormalities in the abdomen or pelvis.   Please see separate report of the chest for pulmonary findings.   MACRO: None.   Signed by: Krzysztof Candelario 10/31/2024 10:50 PM Dictation workstation:   [id number]    CT angio chest for pulmonary embolism    Result Date: 10/31/2024  Interpreted By:  Krzysztof Candelario, STUDY: CT ANGIO CHEST FOR PULMONARY EMBOLISM;  10/31/2024 10:01 pm   INDICATION: Signs/Symptoms:hx of pe, tachycardia.     COMPARISON: 09/12/2024, 10/20/2021 CT PE chest   ACCESSION NUMBER(S): [id number]   ORDERING CLINICIAN: KAYLEEN ENGLAND   TECHNIQUE: Contiguous axial images of the chest were obtained after the intravenous administration of iodinated contrast using angiographic PE protocol. Coronal and sagittal reformatted images were  reconstructed from the axial data. MIP images were created on an independent workstation and reviewed.   FINDINGS:   MEDIASTINUM AND LYMPH NODES:  The esophageal wall appears within normal limits.  No enlarged intrathoracic or axillary lymph nodes by imaging criteria. No pneumomediastinum.   VESSELS:  Normal caliber thoracic aorta without dissection. No significant aortic atherosclerosis. There is a large completely occlusive thrombus within the right pulmonary artery the. This was present on prior study but has significantly increased in size and proximal extent toward the right/left pulmonary artery bifurcation. Specifically, it extends 0.9 cm from the bifurcation today, previously 2.7 cm from the bifurcation. All of the pulmonary artery branches in the right lung are completely occluded. Additionally, the main pulmonary artery has increased and dilatation compared to 09/12/2024, currently 3.9 cm (previously 3.7 cm). It is significantly more dilated compared to 10/20/2021 (3.4 cm) at which time there are tiny segmental pulmonary emboli in the right lower lobe. There is also worsening diffuse dilatation of the pulmonary arteries in the left lung compared to 10/28/2021, including the left pulmonary artery which measures 2.5 cm, previously 2.3 cm on 10/20/2021.   No left-sided pulmonary emboli.   HEART: There is disproportionate dilatation of the right ventricle with bowing of the interventricular septum toward the left ventricle resulting in an RV-LV ratio of 1.65. This is progressed from prior study where the RV-LV ratio is approximately 1.3. the right atrium is also mildly dilated. No coronary artery calcifications. Unchanged moderate pericardial effusion.   LUNG, AIRWAYS, AND PLEURA: There diffuse oligemic hyperlucency of the right lung. There is diffuse interlobular septal thickening in the right lower lobe similar to prior study. There is mosaic perfusion involving the left lung due to chronic thromboembolic  disease similar to prior study. There is scattered centrilobular nodules in the right lung likely due to chronic thromboembolic disease. There is scattered scarring and atelectasis bilaterally. There are no pleural effusions or pneumothorax.   OSSEOUS STRUCTURES: No acute osseous abnormality.   CHEST WALL SOFT TISSUES: No discernible abnormality.   UPPER ABDOMEN/OTHER: Please see separate report.       1. Enlargement of pre-existing completely occlusive thrombus within the right main pulmonary artery which now extends to the < 1 cm from the right/left pulmonary artery bifurcation, previously was 2.7 cm from the bifurcation. Worsening of main pulmonary artery dilatation worsening of right heart strain compared to 09/12/2024 and significantly greater compared to 10/20/2021. Patient is undergone attempted mechanical thrombectomy at outside institution on (05/15/2024) demonstrating extensive chronic clot that was difficult to remove per outside note.   2. Over course of prior studies, there is also continued worsening dilatation of the left-sided pulmonary arteries due to chronic thromboembolic pulmonary hypertension.   3. Diffuse moderate pericardial effusion   MACRO: Krzysztof Candelario discussed the significance and urgency of this critical finding via [employer] with Dr. Elizondo  on 10/31/2024 at 10:32 pm.  (**-RCF-**) Findings:  See findings.   Signed by: Krzysztof Candelario 10/31/2024 10:44 PM Dictation workstation:   [id number]        Assessment/Plan     Elise Chávez is a 49 y.o. 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 CTPE showed ?propagation of the right main PA clot. Pulmonary is consulted for PE with RV  birgit.    Ms. Chávez is having continued significant R main PA chronic PE with signs of worsening CTEPH with RV failure which is likely a driving factor of her tachycardia.    #Chronic PE with R PA occlusion  #CTEPH  #RV failure  #ET on Besremi  #UC in acute flare vs infection    Recommendations:  -Discussed at length the need for more definitive intervention on her chronic PE and that given her recurrent embolectomies with worsening hemodynamics, pulmonary endarterectomy may be the most definitive treatment. Alternative treatment is being evaluated by interventional cardiology. Pt is hesitant to do any surgical intervention due to multiple concerns including financial, insurance, recovery time and wish to not have a scar as well as not wanting any transfusions from anyone who has had a COVID vaccine as well as wanting more definitive treatment for her ET prior to surgery. Still declining hydroxyurea and wishing for possible dose adjustment of her Besremi due to concerns that it may have triggered her UC flare  -Discussed with Dr. Michael Aguirre in interventional cardiology, patient will be presented at multi-disciplinary PE meeting to discuss next best steps  -Added on BNP and trop per IC recs   -Continue high intensity heparin ggt  -Recommend heme consult for ET/anticoagulation recs   -Discussed at length risks of incompletely treated CTEPH  -Appreciate GI input regarding UC flare    I spent 80 minutes in the professional and overall care of this patient.      Michelle Giordano DO

## 2024-11-01 NOTE — PROGRESS NOTES
11/01/24 1756   Discharge Planning   Living Arrangements Alone   Support Systems Children;Family members   Assistance Needed Independent w/ADL   Type of Residence Private residence   Number of Stairs to Enter Residence 1   Number of Stairs Within Residence 12   Home or Post Acute Services None   Expected Discharge Disposition Home   Financial Resource Strain   How hard is it for you to pay for the very basics like food, housing, medical care, and heating? Not very   Housing Stability   In the last 12 months, was there a time when you were not able to pay the mortgage or rent on time? N   In the past 12 months, how many times have you moved where you were living? 0   At any time in the past 12 months, were you homeless or living in a shelter (including now)? N   Transportation Needs   In the past 12 months, has lack of transportation kept you from medical appointments or from getting medications? no   In the past 12 months, has lack of transportation kept you from meetings, work, or from getting things needed for daily living? No     Care Coordinator Note:  Met patient at bedside to discuss discharge planning   Plan: cx pending, IV abx, GI consulted; acute colitis cont hep gtt,   Status: Inpatient d/t Diarrhea  Payor: Diane  Disposition: Home by 11/4/2024-patient request  Patient wants to vote on Tues and Job interview on Wed  Lyssa SWIFT, RN TCC

## 2024-11-01 NOTE — NURSING NOTE
Rapid Response RN Note    The following patient has a RADAR score of 6. Unit: [facility], Room: [address] [place], [place] [address]A, T: 37.1 °C (98.8 °F) (Temporal), P: (!) 141, R: 18, BP: 103/72, PulseOx: 92%    Per bedside RN and charge RN tachycardia normal for pt.     This RN Reviewed above VS with bedside RN. Instructed to call if anything changes or need anything.

## 2024-11-01 NOTE — CARE PLAN
PULMONARY EMBOLISM RESPONSE TEAM (PERT) NOTE    This note represents a summary of a virtual evaluation by the pulmonary embolism response team requested by Dr. Marcelo Elizondo.  Suggestions and impression are summarized from the initial virtual encounter and discussion with the referring medical team. These suggestions supplement but should not be a substitute for bedside evaluation and management by the attending of record.     PERT Members on the Call:  Critical Care Attending: Dr. Erika VELÁSQUEZ Interventional Cardiology Attending: Dr. Michael Aguirre  Vascular Medicine Attending: Dr. Haynes  Critical Care Fellow: Dr. Hendrix    Brief Clinical Summary:  Elise Chávez is a 49 y.o. female presenting with PE. Patient with hx of R PA total occlusive thrombus since 2021 with 2 previous thrombectomy attempts (2021 and 2024) w/o successful recannulization.     Vital Signs  BP 93/67   Pulse (!) 124   Temp 37 °C (98.6 °F) (Oral)   Resp 16   LMP  (LMP Unknown)   SpO2 95%      Laboratory  Recent Labs     10/31/24  2041 09/12/24  0604 09/12/24  0526 09/12/24  0339 10/20/21  1053 09/02/21  0806 09/02/21  0801   TROPHS  --   --  8 13  --   --   --    BNP  --   --   --  49 27 24  --    LACTATE 1.2 1.3  --   --   --   --  1.3         PESI Score [place] WILSON Et al. Arch Intern Med. 2010;[id number].           PESI Score:  77, consistent with JLPESIRISK: Class II, or Low risk (1.7-3.5% 30-day mortality risk) PE.    CT Scan reviewed:  Clot burden R main PA occlusive w/o distal flow  RV/LV ratio 1.65 by CT scan    Contraindications to anticoagulation: None  Contraindications to thrombolytics: UC flare with intermittent GIB    Initial Impressions:  ERS/ESC Pulmonary Embolism Risk Category: JLPECLASS: Intermediate-high risk.    Initial Suggestions/Plans:  Admit to Step down unit at [facility]   Initial therapy suggested: Heparin Drip.  No acute indication for thrombectomy  Additional testing recommended: Echo and DVT US of lower  limbs  Consults suggested: Cardiology, Hematology. Recommend multi-disciplinary discussion with the patient about CTEPH management including possible surgical intervention. If agreeable, she can be transferred to [facility] campus for evaluation.  Additional suggestions for re-evaluation/reconference or retesting: if she becomes hemodynamically unstable.    To re conference the PERT team please call the  Transfer Center at [phone].

## 2024-11-01 NOTE — PROGRESS NOTES
Emergency Medicine Transition of Care Note.    I received Elise Chávez in signout from Dr. Rene.  Please see the previous ED provider note for all HPI, PE and MDM up to the time of signout. This is in addition to the primary record.    In brief Elise Chávez is an 49 y.o. female presenting for   Chief Complaint   Patient presents with    Nausea    Vomiting     At the time of signout we were awaiting: CTA admission    ED Course as of 11/01/24 0128   Thu Oct 31, 2024   2143 Occult blood negative, metabolic panel within normal limits, white blood cell count noted to be elevated with a left shift urinalysis stool pathogen and imaging currently pending.  Lactate within normal limits. [LP]      ED Course User Index  [LP] Jennifer Rene,          Diagnoses as of 11/01/24 0128   Diarrhea, unspecified type   Tachycardia   Chronic saddle pulmonary embolism with acute cor pulmonale (Multi)   Ulcerative colitis with complication, unspecified location (Multi)       Medical Decision Making  I spoke to the PERT team about this patient's large pulm embolism on the right side.  (More of a chronic PE that entirely blacks out the right pulmonary artery.  However on the CT CT scan appears to have extended 1 cm towards the bifurcation.  PERT team feels that this is chronic that they would likely not emergently intervene on it with thrombectomy as this has been attempted in the past and has been calcified with difficult to remove.  I do suggest heparin for the seemingly increased in size.  They think the patient can be admitted to the stepdown unit.  Patient was also found to have a CT scan that shows pancolitis concerning for her ulcerative colitis.  The patient is agreeable to heparin.  The patient is not compliant with her Eliquis.  She is more homeopathic.  This may be difficult to ultimately treat.    Final diagnoses:   [R19.7] Diarrhea, unspecified type   [R00.0] Tachycardia   [I26.02, I27.82] Chronic saddle pulmonary embolism with  acute cor pulmonale (Multi)   [K51.919] Ulcerative colitis with complication, unspecified location (Multi)           Procedure  Procedures    Marcelo Elizondo MD

## 2024-11-01 NOTE — ED TRIAGE NOTES
Bibpv from home for c/o nausea and vomiting after eating and mid and pain and cramping. Endorses diarrhea and vomited once today. Onset of worsening symptoms last week but pt chronically deals with is due to pmh colitis.

## 2024-11-01 NOTE — H&P
Elise Chávez is a 49 y.o. female   Vomiting        Patient with a past medical history of ulcerative colitis polycythemia vera history of JAK2 mutation history of multiple pulmonary emboli s/p thromboembolectomies who decided to get off Eliquis and all her other medications and start natural herbal treatments along with reiki comes in with increasing abdominal pain diarrhea and worsening shortness of breath  Patient attributes her new pulmonary embolism to the fact that she was not able to absorb her oral medications because of the persistent diarrhea  Has had chills but no fever  No recent use of antibiotics  The patient's last colonoscopy was more than 10 years ago for ulcerative colitis  She has not been taking any medications with ulcerative colitis and controls her colitis with the help of reiki    Past Medical History  Past Medical History:   Diagnosis Date    Encounter for follow-up examination after completed treatment for conditions other than malignant neoplasm 10/25/2021    Hospital discharge follow-up    Encounter for surveillance of contraceptives, unspecified 08/11/2020    Contraceptive use    Melena     Hematochezia    Other conditions influencing health status     Nephrolithiasis    Personal history of other endocrine, nutritional and metabolic disease     History of hyperlipidemia    Personal history of other specified conditions 12/02/2013    History of diarrhea    Personal history of other specified conditions 10/16/2020    History of hemoptysis    Personal history of other specified conditions     History of diarrhea    Personal history of urinary calculi 12/02/2013    History of renal calculi       Surgical History  Past Surgical History:   Procedure Laterality Date    LITHOTRIPSY  12/10/2013    Renal Lithotripsy    OTHER SURGICAL HISTORY  06/11/2013    Cystoscopy With Ureteroscopy    REFRACTIVE SURGERY  06/11/2013    Corneal LASIK        Social History  She reports that she has never smoked. She  has never used smokeless tobacco. No history on file for alcohol use and drug use.    Family History  No family history on file.     Allergies  Patient has no known allergies.    Review of Systems   Gastrointestinal:  Positive for vomiting.        Constitutional: Feeling poorly with chills  Eyes: no blurred vision and no diplopia.   ENT: no hearing loss, no tinnitus, no earache, no sore throat, no hoarseness and no swollen glands in the neck.   Cardiovascular: no chest pain, no tightness or heavy pressure, no shortness of breath, no palpitations and no lower extremity edema.   Respiratory: Shortness of breath  Gastrointestinal: Abdominal pain with diarrhea  Genitourinary: no urinary frequency, no dysuria, no hematuria, no burning sensation during urination, urinary stream is not smaller and urinary stream does not start and stop.   Musculoskeletal: no arthralgias, no joint stiffness, no muscle weakness, no back pain and no difficulty walking.   Skin: no rashes, no change in skin color and pigmentation, no skin lesions and no skin lumps.   Neurological: no headaches, no dizziness, no seizures, no tingling, no numbness, no signs and symptoms of stroke and no limb weakness.   Psychiatric: no confusion, no memory lapses or loss, no depression and no sleep disturbances.   Endocrine: no goiter, no thyroid disorder, no diabetes mellitus, no excessive thirst, no dry skin, no cold intolerance, no heat intolerance and no increased urinary frequency.   Hematologic/Lymphatic: is not slow to heal, does not bleed easily, does not bruise easily,   All other systems have been reviewed and are negative for complaint.     Vitals:    11/01/24 1700   BP:    Pulse: (!) 128   Resp: 18   Temp:    SpO2:         Scheduled medications  ciprofloxacin, 400 mg, intravenous, q12h  metroNIDAZOLE, 500 mg, intravenous, q8h  pantoprazole, 40 mg, oral, Daily before breakfast   Or  pantoprazole, 40 mg, intravenous, Daily before  breakfast      Continuous medications  heparin, 0-4,500 Units/hr, Last Rate: 1,100 Units/hr (11/01/24 1230)      PRN medications  PRN medications: acetaminophen **OR** acetaminophen **OR** acetaminophen, guaiFENesin, heparin, melatonin, ondansetron **OR** ondansetron, polyethylene glycol    Results from last 7 days   Lab Units 11/01/24  1049 10/31/24  2041   WBC AUTO x10*3/uL 24.7* 22.6*   HEMOGLOBIN g/dL 12.4 12.7   HEMATOCRIT % 39.5 40.0   PLATELETS AUTO x10*3/uL 1,209* 1,477*     Results from last 7 days   Lab Units 10/31/24  2041   SODIUM mmol/L 137   POTASSIUM mmol/L 3.8   CHLORIDE mmol/L 99   CO2 mmol/L 30   BUN mg/dL 13   CREATININE mg/dL 0.82   CALCIUM mg/dL 8.9   PROTEIN TOTAL g/dL 8.1   BILIRUBIN TOTAL mg/dL 0.5   ALK PHOS U/L 111*   ALT U/L 9   AST U/L 12   GLUCOSE mg/dL 103*     Results from last 7 days   Lab Units 10/31/24  2041   TROPHS ng/L 9        Transthoracic Echo (TTE) Complete   Final Result      CT angio chest for pulmonary embolism   Final Result   1. Enlargement of pre-existing completely occlusive thrombus within   the right main pulmonary artery which now extends to the < 1 cm from   the right/left pulmonary artery bifurcation, previously was 2.7 cm   from the bifurcation. Worsening of main pulmonary artery dilatation   worsening of right heart strain compared to 09/12/2024 and   significantly greater compared to 10/20/2021. Patient is undergone   attempted mechanical thrombectomy at outside institution on   (05/15/2024) demonstrating extensive chronic clot that was difficult   to remove per outside note.        2. Over course of prior studies, there is also continued worsening   dilatation of the left-sided pulmonary arteries due to chronic   thromboembolic pulmonary hypertension.        3. Diffuse moderate pericardial effusion        MACRO:   Krzysztof Candelario discussed the significance and urgency of this   critical finding via [employer] with Dr. Elizondo  on 10/31/2024   at 10:32 pm.   (**-RCF-**) Findings:  See findings.        Signed by: Krzysztof Kodak 10/31/2024 10:44 PM   Dictation workstation:   [id number]      CT abdomen pelvis w IV contrast   Final Result   Diffuse active inflammation of the colon contiguously extending from   the rectum to the ascending colon which is significantly progressed   in the transverse colon, again demonstrating characteristic   featureless appearance consistent with known ulcerative colitis.        No other acute abnormalities in the abdomen or pelvis.        Please see separate report of the chest for pulmonary findings.        MACRO:   None.        Signed by: Krzysztof Candelario 10/31/2024 10:50 PM   Dictation workstation:   [id number]          Physical Exam      Constitutional   General appearance: Alert   Eyes   Inspection of eyes: Sclera and conjunctiva were normal.    Pupil exam: Pupils were equal in size. Extraocular movements were intact.   Pulmonary   Respiratory assessment: No respiratory distress, normal respiratory rhythm and effort.    Auscultation of Lungs: Clear bilateral breath sounds.   Cardiovascular   Auscultation of heart: Galloping heart rhythm  Exam for edema: 1+ edema  Abdomen   Abdominal Exam: Generalized tenderness  Liver and Spleen exam: No hepato-splenomegaly.   Musculoskeletal     Inspection of digits and nails: No clubbing or cyanosis of the fingernails.    Inspection/palpation of joints, bones and muscles: No joint swelling. Normal movement of all extremities.   Skin   Skin inspection: Normal skin color and pigmentation, normal skin turgor and no visible rash.   Neurologic   Cranial nerves: Nerves 2-12 were intact, no focal neuro defects.   Psychiatric   Orientation: Oriented to person, place, and time.    Mood and affect: Normal.      Assessment/Plan      #Acute pulmonary embolism  #Severely elevated right ventricular systolic pressure  #Pulmonary hypertension  #Tachycardia with hypotension  Continue heparin drip  Discussed  with the patient about our recommendations to transfer to the main campus for possible thrombectomy  Patient refuses to go there  Patient also refuses to do Doppler ultrasounds of lower extremities for DVT as she believes she does not have any lower blood clots    #Acute colitis  ?  Infectious versus exacerbation of ulcerative colitis  Stool cultures are pending  We will start her on IV Cipro and Flagyl for now  Gastroenterology has been consulted  Defer use of steroids to them  Advised the patient that we do not have any reiki services available in house    #Thrombocytosis/polycythemia vera  Patient is on BESREMi that she started in July she  She follows with her hematologist in [place]      The patient is very sick with high likelihood of decompensating  Strongly recommend that she be transferred to [facility]  However the patient refuses to be transferred  She also wants to be discharged before Tuesday so she can go and vote

## 2024-11-02 LAB
ANION GAP SERPL CALC-SCNC: 11 MMOL/L (ref 10–20)
BUN SERPL-MCNC: 9 MG/DL (ref 6–23)
CALCIUM SERPL-MCNC: 8.1 MG/DL (ref 8.6–10.3)
CHLORIDE SERPL-SCNC: 104 MMOL/L (ref 98–107)
CO2 SERPL-SCNC: 25 MMOL/L (ref 21–32)
CREAT SERPL-MCNC: 0.56 MG/DL (ref 0.5–1.05)
EGFRCR SERPLBLD CKD-EPI 2021: >90 ML/MIN/1.73M*2
ERYTHROCYTE [DISTWIDTH] IN BLOOD BY AUTOMATED COUNT: 19.3 % (ref 11.5–14.5)
GLUCOSE SERPL-MCNC: 121 MG/DL (ref 74–99)
HCT VFR BLD AUTO: 33 % (ref 36–46)
HGB BLD-MCNC: 10.7 G/DL (ref 12–16)
MCH RBC QN AUTO: 25.5 PG (ref 26–34)
MCHC RBC AUTO-ENTMCNC: 32.4 G/DL (ref 32–36)
MCV RBC AUTO: 79 FL (ref 80–100)
NRBC BLD-RTO: 0 /100 WBCS (ref 0–0)
PLATELET # BLD AUTO: 1340 X10*3/UL (ref 150–450)
POTASSIUM SERPL-SCNC: 3.7 MMOL/L (ref 3.5–5.3)
RBC # BLD AUTO: 4.2 X10*6/UL (ref 4–5.2)
SODIUM SERPL-SCNC: 136 MMOL/L (ref 136–145)
UFH PPP CHRO-ACNC: 0.2 IU/ML
UFH PPP CHRO-ACNC: 0.3 IU/ML
UFH PPP CHRO-ACNC: 0.4 IU/ML
WBC # BLD AUTO: 24 X10*3/UL (ref 4.4–11.3)

## 2024-11-02 PROCEDURE — 2500000004 HC RX 250 GENERAL PHARMACY W/ HCPCS (ALT 636 FOR OP/ED)

## 2024-11-02 PROCEDURE — 99231 SBSQ HOSP IP/OBS SF/LOW 25: CPT

## 2024-11-02 PROCEDURE — 85027 COMPLETE CBC AUTOMATED: CPT | Performed by: EMERGENCY MEDICINE

## 2024-11-02 PROCEDURE — 99233 SBSQ HOSP IP/OBS HIGH 50: CPT | Performed by: STUDENT IN AN ORGANIZED HEALTH CARE EDUCATION/TRAINING PROGRAM

## 2024-11-02 PROCEDURE — 99232 SBSQ HOSP IP/OBS MODERATE 35: CPT | Performed by: INTERNAL MEDICINE

## 2024-11-02 PROCEDURE — 1200000002 HC GENERAL ROOM WITH TELEMETRY DAILY

## 2024-11-02 PROCEDURE — 85520 HEPARIN ASSAY: CPT | Performed by: EMERGENCY MEDICINE

## 2024-11-02 PROCEDURE — 80048 BASIC METABOLIC PNL TOTAL CA: CPT | Performed by: INTERNAL MEDICINE

## 2024-11-02 PROCEDURE — 36415 COLL VENOUS BLD VENIPUNCTURE: CPT | Performed by: EMERGENCY MEDICINE

## 2024-11-02 PROCEDURE — 2500000004 HC RX 250 GENERAL PHARMACY W/ HCPCS (ALT 636 FOR OP/ED): Performed by: EMERGENCY MEDICINE

## 2024-11-02 PROCEDURE — 2500000004 HC RX 250 GENERAL PHARMACY W/ HCPCS (ALT 636 FOR OP/ED): Performed by: INTERNAL MEDICINE

## 2024-11-02 ASSESSMENT — COGNITIVE AND FUNCTIONAL STATUS - GENERAL
MOBILITY SCORE: 24
DAILY ACTIVITIY SCORE: 24

## 2024-11-02 ASSESSMENT — PAIN - FUNCTIONAL ASSESSMENT: PAIN_FUNCTIONAL_ASSESSMENT: 0-10

## 2024-11-02 ASSESSMENT — PAIN SCALES - GENERAL: PAINLEVEL_OUTOF10: 0 - NO PAIN

## 2024-11-02 NOTE — PROGRESS NOTES
Elise Chávez is a 49 y.o. female     Patient refused antibiotics  Patient refusing transfer to [facility]    Review of Systems     Constitutional: no fever, no chills, not feeling poorly, not feeling tired   Cardiovascular: no chest pain   Respiratory: no cough, wheezing or shortness of breath a  Gastrointestinal: Area  Neurological: no headache,   All other systems have been reviewed and are negative for complaint.       Vitals:    11/02/24 1246   BP: 105/73   Pulse:    Resp:    Temp: 37 °C (98.6 °F)   SpO2: 95%        Scheduled medications  ciprofloxacin, 400 mg, intravenous, q12h  methylPREDNISolone sodium succinate (PF), 20 mg, intravenous, q8h  metroNIDAZOLE, 500 mg, intravenous, q8h  pantoprazole, 40 mg, oral, Daily before breakfast   Or  pantoprazole, 40 mg, intravenous, Daily before breakfast      Continuous medications  heparin, 0-4,500 Units/hr, Last Rate: 1,200 Units/hr (11/02/24 1446)      PRN medications  PRN medications: acetaminophen **OR** acetaminophen **OR** acetaminophen, guaiFENesin, heparin, melatonin, ondansetron **OR** ondansetron, polyethylene glycol    Lab Review   Results from last 7 days   Lab Units 11/02/24  0640 11/01/24  1049 10/31/24  2041   WBC AUTO x10*3/uL 24.0* 24.7* 22.6*   HEMOGLOBIN g/dL 10.7* 12.4 12.7   HEMATOCRIT % 33.0* 39.5 40.0   PLATELETS AUTO x10*3/uL 1,340* 1,209* 1,477*     Results from last 7 days   Lab Units 11/02/24  0640 11/01/24  1723 10/31/24  2041   SODIUM mmol/L 136 136 137   POTASSIUM mmol/L 3.7 3.8 3.8   CHLORIDE mmol/L 104 104 99   CO2 mmol/L 25 24 30   BUN mg/dL 9 10 13   CREATININE mg/dL 0.56 0.63 0.82   CALCIUM mg/dL 8.1* 8.1* 8.9   PROTEIN TOTAL g/dL  --   --  8.1   BILIRUBIN TOTAL mg/dL  --   --  0.5   ALK PHOS U/L  --   --  111*   ALT U/L  --   --  9   AST U/L  --   --  12   GLUCOSE mg/dL 121* 107* 103*     Results from last 7 days   Lab Units 11/01/24  1723 10/31/24  2041   TROPHS ng/L 14* 9        Transthoracic Echo (TTE) Complete   Final  Result      CT angio chest for pulmonary embolism   Final Result   1. Enlargement of pre-existing completely occlusive thrombus within   the right main pulmonary artery which now extends to the < 1 cm from   the right/left pulmonary artery bifurcation, previously was 2.7 cm   from the bifurcation. Worsening of main pulmonary artery dilatation   worsening of right heart strain compared to 09/12/2024 and   significantly greater compared to 10/20/2021. Patient is undergone   attempted mechanical thrombectomy at outside institution on   (05/15/2024) demonstrating extensive chronic clot that was difficult   to remove per outside note.        2. Over course of prior studies, there is also continued worsening   dilatation of the left-sided pulmonary arteries due to chronic   thromboembolic pulmonary hypertension.        3. Diffuse moderate pericardial effusion        MACRO:   Krzysztof Candelario discussed the significance and urgency of this   critical finding via [facility] with Dr. Elizondo  on 10/31/2024   at 10:32 pm.  (**-RCF-**) Findings:  See findings.        Signed by: Krzysztof Candelario 10/31/2024 10:44 PM   Dictation workstation:   [id number]      CT abdomen pelvis w IV contrast   Final Result   Diffuse active inflammation of the colon contiguously extending from   the rectum to the ascending colon which is significantly progressed   in the transverse colon, again demonstrating characteristic   featureless appearance consistent with known ulcerative colitis.        No other acute abnormalities in the abdomen or pelvis.        Please see separate report of the chest for pulmonary findings.        MACRO:   None.        Signed by: Krzysztof Candelario 10/31/2024 10:50 PM   Dictation workstation:   [id number]            Physical Exam    Constitutional   General appearance: Alert and in no acute distress.   Pulmonary   Respiratory assessment: No respiratory distress, normal respiratory rhythm and effort.    Auscultation  of Lungs: Clear bilateral breath sounds.   Cardiovascular   Auscultation of heart: Tachycardia exam for edema: Trace peripheral edema.   Abdomen   Abdominal Exam: No bruits, normal bowel sounds, soft, non-tender, no abdominal mass palpated.    Liver and Spleen exam: No hepato-splenomegaly.   Musculoskeletal   Examination of gait: Normal.    Inspection of digits and nails: No clubbing or cyanosis of the fingernails.    Inspection/palpation of joints, bones and muscles: No joint swelling. Normal movement of all extremities.   Neurologic   Cranial nerves: Nerves 2-12 were intact, no focal neuro defects.         Assessment/Plan      #Acute pulmonary embolism  #Severely elevated right ventricular systolic pressure  #Pulmonary hypertension  #Tachycardia with hypotension  Continue heparin drip  Monitor for bleeding     #Acute colitis  Patient refused antibiotics  Stool C. difficile and PCR negative  Continue Solu-Medrol     #Thrombocytosis/polycythemia vera  Patient is on BESREMi that she started in July she  She follows with her hematologist in [place]

## 2024-11-02 NOTE — PROGRESS NOTES
"Elise Chávez is a 49 y.o. female on day 1 of admission presenting with Diarrhea, unspecified type.    Subjective   No acute events overnight.  Patient seen and examined at bedside.  She felt much better after starting IV steroids yesterday which was switched to p.o. today.  She had another episode of vomiting and was not able to keep down food or pills.  Requested to switch her steroids to IV.  Also questioned antibiotics and wants to hold off on antibiotics for now.       Objective     Physical Exam  Vitals reviewed.   Constitutional:       Appearance: Normal appearance. She is ill-appearing.   HENT:      Head: Normocephalic and atraumatic.   Cardiovascular:      Rate and Rhythm: Regular rhythm. Tachycardia present.      Pulses: Normal pulses.      Heart sounds: Normal heart sounds.   Pulmonary:      Effort: Pulmonary effort is normal.      Breath sounds: Normal breath sounds.   Abdominal:      General: Abdomen is flat. Bowel sounds are normal. There is no distension.      Palpations: Abdomen is soft.      Tenderness: There is abdominal tenderness. There is no guarding.   Musculoskeletal:         General: Normal range of motion.      Cervical back: Normal range of motion and neck supple.   Skin:     General: Skin is warm and dry.   Neurological:      General: No focal deficit present.      Mental Status: She is alert and oriented to person, place, and time.   Psychiatric:         Mood and Affect: Mood normal.         Behavior: Behavior normal.         Last Recorded Vitals  Blood pressure 105/73, pulse (!) 113, temperature 37 °C (98.6 °F), temperature source Temporal, resp. rate 18, height 1.626 m (5' 4\"), weight 52.6 kg (116 lb), SpO2 95%.  Intake/Output last 3 Shifts:  I/O last 3 completed shifts:  In: 90.6 (1.7 mL/kg) [I.V.:90.6 (1.7 mL/kg)]  Out: 100 (1.9 mL/kg) [Urine:100 (0.1 mL/kg/hr)]  Weight: 52.6 kg     Relevant Results          Scheduled medications  ciprofloxacin, 400 mg, intravenous, " q12h  methylPREDNISolone sodium succinate (PF), 20 mg, intravenous, q8h  metroNIDAZOLE, 500 mg, intravenous, q8h  pantoprazole, 40 mg, oral, Daily before breakfast   Or  pantoprazole, 40 mg, intravenous, Daily before breakfast      Continuous medications  heparin, 0-4,500 Units/hr, Last Rate: 1,200 Units/hr (11/02/24 0955)      PRN medications  PRN medications: acetaminophen **OR** acetaminophen **OR** acetaminophen, guaiFENesin, heparin, melatonin, ondansetron **OR** ondansetron, polyethylene glycol      Results for orders placed or performed during the hospital encounter of 10/31/24 (from the past 24 hours)   Basic metabolic panel   Result Value Ref Range    Glucose 107 (H) 74 - 99 mg/dL    Sodium 136 136 - 145 mmol/L    Potassium 3.8 3.5 - 5.3 mmol/L    Chloride 104 98 - 107 mmol/L    Bicarbonate 24 21 - 32 mmol/L    Anion Gap 12 10 - 20 mmol/L    Urea Nitrogen 10 6 - 23 mg/dL    Creatinine 0.63 0.50 - 1.05 mg/dL    eGFR >90 >60 mL/min/1.73m*2    Calcium 8.1 (L) 8.6 - 10.3 mg/dL   Heparin Assay, UFH   Result Value Ref Range    Heparin Unfractionated 0.3 See Comment Below for Therapeutic Ranges IU/mL   Troponin I, High Sensitivity   Result Value Ref Range    Troponin I, High Sensitivity 14 (H) 0 - 13 ng/L   Heparin Assay, UFH   Result Value Ref Range    Heparin Unfractionated 0.3 See Comment Below for Therapeutic Ranges IU/mL   Basic metabolic panel   Result Value Ref Range    Glucose 121 (H) 74 - 99 mg/dL    Sodium 136 136 - 145 mmol/L    Potassium 3.7 3.5 - 5.3 mmol/L    Chloride 104 98 - 107 mmol/L    Bicarbonate 25 21 - 32 mmol/L    Anion Gap 11 10 - 20 mmol/L    Urea Nitrogen 9 6 - 23 mg/dL    Creatinine 0.56 0.50 - 1.05 mg/dL    eGFR >90 >60 mL/min/1.73m*2    Calcium 8.1 (L) 8.6 - 10.3 mg/dL   Heparin Assay, UFH   Result Value Ref Range    Heparin Unfractionated 0.2 See Comment Below for Therapeutic Ranges IU/mL   CBC   Result Value Ref Range    WBC 24.0 (H) 4.4 - 11.3 x10*3/uL    nRBC 0.0 0.0 - 0.0 /100  WBCs    RBC 4.20 4.00 - 5.20 x10*6/uL    Hemoglobin 10.7 (L) 12.0 - 16.0 g/dL    Hematocrit 33.0 (L) 36.0 - 46.0 %    MCV 79 (L) 80 - 100 fL    MCH 25.5 (L) 26.0 - 34.0 pg    MCHC 32.4 32.0 - 36.0 g/dL    RDW 19.3 (H) 11.5 - 14.5 %    Platelets 1,340 (H) 150 - 450 x10*3/uL    Transthoracic Echo (TTE) Complete    Result Date: 11/1/2024   [facility] - [place], [address] [place] [address]              Tel [phone] and Fax [phone] TRANSTHORACIC ECHOCARDIOGRAM REPORT  Patient Name:       VIKI Sosa Physician:    [id number] Maximo Gomez MD Study Date:         11/1/2024           Ordering Provider:    [id number] GAYLE CARMONA MRN/PID:            [id number]            Fellow: Accession#:         [id number]        Nurse: Date of Birth/Age:  1975 / 49      Sonographer:          Coral Horner RDCS                     years Gender assigned at  F                   Additional Staff: Birth: Height:             164.00 cm           Admit Date:           10/31/2024 Weight:             53.00 kg            Admission Status:     Inpatient -                                                               Routine BSA / BMI:          1.57 m2 / 19.71     Encounter#:           [id number]                     kg/m2 Blood Pressure:     105/69 mmHg         Department Location:  [facility]                                                               Invasive Study Type:    TRANSTHORACIC ECHO (TTE) COMPLETE Diagnosis/ICD: Saddle embolus of pulmonary artery with acute cor                pulmonale-I26.02 Indication:    Pulmonary Embolism CPT Code:      Echo Complete w Full Doppler-51688 Patient History: Diabetes:          Yes Pertinent History: PE and Hyperlipidemia. Study Detail: The following Echo studies were performed: 2D, M-Mode, Doppler and               color flow.  PHYSICIAN  INTERPRETATION: Left Ventricle: Left ventricular ejection fraction is normal, by visual estimate at 65-70%. There are no regional left ventricular wall motion abnormalities. The left ventricular cavity size is normal. There is normal septal and mildly increased posterior left ventricular wall thickness. The interventricular septum is flattened in systole and diastole, consistent with right ventricular pressure and volume overload. Left ventricular diastolic filling cannot be determined and left atrial filling pressure cannot be determined, due to E/A wave fusion. Left Atrium: The left atrium is normal in size. Right Ventricle: The right ventricle is severely enlarged. There is reduced right ventricular systolic function. Right Atrium: The right atrium is severely dilated. Aortic Valve: The aortic valve is trileaflet. The aortic valve dimensionless index is 0.80. There is no evidence of aortic valve regurgitation. The peak instantaneous gradient of the aortic valve is 5 mmHg. The mean gradient of the mitral valve is 3 mmHg. Mitral Valve: The mitral valve is normal in structure. There is no evidence of mitral valve regurgitation. Tricuspid Valve: The tricuspid valve is structurally normal. There is moderate tricuspid regurgitation. The Doppler estimated RVSP is severely elevated at 101.8 mmHg. Pulmonic Valve: The pulmonic valve is structurally normal. There is mild to moderate pulmonic valve regurgitation. Pericardium: Moderate pericardial effusion. Aorta: The aortic root is normal. Systemic Veins: The inferior vena cava appears normal in size, with IVC inspiratory collapse less than 50%. In comparison to the previous echocardiogram(s): Compared with study dated 9/12/2024, the RVSP has increased from 81mmHg to 101mmHg.  CONCLUSIONS:  1. Left ventricular ejection fraction is normal, by visual estimate at 65-70%.  2. Left ventricular diastolic filling cannot be determined and left atrial filling pressure cannot be  determined, due to E/A wave fusion.  3. Right ventricular volume and pressure overload.  4. There is reduced right ventricular systolic function.  5. Severely enlarged right ventricle.  6. The right atrium is severely dilated.  7. Moderate pericardial effusion.  8. Moderate tricuspid regurgitation visualized.  9. Severely elevated right ventricular systolic pressure. 10. There is mild to moderate pulmonic valve regurgitation. 11. Compared with study dated 9/12/2024, the RVSP has increased from 81mmHg to 101mmHg. QUANTITATIVE DATA SUMMARY:  2D MEASUREMENTS:          Normal Ranges: LAs:             2.95 cm  (2.7-4.0cm) IVSd:            0.77 cm  (0.6-1.1cm) LVPWd:           0.92 cm  (0.6-1.1cm) LVIDd:           4.00 cm  (3.9-5.9cm) LVIDs:           2.26 cm LV Mass Index:   65 g/m2 LVEDV Index:     24 ml/m2 LV % FS          43.7 %  LA VOLUME:                    Normal Ranges: LA Vol A4C:        33.8 ml    (22+/-6mL/m2) LA Vol A2C:        30.7 ml LA Vol BP:         36.6 ml LA Vol Index A4C:  21.6ml/m2 LA Vol Index A2C:  19.6 ml/m2 LA Vol Index BP:   23.4 ml/m2 LA Area A4C:       12.3 cm2 LA Area A2C:       13.3 cm2 LA Major Axis A4C: 3.8 cm LA Major Axis A2C: 4.9 cm LA Volume Index:   23.3 ml/m2 LA Vol A4C:        31.7 ml LA Vol A2C:        29.6 ml LA Vol Index BSA:  19.6 ml/m2  RA VOLUME BY A/L METHOD:            Normal Ranges: RA Vol A4C:              118.5 ml   (8.3-19.5ml) RA Vol Index A4C:        75.6 ml/m2 RA Area A4C:             29.4 cm2 RA Major Axis A4C:       6.2 cm  AORTA MEASUREMENTS:         Normal Ranges: Ao Sinus, d:        3.50 cm (2.1-3.5cm) Ao STJ, d:          2.50 cm (1.7-3.4cm) Asc Ao, d:          3.10 cm (2.1-3.4cm)  LV SYSTOLIC FUNCTION BY 2D PLANIMETRY (MOD):                      Normal Ranges: EF-A4C View:    74 % (>=55%) EF-A2C View:    69 % EF-Biplane:     71 % EF-Visual:      68 % LV EF Reported: 68 %  LV DIASTOLIC FUNCTION:            Normal Ranges: MV Peak E:             0.51 m/s   (0.7-1.2  m/s) MV Peak A:             0.53 m/s   (0.42-0.7 m/s) E/A Ratio:             0.97       (1.0-2.2) MV e'                  0.065 m/s  (>8.0) MV lateral e'          0.06 m/s MV medial e'           0.07 m/s MV A Dur:              64.59 msec E/e' Ratio:            7.91       (<8.0) a'                     0.17 m/s  MITRAL VALVE:         Normal Ranges: MV DT:        85 msec (150-240msec)  AORTIC VALVE:                     Normal Ranges: AoV Vmax:                1.15 m/s (<=1.7m/s) AoV Peak P.3 mmHg (<20mmHg) AoV Mean PG:             3.4 mmHg (1.7-11.5mmHg) LVOT Max Marc:            0.87 m/s (<=1.1m/s) AoV VTI:                 12.89 cm (18-25cm) LVOT VTI:                10.32 cm LVOT Diameter:           1.99 cm  (1.8-2.4cm) AoV Area, VTI:           2.49 cm2 (2.5-5.5cm2) AoV Area,Vmax:           2.35 cm2 (2.5-4.5cm2) AoV Dimensionless Index: 0.80  RIGHT VENTRICLE: RV Basal 4.70 cm RV Mid   4.00 cm RV Major 6.7 cm TAPSE:   15.0 mm RV s'    0.18 m/s  TRICUSPID VALVE/RVSP:          Normal Ranges: Peak TR Velocity:     4.84 m/s Est. RA Pressure:     8 mmHg RV Syst Pressure:     102 mmHg (< 30mmHg) IVC Diam:             1.60 cm  PULMONIC VALVE:          Normal Ranges: RVOT Vmax:      0.40 m/s (0.6-0.9m/s) PV Max Marc:     0.7 m/s  (0.6-0.9m/s) PV Max P.8 mmHg  AORTA: Asc Ao Diam 3.07 cm  [id number] Maximo Gomez MD Electronically signed on 2024 at 1:48:01 PM  ** Final **     Electrocardiogram, 12-lead PRN ACS symptoms    Result Date: 2024  Sinus tachycardia Biatrial enlargement Right axis deviation Pulmonary disease pattern Abnormal ECG When compared with ECG of 12-SEP-2024 03:34, No significant change was found    CT abdomen pelvis w IV contrast    Result Date: 10/31/2024  Interpreted By:  Krzysztof Candelario, STUDY: CT ABDOMEN PELVIS W IV CONTRAST;  10/31/2024 10:01 pm   INDICATION: Signs/Symptoms:abdominal pain, nausea, diarrhea.     COMPARISON: 2019   ACCESSION NUMBER(S): [id number]    ORDERING CLINICIAN: KAYLEEN ENGLAND   TECHNIQUE: Contiguous axial images of the abdomen and pelvis were obtained after the intravenous administration of iodinated contrast. Coronal and sagittal reformatted images were reconstructed from the axial data.   FINDINGS: LOWER CHEST: See separate report.     ABDOMEN/PELVIS:   ABDOMINAL WALL: No significant abnormality.   LIVER: There is enlarged measuring 20.3 cm in length. There are no focal parenchymal abnormalities. There is mild periportal edema.   BILE DUCTS: No significant intrahepatic or extrahepatic dilatation.   GALLBLADDER: No significant abnormality.   PANCREAS: No significant abnormality.   SPLEEN: No significant abnormality.   ADRENALS: No significant abnormality.   KIDNEYS, URETERS, BLADDER: No significant abnormality.   REPRODUCTIVE ORGANS: No significant abnormality.   VESSELS: Mild aortic atherosclerosis without AAA.   RETROPERITONEUM/LYMPH NODES: No acute retroperitoneal abnormality. Numerous reactive pericolonic lymph nodes are noted.   BOWEL/MESENTERY/PERITONEUM: There is diffuse inflammation and wall thickening of the rectum, sigmoid colon, descending colon, transverse colon, and majority of the ascending colon. This is most pronounced in the mid transverse colon and there is relative sparing of the cecum. This is progressed in the transverse and ascending colon compared to prior study. The colon demonstrates diffuse featureless appearance through the transverse, descending, and sigmoid colon and rectum. There is relative preservation of haustral anatomy in the ascending colon. There are no fistulas or other penetrating disease. The colon is nondilated. The small bowel is noninflamed and nondilated. The stomach appears within normal limits. The appendix is not identified; however, there are no pericecal inflammatory changes.   No ascites, free air, or fluid collection.     MUSCULOSKELETAL: No acute osseous abnormality. No suspicious osseous lesion.        Diffuse active inflammation of the colon contiguously extending from the rectum to the ascending colon which is significantly progressed in the transverse colon, again demonstrating characteristic featureless appearance consistent with known ulcerative colitis.   No other acute abnormalities in the abdomen or pelvis.   Please see separate report of the chest for pulmonary findings.   MACRO: None.   Signed by: Krzysztof Candelario 10/31/2024 10:50 PM Dictation workstation:   [id number]    CT angio chest for pulmonary embolism    Result Date: 10/31/2024  Interpreted By:  Krzysztof Candelario, STUDY: CT ANGIO CHEST FOR PULMONARY EMBOLISM;  10/31/2024 10:01 pm   INDICATION: Signs/Symptoms:hx of pe, tachycardia.     COMPARISON: 09/12/2024, 10/20/2021 CT PE chest   ACCESSION NUMBER(S): [id number]   ORDERING CLINICIAN: KAYLEEN ENGLAND   TECHNIQUE: Contiguous axial images of the chest were obtained after the intravenous administration of iodinated contrast using angiographic PE protocol. Coronal and sagittal reformatted images were reconstructed from the axial data. MIP images were created on an independent workstation and reviewed.   FINDINGS:   MEDIASTINUM AND LYMPH NODES:  The esophageal wall appears within normal limits.  No enlarged intrathoracic or axillary lymph nodes by imaging criteria. No pneumomediastinum.   VESSELS:  Normal caliber thoracic aorta without dissection. No significant aortic atherosclerosis. There is a large completely occlusive thrombus within the right pulmonary artery the. This was present on prior study but has significantly increased in size and proximal extent toward the right/left pulmonary artery bifurcation. Specifically, it extends 0.9 cm from the bifurcation today, previously 2.7 cm from the bifurcation. All of the pulmonary artery branches in the right lung are completely occluded. Additionally, the main pulmonary artery has increased and dilatation compared to 09/12/2024, currently 3.9  cm (previously 3.7 cm). It is significantly more dilated compared to 10/20/2021 (3.4 cm) at which time there are tiny segmental pulmonary emboli in the right lower lobe. There is also worsening diffuse dilatation of the pulmonary arteries in the left lung compared to 10/28/2021, including the left pulmonary artery which measures 2.5 cm, previously 2.3 cm on 10/20/2021.   No left-sided pulmonary emboli.   HEART: There is disproportionate dilatation of the right ventricle with bowing of the interventricular septum toward the left ventricle resulting in an RV-LV ratio of 1.65. This is progressed from prior study where the RV-LV ratio is approximately 1.3. the right atrium is also mildly dilated. No coronary artery calcifications. Unchanged moderate pericardial effusion.   LUNG, AIRWAYS, AND PLEURA: There diffuse oligemic hyperlucency of the right lung. There is diffuse interlobular septal thickening in the right lower lobe similar to prior study. There is mosaic perfusion involving the left lung due to chronic thromboembolic disease similar to prior study. There is scattered centrilobular nodules in the right lung likely due to chronic thromboembolic disease. There is scattered scarring and atelectasis bilaterally. There are no pleural effusions or pneumothorax.   OSSEOUS STRUCTURES: No acute osseous abnormality.   CHEST WALL SOFT TISSUES: No discernible abnormality.   UPPER ABDOMEN/OTHER: Please see separate report.       1. Enlargement of pre-existing completely occlusive thrombus within the right main pulmonary artery which now extends to the < 1 cm from the right/left pulmonary artery bifurcation, previously was 2.7 cm from the bifurcation. Worsening of main pulmonary artery dilatation worsening of right heart strain compared to 09/12/2024 and significantly greater compared to 10/20/2021. Patient is undergone attempted mechanical thrombectomy at outside institution on (05/15/2024) demonstrating extensive chronic  clot that was difficult to remove per outside note.   2. Over course of prior studies, there is also continued worsening dilatation of the left-sided pulmonary arteries due to chronic thromboembolic pulmonary hypertension.   3. Diffuse moderate pericardial effusion   MACRO: Krzysztof Candelario discussed the significance and urgency of this critical finding via [facility] with Dr. Elizondo  on 10/31/2024 at 10:32 pm.  (**-RCF-**) Findings:  See findings.   Signed by: Krzysztof Candelario 10/31/2024 10:44 PM Dictation workstation:   [id number]                 Assessment/Plan   Assessment & Plan  Diarrhea, unspecified type      Elise Chávez is a 49 y.o. 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   GI was consulted for management of ulcerative colitis.  Infectious stool workup came back negative.    -Diet as tolerated  -Recommend IV steroids 20 mg Solu-Medrol IV every 8 hours and then switch to prednisone taper  -Calprotectin pending  -Recommend outpatient follow-up with GI for colonoscopy and IBD specialist for further management of ulcerative colitis.  Patient was offered to undergo flex sig but declined  -GI will follow    MARILU Sepulveda-CNP

## 2024-11-02 NOTE — CARE PLAN
The patient's goals for the shift include receiving IV steroids.     The clinical goals for the shift include Pt will not have any nausea this shift      Problem: Pain - Adult  Goal: Verbalizes/displays adequate comfort level or baseline comfort level  Outcome: Progressing     Problem: Safety - Adult  Goal: Free from fall injury  Outcome: Progressing     Problem: Discharge Planning  Goal: Discharge to home or other facility with appropriate resources  Outcome: Progressing     Problem: Chronic Conditions and Co-morbidities  Goal: Patient's chronic conditions and co-morbidity symptoms are monitored and maintained or improved  Outcome: Progressing

## 2024-11-02 NOTE — PROGRESS NOTES
Elise Chávez is a 49 y.o. female on day 1 of admission presenting with Diarrhea, unspecified type.    Subjective   Patient seen at bedside, reports she is feeling significantly better with a lower HR and resolution of her palpitations. She states her breathing feels well overall. She is starting to have improved abdominal pain and appetite, however she did have an episode of vomiting this morning after getting a dose of PO prednisone.        Objective     Physical Exam  Constitutional:       General: She is not in acute distress.     Appearance: Normal appearance. She is not toxic-appearing.   HENT:      Head: Normocephalic and atraumatic.      Nose: No rhinorrhea.      Mouth/Throat:      Mouth: Mucous membranes are moist.   Eyes:      Extraocular Movements: Extraocular movements intact.      Conjunctiva/sclera: Conjunctivae normal.      Pupils: Pupils are equal, round, and reactive to light.   Cardiovascular:      Rate and Rhythm: Regular rhythm. Tachycardia present.      Heart sounds: No murmur heard.     No friction rub. Gallop present.   Pulmonary:      Effort: Pulmonary effort is normal.      Breath sounds: Normal breath sounds. No wheezing, rhonchi or rales.   Abdominal:      General: There is no distension.      Palpations: Abdomen is soft.      Tenderness: There is abdominal tenderness (RUQ and epigastric). There is no guarding or rebound.   Musculoskeletal:      Cervical back: Normal range of motion.      Right lower leg: No edema.      Left lower leg: No edema.   Skin:     General: Skin is warm and dry.   Neurological:      General: No focal deficit present.      Mental Status: She is alert and oriented to person, place, and time.   Psychiatric:         Mood and Affect: Mood normal.         Behavior: Behavior normal.         Thought Content: Thought content normal.         Last Recorded Vitals  Blood pressure 105/73, pulse (!) 113, temperature 37 °C (98.6 °F), temperature source Temporal, resp. rate 18,  "height 1.626 m (5' 4\"), weight 52.6 kg (116 lb), SpO2 95%.  Intake/Output last 3 Shifts:  I/O last 3 completed shifts:  In: 90.6 (1.7 mL/kg) [I.V.:90.6 (1.7 mL/kg)]  Out: 100 (1.9 mL/kg) [Urine:100 (0.1 mL/kg/hr)]  Weight: 52.6 kg     Relevant Results                   Results for orders placed or performed during the hospital encounter of 10/31/24 (from the past 24 hours)   Basic metabolic panel   Result Value Ref Range    Glucose 107 (H) 74 - 99 mg/dL    Sodium 136 136 - 145 mmol/L    Potassium 3.8 3.5 - 5.3 mmol/L    Chloride 104 98 - 107 mmol/L    Bicarbonate 24 21 - 32 mmol/L    Anion Gap 12 10 - 20 mmol/L    Urea Nitrogen 10 6 - 23 mg/dL    Creatinine 0.63 0.50 - 1.05 mg/dL    eGFR >90 >60 mL/min/1.73m*2    Calcium 8.1 (L) 8.6 - 10.3 mg/dL   Heparin Assay, UFH   Result Value Ref Range    Heparin Unfractionated 0.3 See Comment Below for Therapeutic Ranges IU/mL   Troponin I, High Sensitivity   Result Value Ref Range    Troponin I, High Sensitivity 14 (H) 0 - 13 ng/L   Heparin Assay, UFH   Result Value Ref Range    Heparin Unfractionated 0.3 See Comment Below for Therapeutic Ranges IU/mL   Basic metabolic panel   Result Value Ref Range    Glucose 121 (H) 74 - 99 mg/dL    Sodium 136 136 - 145 mmol/L    Potassium 3.7 3.5 - 5.3 mmol/L    Chloride 104 98 - 107 mmol/L    Bicarbonate 25 21 - 32 mmol/L    Anion Gap 11 10 - 20 mmol/L    Urea Nitrogen 9 6 - 23 mg/dL    Creatinine 0.56 0.50 - 1.05 mg/dL    eGFR >90 >60 mL/min/1.73m*2    Calcium 8.1 (L) 8.6 - 10.3 mg/dL   Heparin Assay, UFH   Result Value Ref Range    Heparin Unfractionated 0.2 See Comment Below for Therapeutic Ranges IU/mL   CBC   Result Value Ref Range    WBC 24.0 (H) 4.4 - 11.3 x10*3/uL    nRBC 0.0 0.0 - 0.0 /100 WBCs    RBC 4.20 4.00 - 5.20 x10*6/uL    Hemoglobin 10.7 (L) 12.0 - 16.0 g/dL    Hematocrit 33.0 (L) 36.0 - 46.0 %    MCV 79 (L) 80 - 100 fL    MCH 25.5 (L) 26.0 - 34.0 pg    MCHC 32.4 32.0 - 36.0 g/dL    RDW 19.3 (H) 11.5 - 14.5 %    Platelets " 1,340 (H) 150 - 450 x10*3/uL   Heparin Assay, UFH   Result Value Ref Range    Heparin Unfractionated 0.4 See Comment Below for Therapeutic Ranges IU/mL     Transthoracic Echo (TTE) Complete    Result Date: 11/1/2024   [facility], [address] [place] [address]              Tel [phone] and Fax [phone] TRANSTHORACIC ECHOCARDIOGRAM REPORT  Patient Name:       IVKI Sosa Physician:    [id number] Maximo Gomez MD Study Date:         11/1/2024           Ordering Provider:    [id number] GAYLE CARMONA MRN/PID:            [id number]            Fellow: Accession#:         [id number]        Nurse: Date of Birth/Age:  1975 / 49      Sonographer:          Coral Horner RDCS                     years Gender assigned at  F                   Additional Staff: Birth: Height:             164.00 cm           Admit Date:           10/31/2024 Weight:             53.00 kg            Admission Status:     Inpatient -                                                               Routine BSA / BMI:          1.57 m2 / 19.71     Encounter#:           [id number]                     kg/m2 Blood Pressure:     105/69 mmHg         Department Location:  [facility] Non                                                               Invasive Study Type:    TRANSTHORACIC ECHO (TTE) COMPLETE Diagnosis/ICD: Saddle embolus of pulmonary artery with acute cor                pulmonale-I26.02 Indication:    Pulmonary Embolism CPT Code:      Echo Complete w Full Doppler-11006 Patient History: Diabetes:          Yes Pertinent History: PE and Hyperlipidemia. Study Detail: The following Echo studies were performed: 2D, M-Mode, Doppler and               color flow.  PHYSICIAN INTERPRETATION: Left Ventricle: Left ventricular ejection fraction is normal, by visual estimate at 65-70%. There are no  regional left ventricular wall motion abnormalities. The left ventricular cavity size is normal. There is normal septal and mildly increased posterior left ventricular wall thickness. The interventricular septum is flattened in systole and diastole, consistent with right ventricular pressure and volume overload. Left ventricular diastolic filling cannot be determined and left atrial filling pressure cannot be determined, due to E/A wave fusion. Left Atrium: The left atrium is normal in size. Right Ventricle: The right ventricle is severely enlarged. There is reduced right ventricular systolic function. Right Atrium: The right atrium is severely dilated. Aortic Valve: The aortic valve is trileaflet. The aortic valve dimensionless index is 0.80. There is no evidence of aortic valve regurgitation. The peak instantaneous gradient of the aortic valve is 5 mmHg. The mean gradient of the mitral valve is 3 mmHg. Mitral Valve: The mitral valve is normal in structure. There is no evidence of mitral valve regurgitation. Tricuspid Valve: The tricuspid valve is structurally normal. There is moderate tricuspid regurgitation. The Doppler estimated RVSP is severely elevated at 101.8 mmHg. Pulmonic Valve: The pulmonic valve is structurally normal. There is mild to moderate pulmonic valve regurgitation. Pericardium: Moderate pericardial effusion. Aorta: The aortic root is normal. Systemic Veins: The inferior vena cava appears normal in size, with IVC inspiratory collapse less than 50%. In comparison to the previous echocardiogram(s): Compared with study dated 9/12/2024, the RVSP has increased from 81mmHg to 101mmHg.  CONCLUSIONS:  1. Left ventricular ejection fraction is normal, by visual estimate at 65-70%.  2. Left ventricular diastolic filling cannot be determined and left atrial filling pressure cannot be determined, due to E/A wave fusion.  3. Right ventricular volume and pressure overload.  4. There is reduced right ventricular  systolic function.  5. Severely enlarged right ventricle.  6. The right atrium is severely dilated.  7. Moderate pericardial effusion.  8. Moderate tricuspid regurgitation visualized.  9. Severely elevated right ventricular systolic pressure. 10. There is mild to moderate pulmonic valve regurgitation. 11. Compared with study dated 9/12/2024, the RVSP has increased from 81mmHg to 101mmHg. QUANTITATIVE DATA SUMMARY:  2D MEASUREMENTS:          Normal Ranges: LAs:             2.95 cm  (2.7-4.0cm) IVSd:            0.77 cm  (0.6-1.1cm) LVPWd:           0.92 cm  (0.6-1.1cm) LVIDd:           4.00 cm  (3.9-5.9cm) LVIDs:           2.26 cm LV Mass Index:   65 g/m2 LVEDV Index:     24 ml/m2 LV % FS          43.7 %  LA VOLUME:                    Normal Ranges: LA Vol A4C:        33.8 ml    (22+/-6mL/m2) LA Vol A2C:        30.7 ml LA Vol BP:         36.6 ml LA Vol Index A4C:  21.6ml/m2 LA Vol Index A2C:  19.6 ml/m2 LA Vol Index BP:   23.4 ml/m2 LA Area A4C:       12.3 cm2 LA Area A2C:       13.3 cm2 LA Major Axis A4C: 3.8 cm LA Major Axis A2C: 4.9 cm LA Volume Index:   23.3 ml/m2 LA Vol A4C:        31.7 ml LA Vol A2C:        29.6 ml LA Vol Index BSA:  19.6 ml/m2  RA VOLUME BY A/L METHOD:            Normal Ranges: RA Vol A4C:              118.5 ml   (8.3-19.5ml) RA Vol Index A4C:        75.6 ml/m2 RA Area A4C:             29.4 cm2 RA Major Axis A4C:       6.2 cm  AORTA MEASUREMENTS:         Normal Ranges: Ao Sinus, d:        3.50 cm (2.1-3.5cm) Ao STJ, d:          2.50 cm (1.7-3.4cm) Asc Ao, d:          3.10 cm (2.1-3.4cm)  LV SYSTOLIC FUNCTION BY 2D PLANIMETRY (MOD):                      Normal Ranges: EF-A4C View:    74 % (>=55%) EF-A2C View:    69 % EF-Biplane:     71 % EF-Visual:      68 % LV EF Reported: 68 %  LV DIASTOLIC FUNCTION:            Normal Ranges: MV Peak E:             0.51 m/s   (0.7-1.2 m/s) MV Peak A:             0.53 m/s   (0.42-0.7 m/s) E/A Ratio:             0.97       (1.0-2.2) MV e'                  0.065  m/s  (>8.0) MV lateral e'          0.06 m/s MV medial e'           0.07 m/s MV A Dur:              64.59 msec E/e' Ratio:            7.91       (<8.0) a'                     0.17 m/s  MITRAL VALVE:         Normal Ranges: MV DT:        85 msec (150-240msec)  AORTIC VALVE:                     Normal Ranges: AoV Vmax:                1.15 m/s (<=1.7m/s) AoV Peak P.3 mmHg (<20mmHg) AoV Mean PG:             3.4 mmHg (1.7-11.5mmHg) LVOT Max Marc:            0.87 m/s (<=1.1m/s) AoV VTI:                 12.89 cm (18-25cm) LVOT VTI:                10.32 cm LVOT Diameter:           1.99 cm  (1.8-2.4cm) AoV Area, VTI:           2.49 cm2 (2.5-5.5cm2) AoV Area,Vmax:           2.35 cm2 (2.5-4.5cm2) AoV Dimensionless Index: 0.80  RIGHT VENTRICLE: RV Basal 4.70 cm RV Mid   4.00 cm RV Major 6.7 cm TAPSE:   15.0 mm RV s'    0.18 m/s  TRICUSPID VALVE/RVSP:          Normal Ranges: Peak TR Velocity:     4.84 m/s Est. RA Pressure:     8 mmHg RV Syst Pressure:     102 mmHg (< 30mmHg) IVC Diam:             1.60 cm  PULMONIC VALVE:          Normal Ranges: RVOT Vmax:      0.40 m/s (0.6-0.9m/s) PV Max Marc:     0.7 m/s  (0.6-0.9m/s) PV Max P.8 mmHg  AORTA: Asc Ao Diam 3.07 cm  [id number] Maximo Gomez MD Electronically signed on 2024 at 1:48:01 PM  ** Final **     Electrocardiogram, 12-lead PRN ACS symptoms    Result Date: 2024  Sinus tachycardia Biatrial enlargement Right axis deviation Pulmonary disease pattern Abnormal ECG When compared with ECG of 12-SEP-2024 03:34, No significant change was found    CT abdomen pelvis w IV contrast    Result Date: 10/31/2024  Interpreted By:  Krzysztof Candelario, STUDY: CT ABDOMEN PELVIS W IV CONTRAST;  10/31/2024 10:01 pm   INDICATION: Signs/Symptoms:abdominal pain, nausea, diarrhea.     COMPARISON: 2019   ACCESSION NUMBER(S): [id number]   ORDERING CLINICIAN: KAYLEEN ENGLAND   TECHNIQUE: Contiguous axial images of the abdomen and pelvis were obtained after the  intravenous administration of iodinated contrast. Coronal and sagittal reformatted images were reconstructed from the axial data.   FINDINGS: LOWER CHEST: See separate report.     ABDOMEN/PELVIS:   ABDOMINAL WALL: No significant abnormality.   LIVER: There is enlarged measuring 20.3 cm in length. There are no focal parenchymal abnormalities. There is mild periportal edema.   BILE DUCTS: No significant intrahepatic or extrahepatic dilatation.   GALLBLADDER: No significant abnormality.   PANCREAS: No significant abnormality.   SPLEEN: No significant abnormality.   ADRENALS: No significant abnormality.   KIDNEYS, URETERS, BLADDER: No significant abnormality.   REPRODUCTIVE ORGANS: No significant abnormality.   VESSELS: Mild aortic atherosclerosis without AAA.   RETROPERITONEUM/LYMPH NODES: No acute retroperitoneal abnormality. Numerous reactive pericolonic lymph nodes are noted.   BOWEL/MESENTERY/PERITONEUM: There is diffuse inflammation and wall thickening of the rectum, sigmoid colon, descending colon, transverse colon, and majority of the ascending colon. This is most pronounced in the mid transverse colon and there is relative sparing of the cecum. This is progressed in the transverse and ascending colon compared to prior study. The colon demonstrates diffuse featureless appearance through the transverse, descending, and sigmoid colon and rectum. There is relative preservation of haustral anatomy in the ascending colon. There are no fistulas or other penetrating disease. The colon is nondilated. The small bowel is noninflamed and nondilated. The stomach appears within normal limits. The appendix is not identified; however, there are no pericecal inflammatory changes.   No ascites, free air, or fluid collection.     MUSCULOSKELETAL: No acute osseous abnormality. No suspicious osseous lesion.       Diffuse active inflammation of the colon contiguously extending from the rectum to the ascending colon which is  significantly progressed in the transverse colon, again demonstrating characteristic featureless appearance consistent with known ulcerative colitis.   No other acute abnormalities in the abdomen or pelvis.   Please see separate report of the chest for pulmonary findings.   MACRO: None.   Signed by: Krzysztof Candelario 10/31/2024 10:50 PM Dictation workstation:   [id number]    CT angio chest for pulmonary embolism    Result Date: 10/31/2024  Interpreted By:  Krzysztof Candelario, STUDY: CT ANGIO CHEST FOR PULMONARY EMBOLISM;  10/31/2024 10:01 pm   INDICATION: Signs/Symptoms:hx of pe, tachycardia.     COMPARISON: 09/12/2024, 10/20/2021 CT PE chest   ACCESSION NUMBER(S): [id number]   ORDERING CLINICIAN: KAYLEEN ENGLAND   TECHNIQUE: Contiguous axial images of the chest were obtained after the intravenous administration of iodinated contrast using angiographic PE protocol. Coronal and sagittal reformatted images were reconstructed from the axial data. MIP images were created on an independent workstation and reviewed.   FINDINGS:   MEDIASTINUM AND LYMPH NODES:  The esophageal wall appears within normal limits.  No enlarged intrathoracic or axillary lymph nodes by imaging criteria. No pneumomediastinum.   VESSELS:  Normal caliber thoracic aorta without dissection. No significant aortic atherosclerosis. There is a large completely occlusive thrombus within the right pulmonary artery the. This was present on prior study but has significantly increased in size and proximal extent toward the right/left pulmonary artery bifurcation. Specifically, it extends 0.9 cm from the bifurcation today, previously 2.7 cm from the bifurcation. All of the pulmonary artery branches in the right lung are completely occluded. Additionally, the main pulmonary artery has increased and dilatation compared to 09/12/2024, currently 3.9 cm (previously 3.7 cm). It is significantly more dilated compared to 10/20/2021 (3.4 cm) at which time there are tiny  segmental pulmonary emboli in the right lower lobe. There is also worsening diffuse dilatation of the pulmonary arteries in the left lung compared to 10/28/2021, including the left pulmonary artery which measures 2.5 cm, previously 2.3 cm on 10/20/2021.   No left-sided pulmonary emboli.   HEART: There is disproportionate dilatation of the right ventricle with bowing of the interventricular septum toward the left ventricle resulting in an RV-LV ratio of 1.65. This is progressed from prior study where the RV-LV ratio is approximately 1.3. the right atrium is also mildly dilated. No coronary artery calcifications. Unchanged moderate pericardial effusion.   LUNG, AIRWAYS, AND PLEURA: There diffuse oligemic hyperlucency of the right lung. There is diffuse interlobular septal thickening in the right lower lobe similar to prior study. There is mosaic perfusion involving the left lung due to chronic thromboembolic disease similar to prior study. There is scattered centrilobular nodules in the right lung likely due to chronic thromboembolic disease. There is scattered scarring and atelectasis bilaterally. There are no pleural effusions or pneumothorax.   OSSEOUS STRUCTURES: No acute osseous abnormality.   CHEST WALL SOFT TISSUES: No discernible abnormality.   UPPER ABDOMEN/OTHER: Please see separate report.       1. Enlargement of pre-existing completely occlusive thrombus within the right main pulmonary artery which now extends to the < 1 cm from the right/left pulmonary artery bifurcation, previously was 2.7 cm from the bifurcation. Worsening of main pulmonary artery dilatation worsening of right heart strain compared to 09/12/2024 and significantly greater compared to 10/20/2021. Patient is undergone attempted mechanical thrombectomy at outside institution on (05/15/2024) demonstrating extensive chronic clot that was difficult to remove per outside note.   2. Over course of prior studies, there is also continued worsening  dilatation of the left-sided pulmonary arteries due to chronic thromboembolic pulmonary hypertension.   3. Diffuse moderate pericardial effusion   MACRO: Krzysztof Candelario discussed the significance and urgency of this critical finding [facility] El[facility]  on 10/31/2024 at 10:32 pm.  (**-RCF-**) Findings:  See findings.   Signed by: Krzysztof Candelario 10/31/2024 10:44 PM Dictation workstation:   [id number]              Assessment/Plan   Assessment & Plan  Diarrhea, unspecified type    Elise Chávez is a 49 y.o. 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 CTPE showed ?propagation of the right main PA clot. Pulmonary is consulted for PE with RV strain.     Ms. Chávez is having continued significant R main PA chronic PE with signs of worsening CTEPH with RV failure. Her tachycardia is still present but improving overall on anticoagulation and with steroids for colitis.      #Chronic PE with R PA occlusion  #CTEPH  #RV failure  #ET on Besremi  #UC in acute flare vs infection     Recommendations:  -Discussed with Dr. Michael Aguirre, transcatheter treatments are unlikely to be very effective for her chronic clots as she has significant calcified chronic Pes and is likely to clot again quickly. [place] [facility] [place] about surgical endarterectomy. I discussed with Ms. Chávez, she does not wish for transfer or surgical evaluation at this time as she is aware that she will clot again with her platelets being so high. She wishes to continue her Besremi to help with her ET and focus on treating her UC flare prior to discussing surgical interventions. Discussed with Ms. Chávez yesterday and  today that I am concerned about her hemodynamics and RV dysfunction and that if the clot propagates, this could lead to worsening RV failure and potential for death. She is aware of this but wishes to treat her ET to reduce her surgical risk and reduce the risk of repeat thrombosis before undergoing that type of surgery. Other concerns include insurance/work/financial burdens right now. She is agreeable to follow up with me and possible with interventional cardiology outpatient.   -Continue high intensity heparin ggt, appreciate heme input regarding anticoagulation  -Appreciate GI input regarding UC flare       I spent 50 minutes in the professional and overall care of this patient.      Michelle Giordano, DO       0

## 2024-11-02 NOTE — CARE PLAN
The patient's goals for the shift include  decrease diarrhea    The clinical goals for the shift include patient will remain free of falls during shift      Problem: Pain - Adult  Goal: Verbalizes/displays adequate comfort level or baseline comfort level  Outcome: Progressing     Problem: Safety - Adult  Goal: Free from fall injury  Outcome: Progressing     Problem: Discharge Planning  Goal: Discharge to home or other facility with appropriate resources  Outcome: Progressing     Problem: Chronic Conditions and Co-morbidities  Goal: Patient's chronic conditions and co-morbidity symptoms are monitored and maintained or improved  Outcome: Progressing

## 2024-11-02 NOTE — NURSING NOTE
The following patient has a RADAR score of 7 Unit: AHUA6, Room: [address] [place] [address], T: 36.7 °C (98.1 °F) (Temporal), P: (!) 136, R: 20, BP: 95/63, PulseOx: 93%.    Spoke with bedside RN, patient refusing medications at this time. Bedside RN has notified primary team of patient's vitals and patient's refusal of prescribed antibiotics. No further interventions at this time. Bedside RN to escalate any concerns to primary team.       [facility]  Rapid Response Team

## 2024-11-03 VITALS
HEIGHT: 64 IN | RESPIRATION RATE: 18 BRPM | HEART RATE: 113 BPM | OXYGEN SATURATION: 97 % | BODY MASS INDEX: 19.81 KG/M2 | TEMPERATURE: 99 F | SYSTOLIC BLOOD PRESSURE: 129 MMHG | WEIGHT: 116 LBS | DIASTOLIC BLOOD PRESSURE: 85 MMHG

## 2024-11-03 LAB
ANION GAP SERPL CALC-SCNC: 9 MMOL/L (ref 10–20)
BACTERIA BLD CULT: NORMAL
BACTERIA BLD CULT: NORMAL
BUN SERPL-MCNC: 15 MG/DL (ref 6–23)
CALCIUM SERPL-MCNC: 8.1 MG/DL (ref 8.6–10.3)
CHLORIDE SERPL-SCNC: 107 MMOL/L (ref 98–107)
CO2 SERPL-SCNC: 27 MMOL/L (ref 21–32)
CREAT SERPL-MCNC: 0.59 MG/DL (ref 0.5–1.05)
EGFRCR SERPLBLD CKD-EPI 2021: >90 ML/MIN/1.73M*2
ERYTHROCYTE [DISTWIDTH] IN BLOOD BY AUTOMATED COUNT: 19.6 % (ref 11.5–14.5)
GLUCOSE SERPL-MCNC: 116 MG/DL (ref 74–99)
HCT VFR BLD AUTO: 31 % (ref 36–46)
HGB BLD-MCNC: 10 G/DL (ref 12–16)
MCH RBC QN AUTO: 25.1 PG (ref 26–34)
MCHC RBC AUTO-ENTMCNC: 32.3 G/DL (ref 32–36)
MCV RBC AUTO: 78 FL (ref 80–100)
NRBC BLD-RTO: 0 /100 WBCS (ref 0–0)
PLATELET # BLD AUTO: 1208 X10*3/UL (ref 150–450)
POTASSIUM SERPL-SCNC: 3.9 MMOL/L (ref 3.5–5.3)
RBC # BLD AUTO: 3.99 X10*6/UL (ref 4–5.2)
SODIUM SERPL-SCNC: 139 MMOL/L (ref 136–145)
UFH PPP CHRO-ACNC: 0.3 IU/ML
WBC # BLD AUTO: 24.3 X10*3/UL (ref 4.4–11.3)

## 2024-11-03 PROCEDURE — 85520 HEPARIN ASSAY: CPT | Performed by: EMERGENCY MEDICINE

## 2024-11-03 PROCEDURE — 2500000004 HC RX 250 GENERAL PHARMACY W/ HCPCS (ALT 636 FOR OP/ED): Mod: JZ | Performed by: NURSE PRACTITIONER

## 2024-11-03 PROCEDURE — 2500000004 HC RX 250 GENERAL PHARMACY W/ HCPCS (ALT 636 FOR OP/ED)

## 2024-11-03 PROCEDURE — 85027 COMPLETE CBC AUTOMATED: CPT | Performed by: EMERGENCY MEDICINE

## 2024-11-03 PROCEDURE — 36415 COLL VENOUS BLD VENIPUNCTURE: CPT | Performed by: EMERGENCY MEDICINE

## 2024-11-03 PROCEDURE — 99231 SBSQ HOSP IP/OBS SF/LOW 25: CPT

## 2024-11-03 PROCEDURE — 80048 BASIC METABOLIC PNL TOTAL CA: CPT | Performed by: INTERNAL MEDICINE

## 2024-11-03 PROCEDURE — 99232 SBSQ HOSP IP/OBS MODERATE 35: CPT | Performed by: INTERNAL MEDICINE

## 2024-11-03 PROCEDURE — 1200000002 HC GENERAL ROOM WITH TELEMETRY DAILY

## 2024-11-03 PROCEDURE — 99233 SBSQ HOSP IP/OBS HIGH 50: CPT | Performed by: STUDENT IN AN ORGANIZED HEALTH CARE EDUCATION/TRAINING PROGRAM

## 2024-11-03 PROCEDURE — 2500000004 HC RX 250 GENERAL PHARMACY W/ HCPCS (ALT 636 FOR OP/ED): Performed by: EMERGENCY MEDICINE

## 2024-11-03 ASSESSMENT — COGNITIVE AND FUNCTIONAL STATUS - GENERAL
MOBILITY SCORE: 24
DAILY ACTIVITIY SCORE: 24

## 2024-11-03 ASSESSMENT — PAIN SCALES - GENERAL
PAINLEVEL_OUTOF10: 0 - NO PAIN

## 2024-11-03 ASSESSMENT — PAIN - FUNCTIONAL ASSESSMENT: PAIN_FUNCTIONAL_ASSESSMENT: 0-10

## 2024-11-03 NOTE — CARE PLAN
The patient's goals for the shift include an increase in appetite.     The clinical goals for the shift include Pt's nausea will decrease this shift.      Problem: Pain - Adult  Goal: Verbalizes/displays adequate comfort level or baseline comfort level  Outcome: Progressing     Problem: Safety - Adult  Goal: Free from fall injury  Outcome: Progressing     Problem: Discharge Planning  Goal: Discharge to home or other facility with appropriate resources  Outcome: Progressing     Problem: Chronic Conditions and Co-morbidities  Goal: Patient's chronic conditions and co-morbidity symptoms are monitored and maintained or improved  Outcome: Progressing

## 2024-11-03 NOTE — PROGRESS NOTES
Elise Chávez is a 49 y.o. female     Patient's stools are less frequent  Less cramping  Breathing little better    Review of Systems     Constitutional: no fever, no chills, not feeling poorly, not feeling tired   Cardiovascular: no chest pain   Respiratory: no cough, wheezing or shortness of breath a  Gastrointestinal: Area  Neurological: no headache,   All other systems have been reviewed and are negative for complaint.       Vitals:    11/03/24 1235   BP: 112/73   Pulse: (!) 113   Resp: 18   Temp: 36.4 °C (97.5 °F)   SpO2: 96%        Scheduled medications  ciprofloxacin, 400 mg, intravenous, q12h  methylPREDNISolone sodium succinate (PF), 20 mg, intravenous, q8h  metroNIDAZOLE, 500 mg, intravenous, q8h  pantoprazole, 40 mg, oral, Daily before breakfast   Or  pantoprazole, 40 mg, intravenous, Daily before breakfast      Continuous medications  heparin, 0-4,500 Units/hr, Last Rate: 1,200 Units/hr (11/03/24 1220)      PRN medications  PRN medications: acetaminophen **OR** acetaminophen **OR** acetaminophen, guaiFENesin, heparin, melatonin, ondansetron **OR** ondansetron, polyethylene glycol    Lab Review   Results from last 7 days   Lab Units 11/03/24  0435 11/02/24  0640 11/01/24  1049   WBC AUTO x10*3/uL 24.3* 24.0* 24.7*   HEMOGLOBIN g/dL 10.0* 10.7* 12.4   HEMATOCRIT % 31.0* 33.0* 39.5   PLATELETS AUTO x10*3/uL 1,208* 1,340* 1,209*     Results from last 7 days   Lab Units 11/03/24  0435 11/02/24  0640 11/01/24  1723 10/31/24  2041   SODIUM mmol/L 139 136 136 137   POTASSIUM mmol/L 3.9 3.7 3.8 3.8   CHLORIDE mmol/L 107 104 104 99   CO2 mmol/L 27 25 24 30   BUN mg/dL 15 9 10 13   CREATININE mg/dL 0.59 0.56 0.63 0.82   CALCIUM mg/dL 8.1* 8.1* 8.1* 8.9   PROTEIN TOTAL g/dL  --   --   --  8.1   BILIRUBIN TOTAL mg/dL  --   --   --  0.5   ALK PHOS U/L  --   --   --  111*   ALT U/L  --   --   --  9   AST U/L  --   --   --  12   GLUCOSE mg/dL 116* 121* 107* 103*     Results from last 7 days   Lab Units 11/01/24  9128  10/31/24  2041   TROPHS ng/L 14* 9        Transthoracic Echo (TTE) Complete   Final Result      CT angio chest for pulmonary embolism   Final Result   1. Enlargement of pre-existing completely occlusive thrombus within   the right main pulmonary artery which now extends to the < 1 cm from   the right/left pulmonary artery bifurcation, previously was 2.7 cm   from the bifurcation. Worsening of main pulmonary artery dilatation   worsening of right heart strain compared to 09/12/2024 and   significantly greater compared to 10/20/2021. Patient is undergone   attempted mechanical thrombectomy at outside institution on   (05/15/2024) demonstrating extensive chronic clot that was difficult   to remove per outside note.        2. Over course of prior studies, there is also continued worsening   dilatation of the left-sided pulmonary arteries due to chronic   thromboembolic pulmonary hypertension.        3. Diffuse moderate pericardial effusion        MACRO:   Krzysztof Candelario discussed the significance and urgency of this   critical finding via EPIC HAIKU with Dr. Elizondo  on 10/31/2024   at 10:32 pm.  (**-RCF-**) Findings:  See findings.        Signed by: Krzysztof Candelario 10/31/2024 10:44 PM   Dictation workstation:   [id number]      CT abdomen pelvis w IV contrast   Final Result   Diffuse active inflammation of the colon contiguously extending from   the rectum to the ascending colon which is significantly progressed   in the transverse colon, again demonstrating characteristic   featureless appearance consistent with known ulcerative colitis.        No other acute abnormalities in the abdomen or pelvis.        Please see separate report of the chest for pulmonary findings.        MACRO:   None.        Signed by: Krzysztof Candelario 10/31/2024 10:50 PM   Dictation workstation:   [id number]            Physical Exam    Constitutional   General appearance: Alert and in no acute distress.   Pulmonary   Respiratory  assessment: No respiratory distress, normal respiratory rhythm and effort.    Auscultation of Lungs: Clear bilateral breath sounds.   Cardiovascular   Auscultation of heart: Tachycardia exam for edema: Trace peripheral edema.   Abdomen   Abdominal Exam: No bruits, normal bowel sounds, soft, non-tender, no abdominal mass palpated.    Liver and Spleen exam: No hepato-splenomegaly.   Musculoskeletal   Examination of gait: Normal.    Inspection of digits and nails: No clubbing or cyanosis of the fingernails.    Inspection/palpation of joints, bones and muscles: No joint swelling. Normal movement of all extremities.   Neurologic   Cranial nerves: Nerves 2-12 were intact, no focal neuro defects.         Assessment/Plan      #Acute pulmonary embolism  #Severely elevated right ventricular systolic pressure  #Pulmonary hypertension  #Tachycardia with hypotension  Continue heparin drip  Monitor for bleeding     #Acute colitis  Agreed to start antibiotics    Continue Solu-Medrol     #Thrombocytosis/polycythemia vera  Patient is on BESREMi that she started in July she  She follows with her hematologist in [place]

## 2024-11-03 NOTE — PROGRESS NOTES
"Elise Chávez is a 49 y.o. female on day 2 of admission presenting with Diarrhea, unspecified type.    Subjective   No acute events overnight.  Patient seen and examined at bedside.  Reports that she feels better with IV steroids.  Has less frequent bowel movements.  Was able to tolerate diet-mac & cheese.  Feels better overall.       Objective     Physical Exam  Constitutional:       Appearance: Normal appearance.   HENT:      Head: Normocephalic and atraumatic.   Cardiovascular:      Rate and Rhythm: Regular rhythm. Tachycardia present.      Pulses: Normal pulses.      Heart sounds: Normal heart sounds.   Pulmonary:      Effort: Pulmonary effort is normal.      Breath sounds: Normal breath sounds.   Abdominal:      General: Abdomen is flat. Bowel sounds are normal. There is no distension.      Palpations: Abdomen is soft.      Tenderness: There is no abdominal tenderness. There is no guarding.   Musculoskeletal:         General: Normal range of motion.      Cervical back: Normal range of motion and neck supple.   Skin:     General: Skin is warm and dry.   Neurological:      General: No focal deficit present.      Mental Status: She is alert and oriented to person, place, and time.   Psychiatric:         Mood and Affect: Mood normal.         Behavior: Behavior normal.         Last Recorded Vitals  Blood pressure 112/73, pulse (!) 113, temperature 36.4 °C (97.5 °F), temperature source Temporal, resp. rate 18, height 1.626 m (5' 4\"), weight 52.6 kg (116 lb), SpO2 96%.  Intake/Output last 3 Shifts:  I/O last 3 completed shifts:  In: 1050.6 (20 mL/kg) [P.O.:960; I.V.:90.6 (1.7 mL/kg)]  Out: 2 (0 mL/kg) [Urine:2 (0 mL/kg/hr)]  Weight: 52.6 kg     Relevant Results             Scheduled medications  ciprofloxacin, 400 mg, intravenous, q12h  methylPREDNISolone sodium succinate (PF), 20 mg, intravenous, q8h  metroNIDAZOLE, 500 mg, intravenous, q8h  pantoprazole, 40 mg, oral, Daily before breakfast   Or  pantoprazole, 40 mg, " intravenous, Daily before breakfast      Continuous medications  heparin, 0-4,500 Units/hr, Last Rate: 1,200 Units/hr (11/03/24 1220)      PRN medications  PRN medications: acetaminophen **OR** acetaminophen **OR** acetaminophen, guaiFENesin, heparin, melatonin, ondansetron **OR** ondansetron, polyethylene glycol     Results for orders placed or performed during the hospital encounter of 10/31/24 (from the past 24 hours)   Heparin Assay, UFH   Result Value Ref Range    Heparin Unfractionated 0.3 See Comment Below for Therapeutic Ranges IU/mL   CBC   Result Value Ref Range    WBC 24.3 (H) 4.4 - 11.3 x10*3/uL    nRBC 0.0 0.0 - 0.0 /100 WBCs    RBC 3.99 (L) 4.00 - 5.20 x10*6/uL    Hemoglobin 10.0 (L) 12.0 - 16.0 g/dL    Hematocrit 31.0 (L) 36.0 - 46.0 %    MCV 78 (L) 80 - 100 fL    MCH 25.1 (L) 26.0 - 34.0 pg    MCHC 32.3 32.0 - 36.0 g/dL    RDW 19.6 (H) 11.5 - 14.5 %    Platelets 1,208 (H) 150 - 450 x10*3/uL   Basic metabolic panel   Result Value Ref Range    Glucose 116 (H) 74 - 99 mg/dL    Sodium 139 136 - 145 mmol/L    Potassium 3.9 3.5 - 5.3 mmol/L    Chloride 107 98 - 107 mmol/L    Bicarbonate 27 21 - 32 mmol/L    Anion Gap 9 (L) 10 - 20 mmol/L    Urea Nitrogen 15 6 - 23 mg/dL    Creatinine 0.59 0.50 - 1.05 mg/dL    eGFR >90 >60 mL/min/1.73m*2    Calcium 8.1 (L) 8.6 - 10.3 mg/dL   Heparin Assay, UFH   Result Value Ref Range    Heparin Unfractionated 0.3 See Comment Below for Therapeutic Ranges IU/mL                 Assessment/Plan   Assessment & Plan  Diarrhea, unspecified type         Elise Chávez is a 49 y.o. 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   GI was consulted  for management of ulcerative colitis.  Infectious stool workup came back negative.  Platelets today 1,208 and trending down.  She is on a heparin drip.    -Diet as tolerated  -Continue IV steroids 20 mg Solu-Medrol IV every 8 hours and then switch to prednisone taper  -Calprotectin pending  -Recommend outpatient follow-up with GI for colonoscopy and IBD specialist for further management of ulcerative colitis.  Patient was offered to undergo flex sig but declined. Per her hematologist, patient should not be off AC due to high platelets count and history of multiple PE's.  She has recently scheduled colonoscopy but was not able to undergo it due to high platelets  -GI will follow       Andrez Carbajal, APRN-CNP

## 2024-11-03 NOTE — CARE PLAN
The patient's goals for the shift include  vss    The clinical goals for the shift include Patient will remain HDS    Over the shift, the patient did not make progress toward the following goals. Barriers to progression include n/a. Recommendations to address these barriers include  n/a

## 2024-11-03 NOTE — PROGRESS NOTES
"Elise Chávez is a 49 y.o. female on day 2 of admission presenting with Diarrhea, unspecified type.    Subjective   Patient seen at bedside, reports she is feeling significantly improved today without palpitations, SOB or cough. She is having decreased stool output and improved abdominal pain. Denies hemoptysis or overt hematochezia.        Objective     Physical Exam  Constitutional:       General: She is not in acute distress.     Appearance: Normal appearance. She is not toxic-appearing.   HENT:      Head: Normocephalic and atraumatic.      Nose: No rhinorrhea.      Mouth/Throat:      Mouth: Mucous membranes are moist.   Eyes:      Extraocular Movements: Extraocular movements intact.      Conjunctiva/sclera: Conjunctivae normal.      Pupils: Pupils are equal, round, and reactive to light.   Cardiovascular:      Rate and Rhythm: Regular rhythm. Tachycardia present.      Heart sounds: No murmur heard.     No friction rub.      Comments: Widely split S2  Pulmonary:      Effort: Pulmonary effort is normal.      Breath sounds: Normal breath sounds. No wheezing, rhonchi or rales.   Abdominal:      General: There is no distension.      Palpations: Abdomen is soft.      Tenderness: There is abdominal tenderness (RUQ and epigastric- improving). There is no guarding or rebound.   Musculoskeletal:      Cervical back: Normal range of motion.      Right lower leg: No edema.      Left lower leg: No edema.   Skin:     General: Skin is warm and dry.   Neurological:      General: No focal deficit present.      Mental Status: She is alert and oriented to person, place, and time.   Psychiatric:         Mood and Affect: Mood normal.         Behavior: Behavior normal.         Thought Content: Thought content normal.         Last Recorded Vitals  Blood pressure 130/90, pulse (!) 113, temperature 36.6 °C (97.9 °F), temperature source Temporal, resp. rate 18, height 1.626 m (5' 4\"), weight 52.6 kg (116 lb), SpO2 96%.  Intake/Output last 3 " Shifts:  I/O last 3 completed shifts:  In: 1050.6 (20 mL/kg) [P.O.:960; I.V.:90.6 (1.7 mL/kg)]  Out: 2 (0 mL/kg) [Urine:2 (0 mL/kg/hr)]  Weight: 52.6 kg     Relevant Results                   Results for orders placed or performed during the hospital encounter of 10/31/24 (from the past 24 hours)   CBC   Result Value Ref Range    WBC 24.3 (H) 4.4 - 11.3 x10*3/uL    nRBC 0.0 0.0 - 0.0 /100 WBCs    RBC 3.99 (L) 4.00 - 5.20 x10*6/uL    Hemoglobin 10.0 (L) 12.0 - 16.0 g/dL    Hematocrit 31.0 (L) 36.0 - 46.0 %    MCV 78 (L) 80 - 100 fL    MCH 25.1 (L) 26.0 - 34.0 pg    MCHC 32.3 32.0 - 36.0 g/dL    RDW 19.6 (H) 11.5 - 14.5 %    Platelets 1,208 (H) 150 - 450 x10*3/uL   Basic metabolic panel   Result Value Ref Range    Glucose 116 (H) 74 - 99 mg/dL    Sodium 139 136 - 145 mmol/L    Potassium 3.9 3.5 - 5.3 mmol/L    Chloride 107 98 - 107 mmol/L    Bicarbonate 27 21 - 32 mmol/L    Anion Gap 9 (L) 10 - 20 mmol/L    Urea Nitrogen 15 6 - 23 mg/dL    Creatinine 0.59 0.50 - 1.05 mg/dL    eGFR >90 >60 mL/min/1.73m*2    Calcium 8.1 (L) 8.6 - 10.3 mg/dL   Heparin Assay, UFH   Result Value Ref Range    Heparin Unfractionated 0.3 See Comment Below for Therapeutic Ranges IU/mL     No results found.             Assessment/Plan   Assessment & Plan  Diarrhea, unspecified type    Elise Chávez is a 49 y.o. 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 CTPE showed ?propagation of the right main PA clot. Pulmonary is consulted for PE with RV strain.     Ms. Chávez is having continued significant R main PA chronic PE with signs of worsening CTEPH with RV failure. Her tachycardia is still present but improving overall on anticoagulation and with steroids  "for colitis.      #Chronic PE with R PA occlusion  #CTEPH  #RV failure  #ET on Besremi  #UC in acute flare vs infection     Recommendations:  -Discussed anticoagulation options given that the best anticoagulant for CTPEH is warfarin, explained that it would also allow us to monitor drug levels and measure \"thinness\" of her blood. She has previously declined warfarin, however she is willing to consider it as an outpatient after discussion with her primary hematologist Dr. Franklin. I will also email Dr. Franklin to discuss  -Discussed with Dr. Michael Aguirre, transcatheter treatments are unlikely to be very effective for her chronic clots as she has significant calcified chronic Pes and is likely to clot again quickly. [place] [facility] [place] with [facility] about surgical endarterectomy. I discussed with Ms. Chávez, she does not wish for transfer or surgical evaluation at this time as she is aware that she will clot again with her platelets being so high. She wishes to continue her Besremi to help with her ET and focus on treating her UC flare prior to discussing surgical interventions. Discussed with Ms. Chávez yesterday and today that I am concerned about her hemodynamics and RV dysfunction and that if the clot propagates, this could lead to worsening RV failure and potential for death. She is aware of this but wishes to treat her ET to reduce her surgical risk and reduce the risk of repeat thrombosis before undergoing that type of surgery. Other concerns include insurance/work/financial burdens right now. She is agreeable to follow up with me and possible with interventional cardiology outpatient.   -Discussed IVC filter given her clot propagation, however all of her prior LE dopplers have been negative. I discussed that it may help embolic phenomenon, however there is a risk of clotting off her IVC if her anticoagulation is interrupted. She declines IVC filter at this time  -Continue high " intensity heparin ggt, appreciate heme input regarding anticoagulation  -Appreciate GI input regarding UC flare    Given patient preferences, no major changes to her CTEPH management will happen while here in the hospital. As such, there are no pulmonary barriers to discharge. Will request follow up appointment with me after discharge.        I spent 60 minutes in the professional and overall care of this patient.      Michelle Giordano, DO

## 2024-11-04 ENCOUNTER — APPOINTMENT (OUTPATIENT)
Dept: PRIMARY CARE | Facility: CLINIC | Age: 49
End: 2024-11-04
Payer: COMMERCIAL

## 2024-11-04 PROBLEM — I27.82 CHRONIC PULMONARY EMBOLISM WITHOUT ACUTE COR PULMONALE (MULTI): Status: ACTIVE | Noted: 2023-05-23

## 2024-11-04 PROBLEM — I27.24 CTEPH (CHRONIC THROMBOEMBOLIC PULMONARY HYPERTENSION): Status: ACTIVE | Noted: 2024-11-04

## 2024-11-04 PROBLEM — K51.00: Status: ACTIVE | Noted: 2024-11-04

## 2024-11-04 PROBLEM — I26.09 CHRONIC PULMONARY EMBOLISM WITH ACUTE COR PULMONALE (MULTI): Status: ACTIVE | Noted: 2023-05-23

## 2024-11-04 LAB
ANION GAP SERPL CALC-SCNC: 10 MMOL/L (ref 10–20)
BUN SERPL-MCNC: 16 MG/DL (ref 6–23)
CALCIUM SERPL-MCNC: 8.1 MG/DL (ref 8.6–10.3)
CHLORIDE SERPL-SCNC: 105 MMOL/L (ref 98–107)
CO2 SERPL-SCNC: 27 MMOL/L (ref 21–32)
CREAT SERPL-MCNC: 0.54 MG/DL (ref 0.5–1.05)
EGFRCR SERPLBLD CKD-EPI 2021: >90 ML/MIN/1.73M*2
ERYTHROCYTE [DISTWIDTH] IN BLOOD BY AUTOMATED COUNT: 20 % (ref 11.5–14.5)
GLUCOSE SERPL-MCNC: 95 MG/DL (ref 74–99)
HCT VFR BLD AUTO: 33.7 % (ref 36–46)
HGB BLD-MCNC: 10.8 G/DL (ref 12–16)
MCH RBC QN AUTO: 25.2 PG (ref 26–34)
MCHC RBC AUTO-ENTMCNC: 32 G/DL (ref 32–36)
MCV RBC AUTO: 79 FL (ref 80–100)
NRBC BLD-RTO: 0 /100 WBCS (ref 0–0)
PLATELET # BLD AUTO: 1395 X10*3/UL (ref 150–450)
POTASSIUM SERPL-SCNC: 4 MMOL/L (ref 3.5–5.3)
RBC # BLD AUTO: 4.28 X10*6/UL (ref 4–5.2)
SODIUM SERPL-SCNC: 138 MMOL/L (ref 136–145)
UFH PPP CHRO-ACNC: 0.2 IU/ML
WBC # BLD AUTO: 27.4 X10*3/UL (ref 4.4–11.3)

## 2024-11-04 PROCEDURE — 85027 COMPLETE CBC AUTOMATED: CPT | Performed by: EMERGENCY MEDICINE

## 2024-11-04 PROCEDURE — 2500000004 HC RX 250 GENERAL PHARMACY W/ HCPCS (ALT 636 FOR OP/ED): Mod: JZ | Performed by: NURSE PRACTITIONER

## 2024-11-04 PROCEDURE — 99232 SBSQ HOSP IP/OBS MODERATE 35: CPT | Performed by: INTERNAL MEDICINE

## 2024-11-04 PROCEDURE — 2500000001 HC RX 250 WO HCPCS SELF ADMINISTERED DRUGS (ALT 637 FOR MEDICARE OP): Performed by: NURSE PRACTITIONER

## 2024-11-04 PROCEDURE — 1200000002 HC GENERAL ROOM WITH TELEMETRY DAILY

## 2024-11-04 PROCEDURE — 85520 HEPARIN ASSAY: CPT | Performed by: EMERGENCY MEDICINE

## 2024-11-04 PROCEDURE — 99233 SBSQ HOSP IP/OBS HIGH 50: CPT | Performed by: STUDENT IN AN ORGANIZED HEALTH CARE EDUCATION/TRAINING PROGRAM

## 2024-11-04 PROCEDURE — 36415 COLL VENOUS BLD VENIPUNCTURE: CPT | Performed by: INTERNAL MEDICINE

## 2024-11-04 PROCEDURE — 80048 BASIC METABOLIC PNL TOTAL CA: CPT | Performed by: INTERNAL MEDICINE

## 2024-11-04 PROCEDURE — 2500000004 HC RX 250 GENERAL PHARMACY W/ HCPCS (ALT 636 FOR OP/ED)

## 2024-11-04 ASSESSMENT — COGNITIVE AND FUNCTIONAL STATUS - GENERAL
MOBILITY SCORE: 24
DAILY ACTIVITIY SCORE: 24
MOBILITY SCORE: 24
DAILY ACTIVITIY SCORE: 24

## 2024-11-04 ASSESSMENT — PAIN SCALES - GENERAL
PAINLEVEL_OUTOF10: 0 - NO PAIN
PAINLEVEL_OUTOF10: 0 - NO PAIN

## 2024-11-04 ASSESSMENT — PAIN - FUNCTIONAL ASSESSMENT: PAIN_FUNCTIONAL_ASSESSMENT: 0-10

## 2024-11-04 NOTE — PROGRESS NOTES
"Elise Chávez is a 49 y.o. female on day 3 of admission presenting with Diarrhea, unspecified type.    Subjective   Her breathing feels fine. Requesting discharge home tomorrow to vote and for job interview on Wednesday.       Objective   GEN: resting. Breathing well  CV: RRR, prominent S2  LUNGS: good effort, clear bilaterally, no w/r/r  EXT: no edema, cyanosis, clubbing  Physical Exam    Last Recorded Vitals  Blood pressure 125/85, pulse 96, temperature 36.9 °C (98.4 °F), temperature source Temporal, resp. rate 20, height 1.626 m (5' 4\"), weight 52.6 kg (116 lb), SpO2 96%.  Intake/Output last 3 Shifts:  I/O last 3 completed shifts:  In: 580 (11 mL/kg) [P.O.:480; IV Piggyback:100]  Out: 2 (0 mL/kg) [Urine:2 (0 mL/kg/hr)]  Weight: 52.6 kg     Relevant Results  Scheduled medications  apixaban, 5 mg, oral, BID  methylPREDNISolone sodium succinate (PF), 20 mg, intravenous, q8h  metroNIDAZOLE, 500 mg, intravenous, q8h  pantoprazole, 40 mg, oral, Daily before breakfast   Or  pantoprazole, 40 mg, intravenous, Daily before breakfast      Continuous medications     PRN medications  PRN medications: acetaminophen **OR** acetaminophen **OR** acetaminophen, guaiFENesin, melatonin, ondansetron **OR** ondansetron, polyethylene glycol    Results for orders placed or performed during the hospital encounter of 10/31/24 (from the past 24 hours)   Heparin Assay, UFH   Result Value Ref Range    Heparin Unfractionated 0.2 See Comment Below for Therapeutic Ranges IU/mL   Basic metabolic panel   Result Value Ref Range    Glucose 95 74 - 99 mg/dL    Sodium 138 136 - 145 mmol/L    Potassium 4.0 3.5 - 5.3 mmol/L    Chloride 105 98 - 107 mmol/L    Bicarbonate 27 21 - 32 mmol/L    Anion Gap 10 10 - 20 mmol/L    Urea Nitrogen 16 6 - 23 mg/dL    Creatinine 0.54 0.50 - 1.05 mg/dL    eGFR >90 >60 mL/min/1.73m*2    Calcium 8.1 (L) 8.6 - 10.3 mg/dL   CBC   Result Value Ref Range    WBC 27.4 (H) 4.4 - 11.3 x10*3/uL    nRBC 0.0 0.0 - 0.0 /100 WBCs    " RBC 4.28 4.00 - 5.20 x10*6/uL    Hemoglobin 10.8 (L) 12.0 - 16.0 g/dL    Hematocrit 33.7 (L) 36.0 - 46.0 %    MCV 79 (L) 80 - 100 fL    MCH 25.2 (L) 26.0 - 34.0 pg    MCHC 32.0 32.0 - 36.0 g/dL    RDW 20.0 (H) 11.5 - 14.5 %    Platelets 1,395 (H) 150 - 450 x10*3/uL                                     Assessment/Plan   Assessment & Plan  Chronic pulmonary embolism with acute cor pulmonale (Multi)    CTEPH (chronic thromboembolic pulmonary hypertension)    49F with UC, ROBBIE-2 essential thrombocytosis, multiple pulmonary emboli s/p thrombectomy in 2021 and 2024, CTEPH. Echo with RV volume overload and increasing pressures to 101mmHg.  Hemodynamics are stable. No supplemental O2 requirements.    Recommendations:  -She would like to follow up with Dr. Giordano as outpatient. Referral has been placed  -She is considering initiating warfarin therapy, but would like to discuss with her hematologist first  -She would like her platelet counts to normalize before considering any endovascular or open thrombectomies  -No further pulmonary inpatient interventions to offer her  -Will sign off    Chandan Lim, DO  Pulmonary & Critical Care  11/4/2024 1:51 PM

## 2024-11-04 NOTE — PROGRESS NOTES
Elise Chávez is a 49 y.o. female     More gassy  Not happy that she was given IV Cipro without her approval    Review of Systems     Constitutional: no fever, no chills, not feeling poorly, not feeling tired   Cardiovascular: no chest pain   Respiratory: no cough, wheezing or shortness of breath a  Gastrointestinal: Area  Neurological: no headache,   All other systems have been reviewed and are negative for complaint.       Vitals:    11/04/24 1209   BP: 109/74   Pulse:    Resp:    Temp: 37 °C (98.6 °F)   SpO2: 92%        Scheduled medications  apixaban, 5 mg, oral, BID  methylPREDNISolone sodium succinate (PF), 20 mg, intravenous, q8h  metroNIDAZOLE, 500 mg, intravenous, q8h  pantoprazole, 40 mg, oral, Daily before breakfast   Or  pantoprazole, 40 mg, intravenous, Daily before breakfast      Continuous medications       PRN medications  PRN medications: acetaminophen **OR** acetaminophen **OR** acetaminophen, guaiFENesin, melatonin, ondansetron **OR** ondansetron, polyethylene glycol    Lab Review   Results from last 7 days   Lab Units 11/04/24 0621 11/03/24 0435 11/02/24  0640   WBC AUTO x10*3/uL 27.4* 24.3* 24.0*   HEMOGLOBIN g/dL 10.8* 10.0* 10.7*   HEMATOCRIT % 33.7* 31.0* 33.0*   PLATELETS AUTO x10*3/uL 1,395* 1,208* 1,340*     Results from last 7 days   Lab Units 11/04/24 0621 11/03/24  0435 11/02/24  0640 11/01/24  1723 10/31/24  2041   SODIUM mmol/L 138 139 136   < > 137   POTASSIUM mmol/L 4.0 3.9 3.7   < > 3.8   CHLORIDE mmol/L 105 107 104   < > 99   CO2 mmol/L 27 27 25   < > 30   BUN mg/dL 16 15 9   < > 13   CREATININE mg/dL 0.54 0.59 0.56   < > 0.82   CALCIUM mg/dL 8.1* 8.1* 8.1*   < > 8.9   PROTEIN TOTAL g/dL  --   --   --   --  8.1   BILIRUBIN TOTAL mg/dL  --   --   --   --  0.5   ALK PHOS U/L  --   --   --   --  111*   ALT U/L  --   --   --   --  9   AST U/L  --   --   --   --  12   GLUCOSE mg/dL 95 116* 121*   < > 103*    < > = values in this interval not displayed.     Results from last 7 days    Lab Units 11/01/24  1723 10/31/24  2041   TROPHS ng/L 14* 9        Transthoracic Echo (TTE) Complete   Final Result      CT angio chest for pulmonary embolism   Final Result   1. Enlargement of pre-existing completely occlusive thrombus within   the right main pulmonary artery which now extends to the < 1 cm from   the right/left pulmonary artery bifurcation, previously was 2.7 cm   from the bifurcation. Worsening of main pulmonary artery dilatation   worsening of right heart strain compared to 09/12/2024 and   significantly greater compared to 10/20/2021. Patient is undergone   attempted mechanical thrombectomy at outside institution on   (05/15/2024) demonstrating extensive chronic clot that was difficult   to remove per outside note.        2. Over course of prior studies, there is also continued worsening   dilatation of the left-sided pulmonary arteries due to chronic   thromboembolic pulmonary hypertension.        3. Diffuse moderate pericardial effusion        MACRO:   Krzysztof Candelario discussed the significance and urgency of this   critical finding via [employer] with Dr. Elizondo  on 10/31/2024   at 10:32 pm.  (**-RCF-**) Findings:  See findings.        Signed by: Krzysztof Candelario 10/31/2024 10:44 PM   Dictation workstation:   [id number]      CT abdomen pelvis w IV contrast   Final Result   Diffuse active inflammation of the colon contiguously extending from   the rectum to the ascending colon which is significantly progressed   in the transverse colon, again demonstrating characteristic   featureless appearance consistent with known ulcerative colitis.        No other acute abnormalities in the abdomen or pelvis.        Please see separate report of the chest for pulmonary findings.        MACRO:   None.        Signed by: Krzysztof Candelairo 10/31/2024 10:50 PM   Dictation workstation:   [id number]            Physical Exam    Constitutional   General appearance: Alert and in no acute distress.    Pulmonary   Respiratory assessment: No respiratory distress, normal respiratory rhythm and effort.    Auscultation of Lungs: Clear bilateral breath sounds.   Cardiovascular   Auscultation of heart: Tachycardia exam for edema: Trace peripheral edema.   Abdomen   Abdominal Exam: No bruits, normal bowel sounds, soft, non-tender, no abdominal mass palpated.    Liver and Spleen exam: No hepato-splenomegaly.   Musculoskeletal   Examination of gait: Normal.    Inspection of digits and nails: No clubbing or cyanosis of the fingernails.    Inspection/palpation of joints, bones and muscles: No joint swelling. Normal movement of all extremities.   Neurologic   Cranial nerves: Nerves 2-12 were intact, no focal neuro defects.         Assessment/Plan      #Acute pulmonary embolism  #Severely elevated right ventricular systolic pressure  #Pulmonary hypertension  #Tachycardia with hypotension  No active bleeding seen  We will start Eliquis tonight     #Acute colitis  Cipro DC'd  Continue Flagyl  Continue Solu-Medrol     #Thrombocytosis/polycythemia vera  Patient is on BESREMi that she started in July she  She follows with her hematologist in [place]

## 2024-11-04 NOTE — NURSING NOTE
Patient currently want medical staff to be aware she does not want IV Cipro. Nurse Robin jackson .

## 2024-11-04 NOTE — PROGRESS NOTES
11/04/24 1736   Discharge Planning   Assistance Needed IDT/traid rounds w/ Attending, CMO, asst nurse/nurse manager, to discuss discharge planning; patient endores discharge early approx 11am, to go vote, patient stated she wants to discharge on eliquis for now, medication to her [facility] pharmacy   Expected Discharge Disposition Home   Does the patient need discharge transport arranged? No  (patient's friend will provide transportation home)     16:30 [facility] notified 6th floor nurse manager that patient may be interested in voting while in hospital at this time/responded will reach out to patient.

## 2024-11-04 NOTE — CARE PLAN
The patient's goals for the shift include      The clinical goals for the shift include remain hds      Problem: Pain - Adult  Goal: Verbalizes/displays adequate comfort level or baseline comfort level  Outcome: Progressing     Problem: Safety - Adult  Goal: Free from fall injury  Outcome: Progressing

## 2024-11-04 NOTE — CARE PLAN
Problem: Pain - Adult  Goal: Verbalizes/displays adequate comfort level or baseline comfort level  Outcome: Progressing     Problem: Safety - Adult  Goal: Free from fall injury  Outcome: Progressing     Problem: Discharge Planning  Goal: Discharge to home or other facility with appropriate resources  Outcome: Progressing     Problem: Chronic Conditions and Co-morbidities  Goal: Patient's chronic conditions and co-morbidity symptoms are monitored and maintained or improved  Outcome: Progressing   The patient's goals for the shift include      The clinical goals for the shift include pt will remain safe through end of shift    Over the shift, the patient did not make progress toward the following goals. Barriers to progression include . Recommendations to address these barriers include .

## 2024-11-05 ENCOUNTER — OFFICE VISIT (OUTPATIENT)
Dept: PRIMARY CARE | Facility: CLINIC | Age: 49
End: 2024-11-05
Payer: COMMERCIAL

## 2024-11-05 VITALS
OXYGEN SATURATION: 94 % | HEIGHT: 64 IN | TEMPERATURE: 96.9 F | SYSTOLIC BLOOD PRESSURE: 124 MMHG | DIASTOLIC BLOOD PRESSURE: 82 MMHG | HEART RATE: 96 BPM | BODY MASS INDEX: 19.81 KG/M2 | RESPIRATION RATE: 19 BRPM | WEIGHT: 116 LBS

## 2024-11-05 DIAGNOSIS — D47.3 ESSENTIAL (HEMORRHAGIC) THROMBOCYTHEMIA: Primary | ICD-10-CM

## 2024-11-05 DIAGNOSIS — I26.99 MULTIPLE PULMONARY EMBOLI (MULTI): ICD-10-CM

## 2024-11-05 LAB
ANION GAP SERPL CALC-SCNC: 9 MMOL/L (ref 10–20)
BACTERIA BLD CULT: NORMAL
BACTERIA BLD CULT: NORMAL
BUN SERPL-MCNC: 15 MG/DL (ref 6–23)
CALCIUM SERPL-MCNC: 8.1 MG/DL (ref 8.6–10.3)
CALPROTECTIN STL-MCNT: >3000 UG/G
CHLORIDE SERPL-SCNC: 106 MMOL/L (ref 98–107)
CO2 SERPL-SCNC: 28 MMOL/L (ref 21–32)
CREAT SERPL-MCNC: 0.63 MG/DL (ref 0.5–1.05)
EGFRCR SERPLBLD CKD-EPI 2021: >90 ML/MIN/1.73M*2
ERYTHROCYTE [DISTWIDTH] IN BLOOD BY AUTOMATED COUNT: 20.3 % (ref 11.5–14.5)
GLUCOSE SERPL-MCNC: 118 MG/DL (ref 74–99)
HCT VFR BLD AUTO: 35.7 % (ref 36–46)
HGB BLD-MCNC: 11.4 G/DL (ref 12–16)
MCH RBC QN AUTO: 25.1 PG (ref 26–34)
MCHC RBC AUTO-ENTMCNC: 31.9 G/DL (ref 32–36)
MCV RBC AUTO: 79 FL (ref 80–100)
NRBC BLD-RTO: 0.1 /100 WBCS (ref 0–0)
PLATELET # BLD AUTO: 1334 X10*3/UL (ref 150–450)
POTASSIUM SERPL-SCNC: 5.3 MMOL/L (ref 3.5–5.3)
RBC # BLD AUTO: 4.54 X10*6/UL (ref 4–5.2)
SODIUM SERPL-SCNC: 138 MMOL/L (ref 136–145)
WBC # BLD AUTO: 27.5 X10*3/UL (ref 4.4–11.3)

## 2024-11-05 PROCEDURE — 80048 BASIC METABOLIC PNL TOTAL CA: CPT | Performed by: INTERNAL MEDICINE

## 2024-11-05 PROCEDURE — 36415 COLL VENOUS BLD VENIPUNCTURE: CPT | Performed by: EMERGENCY MEDICINE

## 2024-11-05 PROCEDURE — 99239 HOSP IP/OBS DSCHRG MGMT >30: CPT | Performed by: INTERNAL MEDICINE

## 2024-11-05 PROCEDURE — 2500000001 HC RX 250 WO HCPCS SELF ADMINISTERED DRUGS (ALT 637 FOR MEDICARE OP): Performed by: NURSE PRACTITIONER

## 2024-11-05 PROCEDURE — 85027 COMPLETE CBC AUTOMATED: CPT | Performed by: EMERGENCY MEDICINE

## 2024-11-05 PROCEDURE — 2500000004 HC RX 250 GENERAL PHARMACY W/ HCPCS (ALT 636 FOR OP/ED): Mod: JZ | Performed by: NURSE PRACTITIONER

## 2024-11-05 PROCEDURE — 96372 THER/PROPH/DIAG INJ SC/IM: CPT | Performed by: STUDENT IN AN ORGANIZED HEALTH CARE EDUCATION/TRAINING PROGRAM

## 2024-11-05 PROCEDURE — 2500000004 HC RX 250 GENERAL PHARMACY W/ HCPCS (ALT 636 FOR OP/ED)

## 2024-11-05 RX ORDER — METRONIDAZOLE 500 MG/1
500 TABLET ORAL 3 TIMES DAILY
Qty: 9 TABLET | Refills: 0 | Status: SHIPPED | OUTPATIENT
Start: 2024-11-05 | End: 2024-11-05

## 2024-11-05 RX ORDER — PANTOPRAZOLE SODIUM 40 MG/1
40 TABLET, DELAYED RELEASE ORAL
Qty: 30 TABLET | Refills: 2 | Status: SHIPPED | OUTPATIENT
Start: 2024-11-06 | End: 2025-02-04

## 2024-11-05 RX ORDER — PANTOPRAZOLE SODIUM 40 MG/1
40 TABLET, DELAYED RELEASE ORAL
Qty: 30 TABLET | Refills: 2 | Status: SHIPPED | OUTPATIENT
Start: 2024-11-06 | End: 2024-11-05

## 2024-11-05 RX ORDER — PREDNISONE 10 MG/1
TABLET ORAL
Qty: 97 TABLET | Refills: 0 | Status: SHIPPED | OUTPATIENT
Start: 2024-11-05 | End: 2024-11-05

## 2024-11-05 RX ORDER — METRONIDAZOLE 500 MG/1
500 TABLET ORAL 3 TIMES DAILY
Qty: 9 TABLET | Refills: 0 | Status: SHIPPED | OUTPATIENT
Start: 2024-11-05 | End: 2024-11-08

## 2024-11-05 RX ORDER — PREDNISONE 10 MG/1
TABLET ORAL
Qty: 97 TABLET | Refills: 0 | Status: SHIPPED | OUTPATIENT
Start: 2024-11-05 | End: 2025-01-02

## 2024-11-05 ASSESSMENT — PAIN SCALES - GENERAL: PAINLEVEL_OUTOF10: 0 - NO PAIN

## 2024-11-05 ASSESSMENT — PAIN - FUNCTIONAL ASSESSMENT: PAIN_FUNCTIONAL_ASSESSMENT: 0-10

## 2024-11-05 NOTE — DISCHARGE SUMMARY
Discharge Diagnosis  Diarrhea, unspecified type    Issues Requiring Follow-Up  Follow-up with hematology  Follow-up with gastroenterology  Follow-up with primary care doctor    Test Results Pending At Discharge  Pending Labs       Order Current Status    Extra Urine Gray Tube Collected (11/01/24 0795)    Calprotectin Stool In process    Urinalysis with Reflex Culture and Microscopic In process            Hospital Course  Patient with a past medical history of ulcerative colitis polycythemia vera history of JAK2 mutation history of multiple pulmonary emboli s/p thromboembolectomies who decided to get off Eliquis and all her other medications and start natural herbal treatments along with reiki comes in with increasing abdominal pain diarrhea and worsening shortness of breath  Patient attributes her new pulmonary embolism to the fact that she was not able to absorb her oral medications because of the persistent diarrhea  Has had chills but no fever  No recent use of antibiotics  The patient's last colonoscopy was more than 10 years ago for ulcerative colitis  She has not been taking any medications with ulcerative colitis and controls her colitis with the help of reiki    The patient's echocardiogram showed significant right ventricle systolic pressure elevation  Recommendations were made for the patient to Transfer to the main [facility] for possible thrombectomy  However the patient declined  We continued with the heparin drip and monitor for GI bleed  After discussed with gastroenterology and initial hesitation she agreed to Solu-Medrol and antibiotics  The patient's stomach symptoms have improved with the Solu-Medrol and has had no bleeding  Patient also agreed to start taking Eliquis upon discharge    We transition the patient to Eliquis  And monitor for bleeding of which she has had none    Will discharge the patient home on Eliquis  Recommend lifelong Eliquis  Also recommend colonoscopy for the ulcerative  colitis  Will discharge patient home on steroid taper and Flagyl        Pertinent Physical Exam At Time of Discharge  Physical Exam    Constitutional   General appearance: Alert and in no acute distress.     Pulmonary   Respiratory assessment: No respiratory distress, normal respiratory rhythm and effort.    Auscultation of Lungs: Clear bilateral breath sounds.   Cardiovascular   Auscultation of heart: Tachycardia  Exam for edema: No peripheral edema.   Abdomen   Abdominal Exam: No bruits, normal bowel sounds, soft, non-tender, no abdominal mass palpated.    Liver and Spleen exam: No hepato-splenomegaly.   Musculoskeletal   Examination of gait: Normal.    Inspection of digits and nails: No clubbing or cyanosis of the fingernails.    Inspection/palpation of joints, bones and muscles: No joint swelling. Normal movement of all extremities.   Skin   Skin inspection: Normal skin color and pigmentation, normal skin turgor and no visible rash.   Neurologic   Cranial nerves: Nerves 2-12 were intact, no focal neuro defects.    Home Medications     Medication List      START taking these medications     metroNIDAZOLE 500 mg tablet; Commonly known as: Flagyl; Take 1 tablet   (500 mg) by mouth 3 times a day for 3 days.   pantoprazole 40 mg EC tablet; Commonly known as: ProtoNix; Take 1 tablet   (40 mg) by mouth once daily in the morning. Take before meals. Do not   crush, chew, or split.; Start taking on: November 6, 2024     CHANGE how you take these medications     predniSONE 10 mg tablet; Commonly known as: Deltasone; Take 4 tablets   (40 mg) by mouth once daily for 7 days, THEN 3 tablets (30 mg) once daily   for 7 days, THEN 2 tablets (20 mg) once daily for 7 days, THEN 1 tablet   (10 mg) once daily for 30 days, THEN 0.5 tablets (5 mg) once daily for 7   days.; Start taking on: November 5, 2024; What changed: See the new   instructions.     CONTINUE taking these medications     apixaban 5 mg tablet; Commonly known as:  Eliquis; Take 1 tablet (5 mg)   by mouth every 12 hours.   Besremi subcutaneous syringe; Generic drug: ropeginterferon [id number]       Outpatient Follow-Up  Future Appointments   Date Time Provider Department [place]   11/5/2024  3:15 PM Ante DO Sylvia Morales[address]   11/7/2024  1:00 PM Ante VINICIUS Cazares DO DO[address]   11/18/2024 10:45 AM Ante VINICIUS Cazares DO DO[address]     Patient seen at bedside. Events from the last visit reviewed. Discussed with staff. Results of tests and investigations from last visit reviewed and discussed with patient/Family. Electronic chart on [facility] reviewed. Input / Recommendations  from consultants  appreciated and reviewed and agreed with.     discharge summary and profile completed. medications reviewed and discussed with patient and family.  scripts completed and signed.     total discharge time in excess of 30 minutes.    Milton Persaud MD

## 2024-11-05 NOTE — CARE PLAN
The patient's goals for the shift include      The clinical goals for the shift include maintain safety      Problem: Pain - Adult  Goal: Verbalizes/displays adequate comfort level or baseline comfort level  Outcome: Progressing     Problem: Safety - Adult  Goal: Free from fall injury  Outcome: Progressing

## 2024-11-05 NOTE — PROGRESS NOTES
11/05/24 1640   Discharge Planning   Assistance Needed Traid rounds w/Attending follow up re; discharge plan remains the same-no needs anticipated' patient declined Eliquis coupons-already on a program for Eliquis discount   Expected Discharge Disposition Home

## 2024-11-05 NOTE — PROGRESS NOTES
Chronic history of pulmonary emboli  Injection was provided by the patient and was administered today by Cesilia Andrade MA

## 2024-11-05 NOTE — PROGRESS NOTES
"Elise Chávez is a 49 y.o. female on day 3 of admission presenting with Diarrhea, unspecified type.    Subjective   Patient seen at bedside. Continues to have loose watery bowel movements without bleeding. Overall frequency is improved but not consistency. Tolerating small portions of regular diet. Had a lot of gas last night.        Objective     Physical Exam  Constitutional:       Appearance: She is normal weight.   HENT:      Head: Normocephalic.   Eyes:      General: No scleral icterus.     Pupils: Pupils are equal, round, and reactive to light.   Cardiovascular:      Rate and Rhythm: Normal rate and regular rhythm.   Pulmonary:      Effort: Pulmonary effort is normal.   Abdominal:      General: Abdomen is flat. Bowel sounds are normal. There is no distension.      Palpations: Abdomen is soft.   Skin:     General: Skin is warm.   Neurological:      General: No focal deficit present.      Mental Status: She is alert.   Psychiatric:         Mood and Affect: Mood normal.         Last Recorded Vitals  Blood pressure 109/74, pulse 96, temperature 37 °C (98.6 °F), temperature source Temporal, resp. rate 20, height 1.626 m (5' 4\"), weight 52.6 kg (116 lb), SpO2 92%.  Intake/Output last 3 Shifts:  I/O last 3 completed shifts:  In: 1003.6 (19.1 mL/kg) [P.O.:240; I.V.:663.6 (12.6 mL/kg); IV Piggyback:100]  Out: - (0 mL/kg)   Weight: 52.6 kg     Relevant Results  Lab Results   Component Value Date    WBC 27.4 (H) 11/04/2024    HGB 10.8 (L) 11/04/2024    HCT 33.7 (L) 11/04/2024    MCV 79 (L) 11/04/2024    PLT 1,395 (H) 11/04/2024    CT ABD/PELVIS  Impression   Diffuse active inflammation of the colon contiguously extending from  the rectum to the ascending colon which is significantly progressed  in the transverse colon, again demonstrating characteristic  featureless appearance consistent with known ulcerative colitis.         Assessment/Plan   Assessment & Plan  Diarrhea, unspecified type    Chronic pulmonary embolism with " acute cor pulmonale (Multi)    CTEPH (chronic thromboembolic pulmonary hypertension)    Chronic pancolonic ulcerative colitis (Multi)    Elise Chávez is a 49 y.o. 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   GI was consulted for management of ulcerative colitis.  Infectious stool workup negative. Last colonoscopy 2013 with pan colitis     - Recommend continued IV steroids with PO taper on discharge  - no strong indication to continue abx but given patient reports symptomatic improvement with them she would like to continue them on discharge to complete short course  - diet as tolerated, recommend low fiber  - discussed chronic nature of UC and likely need for some kind of medical therapy to prevent progression of disease. In the past patient managed her symptoms with holistic methods. It is also important to control UC inflammation as ongoing inflammation does increase risk for VTE which she is already at risk for  - f.up fecal calpro as outpatient  - had appointment with  but had to cancel so will arrange follow-up     No GI barriers to discharge with above plan    I spent 45 minutes in the professional and overall care of this patient.      Giselle Stock MD

## 2024-11-06 ENCOUNTER — PATIENT OUTREACH (OUTPATIENT)
Dept: CARE COORDINATION | Facility: CLINIC | Age: 49
End: 2024-11-06
Payer: COMMERCIAL

## 2024-11-06 NOTE — PROGRESS NOTES
Outreach call to patient to support a smooth transition of care from recent admission.  Spoke with patient, she was in a bit of a rush due to having an interview.  She stated she feels a bit better.    Will continue to monitor through transition period.    Mya SWIFT, RN, [facility]  Accountable Care Organization  O: [phone]

## 2024-11-07 ENCOUNTER — APPOINTMENT (OUTPATIENT)
Dept: PRIMARY CARE | Facility: CLINIC | Age: 49
End: 2024-11-07
Payer: COMMERCIAL

## 2024-11-07 VITALS
SYSTOLIC BLOOD PRESSURE: 100 MMHG | WEIGHT: 121 LBS | OXYGEN SATURATION: 96 % | DIASTOLIC BLOOD PRESSURE: 62 MMHG | BODY MASS INDEX: 20.77 KG/M2 | HEART RATE: 130 BPM

## 2024-11-07 DIAGNOSIS — R00.0 TACHYCARDIA: ICD-10-CM

## 2024-11-07 DIAGNOSIS — I26.99 MULTIPLE PULMONARY EMBOLI (MULTI): ICD-10-CM

## 2024-11-07 DIAGNOSIS — Z09 HOSPITAL DISCHARGE FOLLOW-UP: Primary | ICD-10-CM

## 2024-11-07 DIAGNOSIS — D47.3 ESSENTIAL (HEMORRHAGIC) THROMBOCYTHEMIA: ICD-10-CM

## 2024-11-07 DIAGNOSIS — R79.89 ABNORMAL CBC: ICD-10-CM

## 2024-11-07 PROCEDURE — 99496 TRANSJ CARE MGMT HIGH F2F 7D: CPT | Performed by: STUDENT IN AN ORGANIZED HEALTH CARE EDUCATION/TRAINING PROGRAM

## 2024-11-07 PROCEDURE — 1036F TOBACCO NON-USER: CPT | Performed by: STUDENT IN AN ORGANIZED HEALTH CARE EDUCATION/TRAINING PROGRAM

## 2024-11-07 NOTE — DOCUMENTATION CLARIFICATION NOTE
"    PATIENT:               VIKI CHARLES  ACCT #:                  [id number]  MRN:                       [id number]  :                       1975  ADMIT DATE:       10/31/2024 9:21 PM  DISCH DATE:        2024 12:04 PM  RESPONDING PROVIDER #:        [id number]          PROVIDER RESPONSE TEXT:    Patient's presenting symptoms related ulcerative colitis with complication    CDI QUERY TEXT:    Clarification        Instruction:    Based on your assessment of the patient and the clinical information, please provide the requested documentation by clicking on the appropriate radio button and enter any additional information if prompted.    Question: Please further clarify the most likely etiology of patient's abdominal pain, diarrhea with nausea and emesis on admission after final work up    When answering this query, please exercise your independent professional judgment. The fact that a question is being asked, does not imply that any particular answer is desired or expected.    The patient's clinical indicators include:  Clinical Information: Patient presented with diarrhea, abdominal pain, and nausea with 1 episode of vomiting    Clinical Indicators:  -Occult blood negative per ED note    -10/31/24 ED note diagnoses: \"Diarrhea, tachycardia, chronic saddle pulmonary embolism with acute cor pulmonale, ulcerative colitis with complication\"    -11/3/24 Primary care Attending progress note-assessment: \"Acute colitis-Agreed to start antibiotics-Continue Solu-Medrol\"    -11/3/24 GI Note-assessment: \"presented with diarrhea, abdominal pain, and nausea with 1 episode of vomiting. GI was consulted for management of ulcerative colitis.  Infectious stool workup came back negative.\"      Treatment: IV Flagyl-11/3/24-24, IV Solu-Medrol-24-24, IV Cipro-24    Risk Factors: Ulcerative colitis  Options provided:  -- Patient's presenting symptoms related infectious colitis, Please indicate infectious source such " as viral versus bacterial below  -- Patient's presenting symptoms related ulcerative colitis with complication, Please indicate UC complication below  -- Patient's presenting symptoms related ulcerative colitis without complication  -- Patient's presenting symptoms multifactorial 2/2 infectious colitis and ulcerative colitis, Please indicate infectious source such as viral versus bacterial below  -- Other - I will add my own diagnosis  -- Refer to Clinical Documentation Reviewer    Query created by: Zuleyma Narayanan on 11/4/2024 1:02 PM      Electronically signed by:  GAYLE CARMONA MD 11/7/2024 10:25 AM

## 2024-11-07 NOTE — PROGRESS NOTES
"Subjective   Patient ID: Elise Chávez is a 49 y.o. female who presents for Follow-up.    HPI   Patient is following up for recent hospitalization from October 31 and was ultimately discharged on 11/5/2024    Based on the discharge summary -     \"Patient with a past medical history of ulcerative colitis polycythemia vera history of JAK2 mutation history of multiple pulmonary emboli s/p thromboembolectomies who decided to get off Eliquis and all her other medications and start natural herbal treatments along with reiki comes in with increasing abdominal pain diarrhea and worsening shortness of breath  Patient attributes her new pulmonary embolism to the fact that she was not able to absorb her oral medications because of the persistent diarrhea  Has had chills but no fever  No recent use of antibiotics  The patient's last colonoscopy was more than 10 years ago for ulcerative colitis  She has not been taking any medications with ulcerative colitis and controls her colitis with the help of reiki     The patient's echocardiogram showed significant right ventricle systolic pressure elevation  Recommendations were made for the patient to Transfer to the main campus for possible thrombectomy  However the patient declined  We continued with the heparin drip and monitor for GI bleed  After discussed with gastroenterology and initial hesitation she agreed to Solu-Medrol and antibiotics  The patient's stomach symptoms have improved with the Solu-Medrol and has had no bleeding  Patient also agreed to start taking Eliquis upon discharge     We transition the patient to Eliquis  And monitor for bleeding of which she has had none     Will discharge the patient home on Eliquis  Recommend lifelong Eliquis  Also recommend colonoscopy for the ulcerative colitis  Will discharge patient home on steroid taper and Flagyl\"    Patient today continues to have signs/symptoms of feeling slightly tired as well as some GI upset/diarrhea  She is " still tolerating the oral prednisone as well as the Flagyl but feels that it continues to irritate her stomach since now this is going through her GI tract    Continues to refuse further care other than wishing to follow-up with her hematologist as well as continuing the injectable, BESREMi, every 2 weeks    Also stated that she is willing to follow-up with a new pulmonologist that she found within her hospital stay and states that she will be making appointment in the near future    Otherwise, denies any excessive shortness of breath and refuses to think to use any other medications other than now considering the possibility of Coumadin/warfarin if this is another option    Slightly upset that nobody else brought this up in the past    Wishing to discuss this further at today's visit      Review of Systems  All pertinent positive symptoms are included in the history of present illness.    All other systems have been reviewed and are negative and noncontributory to this patient's current ailments.    Objective   /62 (BP Location: Left arm, Patient Position: Sitting, BP Cuff Size: Adult)   Pulse (!) 130   Wt 54.9 kg (121 lb)   LMP  (LMP Unknown)   SpO2 96%   BMI 20.77 kg/m²     Physical Exam  CONSTITUTIONAL -appears fatigued and frail, thin, looks like stated age, in no acute distress, appears slightly ill  SKIN -pale skin, normal skin turgor without rash, lesions, or nodules visualized  HEAD - no trauma, normocephalic  EYES - normal external exam  CHEST -no distressed breathing, good effort  EXTREMITIES - no edema, no deformities  NEUROLOGICAL - normal balance, normal motor, no ataxia  PSYCHIATRIC - alert, pleasant and cordial, age-appropriate      Assessment/Plan   We had another conversation about all of your signs/symptoms and I truly recommend that you contact your hematologist ASAP as I am fearful that you are not getting better even though you completely disagree with my judgment at this time    I  feel that you need to make an appointment to discuss the risks and benefits of continuing BESREMi or even looking into another avenue    I did recommend that you go back to the emergency room at any point if you notice worsening signs/symptoms    Also, I understand that you are going to follow-up with a gastroenterologist and I truly recommend you do this within the next 6-8 weeks to have another colonoscopy due to your last being over 10 years ago  This was discussed earlier this year and a requisition was placed in your chart    And I state this again, I know you have refused many treatments in the past and I recommend that you reconsider some of the recommendations that these other providers are giving to you    Please contact the office if you have any further question/concerns and please follow-up in 2 weeks for continued and close care

## 2024-11-07 NOTE — DOCUMENTATION CLARIFICATION NOTE
"    PATIENT:               VIKI CHARLES  ACCT #:                  [id number]  MRN:                       [id number]  :                       1975  ADMIT DATE:       10/31/2024 9:21 PM  DISCH DATE:        2024 12:04 PM  RESPONDING PROVIDER #:        [id number]          PROVIDER RESPONSE TEXT:    Acute on chronic pulmonary embolus with acute cor pulmonale    CDI QUERY TEXT:    Clarification      Instruction:    Based on your assessment of the patient and the clinical information, please provide the requested documentation by clicking on the appropriate radio button and enter any additional information if prompted.    Question: Is there a diagnosis related to the diagnosis of PE    When answering this query, please exercise your independent professional judgment. The fact that a question is being asked, does not imply that any particular answer is desired or expected.    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 Worsening of main pulmonary artery dilatation worsening of right heart strain compared to 2024 and significantly greater compared to 10/20/2021. Patient is undergone  attempted mechanical thrombectomy at outside institution on (05/15/2024) demonstrating extensive chronic clot that was difficult  to remove per outside note.\"    -11/3/24 Primary care Attending progress note-assessment/plan: \"Acute pulmonary embolism. Continue heparin drip\"    -11/3/24 Pulmonary " "progress note-assessment/recommendations: \"Ms. Chávez is having continued significant R main PA chronic PE with signs of worsening CTEPH with RV failure. Discussed with Dr. Michael Aguirre, transcatheter treatments are unlikely to be very effective for her chronic clots as she has significant calcified chronic Pes and is likely to clot again quickly. She recommended transfer to [facility] for discussion with CTS about surgical endarterectomy.\"      Treatment: Oral Eliquis 11/4/24, IV Heparin-11/1/24-11/4/24    Risk Factors: History COVID, chronic thromboembolic pulmonary hypertension  Options provided:  -- Acute on chronic pulmonary embolus with acute cor pulmonale  -- Acute on chronic pulmonary embolus without acute cor pulmonale  -- Acute on chronic pulmonary embolus with chronic cor pulmonale  -- Acute pulmonary embolism with Acute Cor Pulmonale  -- Acute pulmonary embolism without Acute Cor Pulmonale  -- Other - I will add my own diagnosis  -- Refer to Clinical Documentation Reviewer    Query created by: Zuleyma Narayanan on 11/4/2024 1:49 PM      Electronically signed by:  GAYLE CARMONA MD 11/7/2024 10:25 AM          "

## 2024-11-08 ENCOUNTER — PATIENT OUTREACH (OUTPATIENT)
Dept: CARE COORDINATION | Facility: CLINIC | Age: 49
End: 2024-11-08
Payer: COMMERCIAL

## 2024-11-08 NOTE — PROGRESS NOTES
Outreach call to patient following up on appointment with care provider.  Patient with no additional questions at this time.  Will continue to follow.      Mya SWIFT, RN, [facility]  O: [phone]

## 2024-11-18 ENCOUNTER — APPOINTMENT (OUTPATIENT)
Dept: PRIMARY CARE | Facility: CLINIC | Age: 49
End: 2024-11-18
Payer: COMMERCIAL

## 2024-11-18 ENCOUNTER — LAB (OUTPATIENT)
Dept: LAB | Facility: LAB | Age: 49
End: 2024-11-18
Payer: COMMERCIAL

## 2024-11-18 VITALS
HEART RATE: 150 BPM | WEIGHT: 120.6 LBS | OXYGEN SATURATION: 96 % | DIASTOLIC BLOOD PRESSURE: 60 MMHG | SYSTOLIC BLOOD PRESSURE: 102 MMHG | BODY MASS INDEX: 20.7 KG/M2

## 2024-11-18 DIAGNOSIS — D47.3 ESSENTIAL (HEMORRHAGIC) THROMBOCYTHEMIA: ICD-10-CM

## 2024-11-18 DIAGNOSIS — Z09 HOSPITAL DISCHARGE FOLLOW-UP: ICD-10-CM

## 2024-11-18 DIAGNOSIS — R04.2 HEMOPTYSIS: ICD-10-CM

## 2024-11-18 DIAGNOSIS — I26.99 MULTIPLE PULMONARY EMBOLI (MULTI): ICD-10-CM

## 2024-11-18 DIAGNOSIS — R53.83 OTHER FATIGUE: ICD-10-CM

## 2024-11-18 DIAGNOSIS — D47.3 ESSENTIAL (HEMORRHAGIC) THROMBOCYTHEMIA: Primary | ICD-10-CM

## 2024-11-18 DIAGNOSIS — R79.89 ABNORMAL CBC: ICD-10-CM

## 2024-11-18 DIAGNOSIS — R00.0 TACHYCARDIA: ICD-10-CM

## 2024-11-18 LAB
25(OH)D3 SERPL-MCNC: 14 NG/ML (ref 30–100)
ACANTHOCYTES BLD QL SMEAR: NORMAL
ALBUMIN SERPL BCP-MCNC: 3.7 G/DL (ref 3.4–5)
ALP SERPL-CCNC: 82 U/L (ref 33–110)
ALT SERPL W P-5'-P-CCNC: 16 U/L (ref 7–45)
ANION GAP SERPL CALC-SCNC: 12 MMOL/L (ref 10–20)
AST SERPL W P-5'-P-CCNC: 14 U/L (ref 9–39)
BASOPHILS # BLD AUTO: 0.18 X10*3/UL (ref 0–0.1)
BASOPHILS NFR BLD AUTO: 0.6 %
BILIRUB SERPL-MCNC: 0.5 MG/DL (ref 0–1.2)
BUN SERPL-MCNC: 27 MG/DL (ref 6–23)
CALCIUM SERPL-MCNC: 9.1 MG/DL (ref 8.6–10.3)
CHLORIDE SERPL-SCNC: 99 MMOL/L (ref 98–107)
CO2 SERPL-SCNC: 31 MMOL/L (ref 21–32)
CREAT SERPL-MCNC: 0.68 MG/DL (ref 0.5–1.05)
EGFRCR SERPLBLD CKD-EPI 2021: >90 ML/MIN/1.73M*2
EOSINOPHIL # BLD AUTO: 0.94 X10*3/UL (ref 0–0.7)
EOSINOPHIL NFR BLD AUTO: 3.2 %
ERYTHROCYTE [DISTWIDTH] IN BLOOD BY AUTOMATED COUNT: 24.1 % (ref 11.5–14.5)
EST. AVERAGE GLUCOSE BLD GHB EST-MCNC: 134 MG/DL
GLUCOSE SERPL-MCNC: 84 MG/DL (ref 74–99)
HBA1C MFR BLD: 6.3 %
HCT VFR BLD AUTO: 42 % (ref 36–46)
HGB BLD-MCNC: 13 G/DL (ref 12–16)
IMM GRANULOCYTES # BLD AUTO: 0.25 X10*3/UL (ref 0–0.7)
IMM GRANULOCYTES NFR BLD AUTO: 0.9 % (ref 0–0.9)
LYMPHOCYTES # BLD AUTO: 6.15 X10*3/UL (ref 1.2–4.8)
LYMPHOCYTES NFR BLD AUTO: 21.1 %
MAGNESIUM SERPL-MCNC: 2.26 MG/DL (ref 1.6–2.4)
MCH RBC QN AUTO: 26.1 PG (ref 26–34)
MCHC RBC AUTO-ENTMCNC: 31 G/DL (ref 32–36)
MCV RBC AUTO: 84 FL (ref 80–100)
MONOCYTES # BLD AUTO: 1.91 X10*3/UL (ref 0.1–1)
MONOCYTES NFR BLD AUTO: 6.6 %
NEUTROPHILS # BLD AUTO: 19.67 X10*3/UL (ref 1.2–7.7)
NEUTROPHILS NFR BLD AUTO: 67.6 %
NRBC BLD-RTO: 0 /100 WBCS (ref 0–0)
OVALOCYTES BLD QL SMEAR: NORMAL
PLATELET # BLD AUTO: 1563 X10*3/UL (ref 150–450)
POTASSIUM SERPL-SCNC: 3.7 MMOL/L (ref 3.5–5.3)
PROT SERPL-MCNC: 7.5 G/DL (ref 6.4–8.2)
RBC # BLD AUTO: 4.98 X10*6/UL (ref 4–5.2)
RBC MORPH BLD: NORMAL
SODIUM SERPL-SCNC: 138 MMOL/L (ref 136–145)
TSH SERPL-ACNC: 2.62 MIU/L (ref 0.44–3.98)
VIT B12 SERPL-MCNC: 428 PG/ML (ref 211–911)
WBC # BLD AUTO: 29.1 X10*3/UL (ref 4.4–11.3)

## 2024-11-18 PROCEDURE — 80053 COMPREHEN METABOLIC PANEL: CPT

## 2024-11-18 PROCEDURE — 83735 ASSAY OF MAGNESIUM: CPT

## 2024-11-18 PROCEDURE — 36415 COLL VENOUS BLD VENIPUNCTURE: CPT

## 2024-11-18 PROCEDURE — 82607 VITAMIN B-12: CPT

## 2024-11-18 PROCEDURE — 99214 OFFICE O/P EST MOD 30 MIN: CPT | Performed by: STUDENT IN AN ORGANIZED HEALTH CARE EDUCATION/TRAINING PROGRAM

## 2024-11-18 PROCEDURE — 85025 COMPLETE CBC W/AUTO DIFF WBC: CPT

## 2024-11-18 PROCEDURE — 1036F TOBACCO NON-USER: CPT | Performed by: STUDENT IN AN ORGANIZED HEALTH CARE EDUCATION/TRAINING PROGRAM

## 2024-11-18 PROCEDURE — 83036 HEMOGLOBIN GLYCOSYLATED A1C: CPT

## 2024-11-18 PROCEDURE — 84443 ASSAY THYROID STIM HORMONE: CPT

## 2024-11-18 PROCEDURE — 82306 VITAMIN D 25 HYDROXY: CPT

## 2024-11-18 PROCEDURE — 96372 THER/PROPH/DIAG INJ SC/IM: CPT | Performed by: STUDENT IN AN ORGANIZED HEALTH CARE EDUCATION/TRAINING PROGRAM

## 2024-11-18 NOTE — PROGRESS NOTES
Subjective   Patient ID: Elise Chávez is a 49 y.o. female who presents for Follow-up.    HPI   Patient is following up for recent hospitalization from October 31 and was ultimately discharged on 11/5/2024    She was last seen on 11/7/2024  At that time she was just a few days after getting discharged as well as already had her her injection before that and today she wishes to get another 100 mcg of BESREMi    Ultimately she still notes to being tachycardic and some slight shortness of breath but did follow-up with her hematologist down in [place] via a telemedicine/virtual visit    It is decided that they will increase to 150 mcg of the BESREMi starting December 1    At this time she is trying to follow-up with pulmonology as well and states that she does have a GI provider scheduled in February due to her colitis    Otherwise, does not want to deviate from the plan and coming into the office today to discuss this further    Did have some slight hemoptysis this morning but stated that it was just a slight amount    Also continues to take the Eliquis      Review of Systems  All pertinent positive symptoms are included in the history of present illness.    All other systems have been reviewed and are negative and noncontributory to this patient's current ailments.    Objective   /60 (BP Location: Left arm, Patient Position: Sitting, BP Cuff Size: Adult)   Pulse (!) 150   Wt 54.7 kg (120 lb 9.6 oz)   LMP  (LMP Unknown)   SpO2 96%   BMI 20.70 kg/m²     Physical Exam  CONSTITUTIONAL -appears fatigued and frail but better when compared to last appointment, thin, looks like stated age, in no acute distress, appears slightly ill  SKIN -pale skin, normal skin turgor without rash, lesions, or nodules visualized  HEAD - no trauma, normocephalic  EYES - normal external exam  CHEST -no distressed breathing, good effort  EXTREMITIES - no edema, no deformities  NEUROLOGICAL - normal balance, normal motor, no  ataxia  PSYCHIATRIC - alert, pleasant and cordial, age-appropriate      Assessment/Plan   Continue following up with her hematologist per their guidelines    I understand you want to continue to try the BESREMi and I would recommend you follow-up with the specialty provider as stated above    I did order some lab work to further evaluate your kidney function, liver function, and other lab test due to your fatigue  Please have this done after today's visit      I did recommend that you go back to the emergency room at any point if you notice worsening signs/symptoms    Please follow-up with GI at your scheduled appointment early next year      Please keep me in the loop and how you are doing and I know we will provide your next injection in 2 weeks from now    I know we sometimes disagree on some treatment options with please keep an open mind that if we need to get other opinions in the future    At any point if you notice worsening or acute signs/symptoms go to the emergency room as noted above

## 2024-11-19 DIAGNOSIS — I26.99 MULTIPLE PULMONARY EMBOLI (MULTI): ICD-10-CM

## 2024-11-19 DIAGNOSIS — R39.9 UTI SYMPTOMS: Primary | ICD-10-CM

## 2024-11-19 NOTE — RESULT ENCOUNTER NOTE
Vitamin D very low at 14 so I would recommend supplementation    Unfortunately complete blood cell count continues to show an elevation of platelets at 1563 alongside an elevated white blood cell count of 29    I would truly recommend she discusses this with her hematologist    Hemoglobin A1c did increase slightly up to 6.3% up from 6.0%    Sugar, kidneys, liver, electrolytes are all within normal limits and/were stable  BUN slightly elevated which most likely points towards some slight dehydration    Vitamin B12 within normal limits alongside a normal thyroid    Magnesium within normal limits

## 2024-11-20 ENCOUNTER — LAB (OUTPATIENT)
Dept: LAB | Facility: LAB | Age: 49
End: 2024-11-20
Payer: COMMERCIAL

## 2024-11-20 DIAGNOSIS — R39.9 UTI SYMPTOMS: ICD-10-CM

## 2024-11-20 LAB
APPEARANCE UR: CLEAR
BILIRUB UR STRIP.AUTO-MCNC: NEGATIVE MG/DL
COLOR UR: YELLOW
GLUCOSE UR STRIP.AUTO-MCNC: NORMAL MG/DL
KETONES UR STRIP.AUTO-MCNC: NEGATIVE MG/DL
LEUKOCYTE ESTERASE UR QL STRIP.AUTO: NEGATIVE
NITRITE UR QL STRIP.AUTO: NEGATIVE
PH UR STRIP.AUTO: 6 [PH]
PROT UR STRIP.AUTO-MCNC: NEGATIVE MG/DL
RBC # UR STRIP.AUTO: NEGATIVE /UL
SP GR UR STRIP.AUTO: 1.02
UROBILINOGEN UR STRIP.AUTO-MCNC: NORMAL MG/DL

## 2024-11-20 PROCEDURE — 81003 URINALYSIS AUTO W/O SCOPE: CPT

## 2024-11-20 PROCEDURE — 87086 URINE CULTURE/COLONY COUNT: CPT

## 2024-11-21 LAB
ATRIAL RATE: 123 BPM
P AXIS: 65 DEGREES
P OFFSET: 193 MS
P ONSET: 139 MS
PR INTERVAL: 142 MS
Q ONSET: 210 MS
QRS COUNT: 20 BEATS
QRS DURATION: 86 MS
QT INTERVAL: 336 MS
QTC CALCULATION(BAZETT): 481 MS
QTC FREDERICIA: 426 MS
R AXIS: 148 DEGREES
T AXIS: 80 DEGREES
T OFFSET: 378 MS
VENTRICULAR RATE: 123 BPM

## 2024-11-22 LAB — BACTERIA UR CULT: NORMAL

## 2024-12-02 ENCOUNTER — APPOINTMENT (OUTPATIENT)
Dept: PRIMARY CARE | Facility: CLINIC | Age: 49
End: 2024-12-02
Payer: COMMERCIAL

## 2024-12-02 DIAGNOSIS — R00.0 TACHYCARDIA: ICD-10-CM

## 2024-12-02 DIAGNOSIS — D47.3 ESSENTIAL (HEMORRHAGIC) THROMBOCYTHEMIA: Primary | ICD-10-CM

## 2024-12-02 DIAGNOSIS — R79.89 ABNORMAL CBC: ICD-10-CM

## 2024-12-02 DIAGNOSIS — I26.99 MULTIPLE PULMONARY EMBOLI (MULTI): ICD-10-CM

## 2024-12-02 PROCEDURE — 96372 THER/PROPH/DIAG INJ SC/IM: CPT | Performed by: STUDENT IN AN ORGANIZED HEALTH CARE EDUCATION/TRAINING PROGRAM

## 2024-12-02 PROCEDURE — 1036F TOBACCO NON-USER: CPT | Performed by: STUDENT IN AN ORGANIZED HEALTH CARE EDUCATION/TRAINING PROGRAM

## 2024-12-02 RX ORDER — ENOXAPARIN SODIUM 100 MG/ML
57.5 INJECTION SUBCUTANEOUS EVERY 12 HOURS
COMMUNITY
Start: 2024-11-30

## 2024-12-02 ASSESSMENT — PATIENT HEALTH QUESTIONNAIRE - PHQ9
1. LITTLE INTEREST OR PLEASURE IN DOING THINGS: NOT AT ALL
SUM OF ALL RESPONSES TO PHQ9 QUESTIONS 1 AND 2: 0
2. FEELING DOWN, DEPRESSED OR HOPELESS: NOT AT ALL

## 2024-12-05 ENCOUNTER — TELEMEDICINE (OUTPATIENT)
Dept: PRIMARY CARE | Facility: CLINIC | Age: 49
End: 2024-12-05
Payer: COMMERCIAL

## 2024-12-05 ENCOUNTER — PATIENT OUTREACH (OUTPATIENT)
Dept: CARE COORDINATION | Facility: CLINIC | Age: 49
End: 2024-12-05

## 2024-12-05 DIAGNOSIS — R04.2 HEMOPTYSIS: ICD-10-CM

## 2024-12-05 DIAGNOSIS — I26.99 MULTIPLE PULMONARY EMBOLI (MULTI): ICD-10-CM

## 2024-12-05 DIAGNOSIS — K51.919 ULCERATIVE COLITIS WITH COMPLICATION, UNSPECIFIED LOCATION (MULTI): ICD-10-CM

## 2024-12-05 DIAGNOSIS — R00.0 TACHYCARDIA: Primary | ICD-10-CM

## 2024-12-05 DIAGNOSIS — R79.89 ABNORMAL CBC: ICD-10-CM

## 2024-12-05 DIAGNOSIS — D47.3 ESSENTIAL (HEMORRHAGIC) THROMBOCYTHEMIA: ICD-10-CM

## 2024-12-05 DIAGNOSIS — Z09 HOSPITAL DISCHARGE FOLLOW-UP: ICD-10-CM

## 2024-12-05 PROCEDURE — 1036F TOBACCO NON-USER: CPT | Performed by: STUDENT IN AN ORGANIZED HEALTH CARE EDUCATION/TRAINING PROGRAM

## 2024-12-05 PROCEDURE — 99215 OFFICE O/P EST HI 40 MIN: CPT | Performed by: STUDENT IN AN ORGANIZED HEALTH CARE EDUCATION/TRAINING PROGRAM

## 2024-12-05 NOTE — PROGRESS NOTES
"Subjective   Patient ID: Elise Chávez is a 49 y.o. female who presents for Follow-up.    HPI   Patient unfortunately had signs/symptoms of shortness of breath as well as chest discomfort    She went to the emergency room at [facility] and ultimately was diagnosed with her chronic pulmonary emboli as well as with cor pulmonale present    She ultimately was admitted in the hospital and was discharged on 12/1/2024 it was recommended that she start Lovenox 1 mg/kg, 60 mg, subcutaneous injections twice daily as well as to stop the Eliquis    It was recommended that she get another opinion for hematology/oncology from the [facility] as well as follow-up with the pulmonary hypertension specialty provider    It was also recommended that she follows up with a functional medicine as well as vascular medicine    He was recommended that we also repeat a CT scan in 3-6 months    On further chart review, it was noted when she was in the MICU, interventional radiology was consulted and they recommend that she was not a candidate for thrombectomy and that she was transitioned to therapeutic Lovenox for anticoagulation      Per her discharge note     \"#Right Main Pulmonary Artery Embolism  #CTEPH   #Mitral Valve Mobile Echodensity  - CTA PE showed large occlusive thrombus in the right main pulmonary artery and its branches without any opacification of the pulmonary arteries throughout the right lung, which could represent propagation of the thrombosis seen on the prior exam 09/19/2023 versus recurrent thromboembolism  - NT Pro , Trop 10 -10.  - TTE showing dilated RV, systolic/diastolic flattening of interventricular septum, moderately severe pulm HTN, 2-3+ TVR, 2+ PVR. Small mobile echodensity on the anterior mitral valve leaflet  - Patient reporting taking her eliquis appropriately  - Apixaban Assay level 67  - Anti-Cardiolipin AB negative, Beta 2 Glycoprotein IGM negative  - Vascular medicine \"Overall I doubt " "current presentation represents failure of anticoagulation, considering recent worsening diarrhea which may lead to decrease in bioavailability of the medication\"  - Patient was transitioned to therapeutic lovenox regimen in MICU  - IR felt patient was not a candidate for thrombectomy based on past unsuccessful attempts  - VQ scan showing mismatched perfusion defect involving the entire right lung corresponding with recent CT findings.   - Blood cultures no growth two days  - Patient refused DVT scans while inpatient  - Patient did not wish to undergo surgery this admission and additionally was not a surgical candidate based on elevated platelet count       PLAN from DISCHARGE SUMMARY   Plan at Discharge  - Therapeutic Lovenox 60mg BID  - Vascular medicine follow-up  - Pulmonary HTN follow-up with Dr. Mendez  - Follow Von Willebrand diagnostic panel    #Essential Thrombocythemia w/ JAK2 Mutation  -Platelets 1536 on admission  -On outpatient regimen of BESREMi 100mcg every two weeks  -Refused hydroxyurea as outpatient    Plan at Discharge  - Due for next Besrembi dose 12/2/2024  - Hematology/Oncology follow-up to establish at [facility]  - Monitor on CBC as outpatient    #Ulcerative Colitis  -Over 10 years since last colonoscopy  -CRP, fecal calprotectin, C diff PCR all negative  -Patient refusing CTA A/P and KUB in MICU    Plan at Discharge  -Continue planned prednisone taper  -Colonoscopy as outpatient    #Lung Nodules  -Multiple 6-8mm nodules seen on CT chest on admission    Plan at Discharge  -Follow-up within 3-6 months with repeat imaging    #ANGELICA  -Iron 33, TIBC 377, T sat 8.8, Ferritin 33.6    Plan at Discharge  -Iron pill initiation deferred to outpatient as patient had recent UC flare  -Discuss IV versus oral repletion as outpatient \"    Today she is feeling much better if she continues to use the Lovenox injections  She was nervous at 1 point for having a \"blackout moment \"when showering the other day but " otherwise only noted some slight dizziness after this    Did look up side effects of the Lovenox and did note that it stated that confusion and dizziness could be due to this but she wishes to discuss this further    Otherwise she was much better at her virtual appointment    Review of Systems  All pertinent positive symptoms are included in the history of present illness.    All other systems have been reviewed and are negative and noncontributory to this patient's current ailments.    Objective   LMP  (LMP Unknown)     Physical Exam  CONSTITUTIONAL - well nourished, well developed, looks like stated age, in no acute distress, not ill-appearing, and not tired appearing  SKIN - normal skin color and pigmentation, normal skin turgor without rash, lesions, or nodules visualized  HEAD - no trauma, normocephalic  EYES - normal external exam  CHEST -no distressed breathing, good effort  EXTREMITIES - no edema, no deformities  NEUROLOGICAL - normal balance, normal motor, no ataxia  PSYCHIATRIC - alert, pleasant and cordial, age-appropriate    Assessment/Plan   We had a long conversation about your recent hospitalization as well as your follow-ups    I recommend that you follow-up with the pulmonologist at [facility] to continue her Lovenox per their protocol as well as follow-up for any new hematologist/oncologist due to your previous hematologist/oncologist is unable to see you since they are down in [place]    Please make an appointment for all these follow-ups at your earliest mutual convenience    Will continue following up for the BESREMi in 2 weeks when we provide your next injection which will be at 150 mcg    Otherwise, we will also see you in person at that time and have lab work performed at that time    At any point if you notice worsening or acute signs/symptoms please notify me immediately and/or go to nearest emergency room to be acutely evaluated

## 2024-12-05 NOTE — PROGRESS NOTES
Outreach call to patient to check in 30 days after hospital discharge to support smooth transition of care.  Patient with no additional needs noted. No additional outreach needed at this time.     Mya MORRISN, RN, [facility]  O: [phone]

## 2024-12-16 ENCOUNTER — APPOINTMENT (OUTPATIENT)
Dept: PRIMARY CARE | Facility: CLINIC | Age: 49
End: 2024-12-16
Payer: COMMERCIAL

## 2024-12-16 ENCOUNTER — LAB (OUTPATIENT)
Dept: LAB | Facility: LAB | Age: 49
End: 2024-12-16
Payer: COMMERCIAL

## 2024-12-16 VITALS
WEIGHT: 127.8 LBS | SYSTOLIC BLOOD PRESSURE: 118 MMHG | BODY MASS INDEX: 21.94 KG/M2 | OXYGEN SATURATION: 97 % | DIASTOLIC BLOOD PRESSURE: 80 MMHG | HEART RATE: 130 BPM

## 2024-12-16 DIAGNOSIS — K51.919 ULCERATIVE COLITIS WITH COMPLICATION, UNSPECIFIED LOCATION (MULTI): Primary | ICD-10-CM

## 2024-12-16 DIAGNOSIS — D47.3 ESSENTIAL (HEMORRHAGIC) THROMBOCYTHEMIA: ICD-10-CM

## 2024-12-16 DIAGNOSIS — R79.89 ABNORMAL CBC: ICD-10-CM

## 2024-12-16 DIAGNOSIS — I26.99 MULTIPLE PULMONARY EMBOLI (MULTI): ICD-10-CM

## 2024-12-16 DIAGNOSIS — R04.2 HEMOPTYSIS: ICD-10-CM

## 2024-12-16 DIAGNOSIS — K51.919 ULCERATIVE COLITIS WITH COMPLICATION, UNSPECIFIED LOCATION (MULTI): ICD-10-CM

## 2024-12-16 DIAGNOSIS — R00.0 TACHYCARDIA: ICD-10-CM

## 2024-12-16 LAB
ALBUMIN SERPL BCP-MCNC: 4.1 G/DL (ref 3.4–5)
ALP SERPL-CCNC: 80 U/L (ref 33–110)
ALT SERPL W P-5'-P-CCNC: 12 U/L (ref 7–45)
ANION GAP SERPL CALC-SCNC: 14 MMOL/L (ref 10–20)
APTT PPP: 35 SECONDS (ref 27–38)
AST SERPL W P-5'-P-CCNC: 13 U/L (ref 9–39)
BILIRUB SERPL-MCNC: 0.4 MG/DL (ref 0–1.2)
BUN SERPL-MCNC: 21 MG/DL (ref 6–23)
CALCIUM SERPL-MCNC: 9.2 MG/DL (ref 8.6–10.3)
CHLORIDE SERPL-SCNC: 100 MMOL/L (ref 98–107)
CO2 SERPL-SCNC: 28 MMOL/L (ref 21–32)
CREAT SERPL-MCNC: 0.75 MG/DL (ref 0.5–1.05)
EGFRCR SERPLBLD CKD-EPI 2021: >90 ML/MIN/1.73M*2
GLUCOSE SERPL-MCNC: 70 MG/DL (ref 74–99)
INR PPP: 1 (ref 0.9–1.1)
POTASSIUM SERPL-SCNC: 3.7 MMOL/L (ref 3.5–5.3)
PROT SERPL-MCNC: 7.6 G/DL (ref 6.4–8.2)
PROTHROMBIN TIME: 11.3 SECONDS (ref 9.8–12.8)
SODIUM SERPL-SCNC: 138 MMOL/L (ref 136–145)

## 2024-12-16 PROCEDURE — 36415 COLL VENOUS BLD VENIPUNCTURE: CPT

## 2024-12-16 PROCEDURE — 93000 ELECTROCARDIOGRAM COMPLETE: CPT | Performed by: STUDENT IN AN ORGANIZED HEALTH CARE EDUCATION/TRAINING PROGRAM

## 2024-12-16 PROCEDURE — 85025 COMPLETE CBC W/AUTO DIFF WBC: CPT

## 2024-12-16 PROCEDURE — 80053 COMPREHEN METABOLIC PANEL: CPT

## 2024-12-16 PROCEDURE — 99214 OFFICE O/P EST MOD 30 MIN: CPT | Performed by: STUDENT IN AN ORGANIZED HEALTH CARE EDUCATION/TRAINING PROGRAM

## 2024-12-16 PROCEDURE — 85610 PROTHROMBIN TIME: CPT

## 2024-12-16 PROCEDURE — 1036F TOBACCO NON-USER: CPT | Performed by: STUDENT IN AN ORGANIZED HEALTH CARE EDUCATION/TRAINING PROGRAM

## 2024-12-16 PROCEDURE — 96372 THER/PROPH/DIAG INJ SC/IM: CPT | Performed by: STUDENT IN AN ORGANIZED HEALTH CARE EDUCATION/TRAINING PROGRAM

## 2024-12-16 PROCEDURE — 85730 THROMBOPLASTIN TIME PARTIAL: CPT

## 2024-12-16 NOTE — PROGRESS NOTES
Subjective   Patient ID: Elise Chávez is a 49 y.o. female who presents for Hemorrhagic Thrombocythemia.    HPI   Patient is presenting into the office today for another 2-week follow-up for her continued elevated platelet cell count as well as her other chronic ailments    Ultimately she is doing better at this time and requesting that we provide another BESREMi injection    Has appointments in the near future with hematology/oncology at the [facility] as well as vascular medicine and pulmonology within the same system    She is eager to get another opinion and follow-up appropriately    Unfortunately she has been noticing signs/symptoms of which she believes are side effects due to the Lovenox  Noted that she did cough up some blood and had some nosebleeds on 12/13/2024 as well as noticing reaction at the injection site ever since she has been taking this medication    Also noted around that time she did have some chest discomfort, bilateral shoulder pain, shortness of breath, and even confusion    Stated that all of this spontaneously resolved and now she is concerned about her heart and now asking to be further evaluated/treated    She is now also asking for lab work so she can look into her bleeding time as well as her platelet cell count    She does have appointment to stated above is early as the first week of January and wishes to discuss this after today's visit    She also continues to note some minor discomfort between her shoulder blades as well as this minor shortness of breath    Asking to discuss this further      Review of Systems  All pertinent positive symptoms are included in the history of present illness.    All other systems have been reviewed and are negative and noncontributory to this patient's current ailments.    Objective   /80 (BP Location: Left arm, Patient Position: Sitting, BP Cuff Size: Adult)   Pulse (!) 130   Wt 58 kg (127 lb 12.8 oz)   LMP  (LMP Unknown)   SpO2 97%    BMI 21.94 kg/m²     Physical Exam  CONSTITUTIONAL - well nourished, well developed, looks like stated age, in no acute distress, not ill-appearing, and not tired appearing  SKIN - normal skin color and pigmentation, normal skin turgor without rash, lesions, or nodules visualized  HEAD - no trauma, normocephalic  EYES - normal external exam  CHEST -no distressed breathing, good effort  EXTREMITIES - no edema, no deformities  NEUROLOGICAL - normal balance, normal motor, no ataxia  PSYCHIATRIC - alert, pleasant and cordial, age-appropriate    Assessment/Plan   EKG continues to show sinus tachycardia without any acute changes  I did not see any ST elevation or any other changes on my read including with the read from the computer    We had another long conversation about that your heart is very strained at this time due to the chronic clots as well as you being tachycardic all the time    I did recommend you try to get into these specialty doctor appointments earlier and I understand that you have not established that you cannot send over messages  I would urge you to call their offices to see if he can be seen sooner    I also recommend that if you have the signs/symptoms again you need to notify me immediately or even go to the nearest emergency room to be acutely evaluated    I did provide the BESREMi 150 mcg injection today and we will continue monitoring closely  I did order bleeding time as well as your CBC and CMP    We will notify of the results and make recommendations accordingly

## 2024-12-17 LAB
ACANTHOCYTES BLD QL SMEAR: NORMAL
BASOPHILS # BLD AUTO: 0.26 X10*3/UL (ref 0–0.1)
BASOPHILS NFR BLD AUTO: 0.9 %
EOSINOPHIL # BLD AUTO: 0.86 X10*3/UL (ref 0–0.7)
EOSINOPHIL NFR BLD AUTO: 3 %
ERYTHROCYTE [DISTWIDTH] IN BLOOD BY AUTOMATED COUNT: 21.8 % (ref 11.5–14.5)
HCT VFR BLD AUTO: 41.7 % (ref 36–46)
HGB BLD-MCNC: 12.6 G/DL (ref 12–16)
IMM GRANULOCYTES # BLD AUTO: 0.29 X10*3/UL (ref 0–0.7)
IMM GRANULOCYTES NFR BLD AUTO: 1 % (ref 0–0.9)
LYMPHOCYTES # BLD AUTO: 7.5 X10*3/UL (ref 1.2–4.8)
LYMPHOCYTES NFR BLD AUTO: 26.5 %
MCH RBC QN AUTO: 26 PG (ref 26–34)
MCHC RBC AUTO-ENTMCNC: 30.2 G/DL (ref 32–36)
MCV RBC AUTO: 86 FL (ref 80–100)
MONOCYTES # BLD AUTO: 2.07 X10*3/UL (ref 0.1–1)
MONOCYTES NFR BLD AUTO: 7.3 %
NEUTROPHILS # BLD AUTO: 17.37 X10*3/UL (ref 1.2–7.7)
NEUTROPHILS NFR BLD AUTO: 61.3 %
NRBC BLD-RTO: 0 /100 WBCS (ref 0–0)
OVALOCYTES BLD QL SMEAR: NORMAL
PLATELET # BLD AUTO: 1535 X10*3/UL (ref 150–450)
RBC # BLD AUTO: 4.84 X10*6/UL (ref 4–5.2)
RBC MORPH BLD: NORMAL
SCHISTOCYTES BLD QL SMEAR: NORMAL
WBC # BLD AUTO: 28.4 X10*3/UL (ref 4.4–11.3)

## 2024-12-17 NOTE — RESULT ENCOUNTER NOTE
Complete blood cell count continues to show an elevation at 28  Platelet cell count is not fully improving and still remains stagnant at 1535    Kidneys and electrolytes within normal limits alongside normal liver function    Bleeding time is within normal limits as well as the PTT  I would recommend she follows up with hematology as well as the other specialty providers at the [facility] at this time

## 2024-12-30 ENCOUNTER — APPOINTMENT (OUTPATIENT)
Dept: PRIMARY CARE | Facility: CLINIC | Age: 49
End: 2024-12-30
Payer: COMMERCIAL

## 2024-12-30 ENCOUNTER — LAB (OUTPATIENT)
Dept: LAB | Facility: LAB | Age: 49
End: 2024-12-30
Payer: COMMERCIAL

## 2024-12-30 DIAGNOSIS — R79.89 ABNORMAL CBC: ICD-10-CM

## 2024-12-30 DIAGNOSIS — R00.0 TACHYCARDIA: ICD-10-CM

## 2024-12-30 DIAGNOSIS — R04.2 HEMOPTYSIS: ICD-10-CM

## 2024-12-30 DIAGNOSIS — D47.3 ESSENTIAL (HEMORRHAGIC) THROMBOCYTHEMIA: ICD-10-CM

## 2024-12-30 DIAGNOSIS — M62.830 MUSCLE SPASM OF BACK: ICD-10-CM

## 2024-12-30 DIAGNOSIS — I26.99 MULTIPLE PULMONARY EMBOLI (MULTI): ICD-10-CM

## 2024-12-30 DIAGNOSIS — M25.512 ACUTE PAIN OF LEFT SHOULDER: ICD-10-CM

## 2024-12-30 DIAGNOSIS — I26.99 MULTIPLE PULMONARY EMBOLI (MULTI): Primary | ICD-10-CM

## 2024-12-30 LAB
ACANTHOCYTES BLD QL SMEAR: NORMAL
ALBUMIN SERPL BCP-MCNC: 4 G/DL (ref 3.4–5)
ALP SERPL-CCNC: 76 U/L (ref 33–110)
ALT SERPL W P-5'-P-CCNC: 23 U/L (ref 7–45)
ANION GAP SERPL CALC-SCNC: 12 MMOL/L (ref 10–20)
APTT PPP: 35 SECONDS (ref 27–38)
AST SERPL W P-5'-P-CCNC: 24 U/L (ref 9–39)
BASOPHILS # BLD AUTO: 0.16 X10*3/UL (ref 0–0.1)
BASOPHILS NFR BLD AUTO: 0.8 %
BILIRUB SERPL-MCNC: 0.5 MG/DL (ref 0–1.2)
BUN SERPL-MCNC: 22 MG/DL (ref 6–23)
CALCIUM SERPL-MCNC: 9.7 MG/DL (ref 8.6–10.3)
CHLORIDE SERPL-SCNC: 102 MMOL/L (ref 98–107)
CO2 SERPL-SCNC: 26 MMOL/L (ref 21–32)
CREAT SERPL-MCNC: 0.77 MG/DL (ref 0.5–1.05)
EGFRCR SERPLBLD CKD-EPI 2021: >90 ML/MIN/1.73M*2
EOSINOPHIL # BLD AUTO: 0.53 X10*3/UL (ref 0–0.7)
EOSINOPHIL NFR BLD AUTO: 2.5 %
ERYTHROCYTE [DISTWIDTH] IN BLOOD BY AUTOMATED COUNT: 21 % (ref 11.5–14.5)
GLUCOSE SERPL-MCNC: 90 MG/DL (ref 74–99)
HCT VFR BLD AUTO: 41.8 % (ref 36–46)
HGB BLD-MCNC: 12.3 G/DL (ref 12–16)
IMM GRANULOCYTES # BLD AUTO: 0.21 X10*3/UL (ref 0–0.7)
IMM GRANULOCYTES NFR BLD AUTO: 1 % (ref 0–0.9)
INR PPP: 1 (ref 0.9–1.1)
LYMPHOCYTES # BLD AUTO: 5.38 X10*3/UL (ref 1.2–4.8)
LYMPHOCYTES NFR BLD AUTO: 25.6 %
MCH RBC QN AUTO: 25.3 PG (ref 26–34)
MCHC RBC AUTO-ENTMCNC: 29.4 G/DL (ref 32–36)
MCV RBC AUTO: 86 FL (ref 80–100)
MONOCYTES # BLD AUTO: 1.61 X10*3/UL (ref 0.1–1)
MONOCYTES NFR BLD AUTO: 7.7 %
NEUTROPHILS # BLD AUTO: 13.14 X10*3/UL (ref 1.2–7.7)
NEUTROPHILS NFR BLD AUTO: 62.4 %
NRBC BLD-RTO: 0 /100 WBCS (ref 0–0)
OVALOCYTES BLD QL SMEAR: NORMAL
PLATELET # BLD AUTO: 1545 X10*3/UL (ref 150–450)
POTASSIUM SERPL-SCNC: 4.2 MMOL/L (ref 3.5–5.3)
PROT SERPL-MCNC: 8.2 G/DL (ref 6.4–8.2)
PROTHROMBIN TIME: 11.2 SECONDS (ref 9.8–12.8)
RBC # BLD AUTO: 4.86 X10*6/UL (ref 4–5.2)
RBC MORPH BLD: NORMAL
SCHISTOCYTES BLD QL SMEAR: NORMAL
SODIUM SERPL-SCNC: 136 MMOL/L (ref 136–145)
WBC # BLD AUTO: 21 X10*3/UL (ref 4.4–11.3)

## 2024-12-30 PROCEDURE — 99214 OFFICE O/P EST MOD 30 MIN: CPT | Performed by: STUDENT IN AN ORGANIZED HEALTH CARE EDUCATION/TRAINING PROGRAM

## 2024-12-30 PROCEDURE — 96372 THER/PROPH/DIAG INJ SC/IM: CPT | Performed by: STUDENT IN AN ORGANIZED HEALTH CARE EDUCATION/TRAINING PROGRAM

## 2024-12-30 RX ORDER — CYCLOBENZAPRINE HCL 5 MG
5 TABLET ORAL 3 TIMES DAILY
Qty: 30 TABLET | Refills: 0 | Status: SHIPPED | OUTPATIENT
Start: 2024-12-30

## 2024-12-30 NOTE — PROGRESS NOTES
Subjective   Patient ID: Elise Chávez is a 49 y.o. female who presents for Hemorrhagic Thrombocythemia.    HPI   Patient is presenting into the office today for another 2-week follow-up for her continued elevated platelet cell count as well as her other chronic ailments    Ultimately she continues to use the Lovenox as well as her other medications as indicated the chart  She is willing to have another injection of the BESREMi at 150 mcg    She is following up with hematology/oncology Thursday of this week to further establish    Also continues to notice some back/shoulder pain that she believes are muscle spasms  This is reproducible  Asking if she can have any muscle relaxants as this helped out in the past    Lastly denies any chest pain, shortness of breath, or any other acute signs/symptoms and is eager to follow-up with all the specialty providers in the near future  Requesting lab work also to be done prior to this visit            Review of Systems  All pertinent positive symptoms are included in the history of present illness.    All other systems have been reviewed and are negative and noncontributory to this patient's current ailments.    Objective   LMP  (LMP Unknown)     Physical Exam  CONSTITUTIONAL - well nourished, well developed, looks like stated age, in no acute distress, not ill-appearing, and not tired appearing  SKIN - normal skin color and pigmentation, normal skin turgor without rash, lesions, or nodules visualized  HEAD - no trauma, normocephalic  EYES - normal external exam  CHEST -no distressed breathing, good effort  EXTREMITIES - no edema, no deformities  NEUROLOGICAL - normal balance, normal motor, no ataxia  PSYCHIATRIC - alert, pleasant and cordial, age-appropriate    Assessment/Plan     Continue following up with your specialty providers as well as your hematology/oncology provider later this week    I did provide the BESREMi 150 mcg injection today and we will continue monitoring  closely  I did order bleeding time as well as your CBC and CMP as indicated the chart and we will notify you with the results and make recommendations accordingly    For your muscle spasm/back pain I did prescribe Flexeril to use on an as-needed basis    At any point if you notice worsening or acute signs/symptoms please notify me immediately and/or go to nearest emergency room to be acutely evaluated

## 2024-12-31 NOTE — RESULT ENCOUNTER NOTE
White blood cell count continues to be elevated at 21  Platelet cell count continues to be elevated as well at 1545 with an elevated neutrophil count as well as a lymphocyte count    PT/INR well within normal limits and the PTT also within normal limits    Sugar, kidneys, liver, electrolytes within normal limits    I would recommend she discusses this further with the hematology consult at [facility]

## 2025-01-13 ENCOUNTER — LAB (OUTPATIENT)
Dept: LAB | Facility: LAB | Age: 50
End: 2025-01-13
Payer: COMMERCIAL

## 2025-01-13 ENCOUNTER — APPOINTMENT (OUTPATIENT)
Dept: PRIMARY CARE | Facility: CLINIC | Age: 50
End: 2025-01-13
Payer: COMMERCIAL

## 2025-01-13 VITALS
HEART RATE: 148 BPM | WEIGHT: 131.8 LBS | BODY MASS INDEX: 22.62 KG/M2 | DIASTOLIC BLOOD PRESSURE: 70 MMHG | OXYGEN SATURATION: 96 % | SYSTOLIC BLOOD PRESSURE: 102 MMHG

## 2025-01-13 DIAGNOSIS — I27.82 CHRONIC PULMONARY EMBOLISM WITH ACUTE COR PULMONALE, UNSPECIFIED PULMONARY EMBOLISM TYPE (MULTI): ICD-10-CM

## 2025-01-13 DIAGNOSIS — I26.09 CHRONIC PULMONARY EMBOLISM WITH ACUTE COR PULMONALE, UNSPECIFIED PULMONARY EMBOLISM TYPE (MULTI): ICD-10-CM

## 2025-01-13 DIAGNOSIS — R00.0 TACHYCARDIA: ICD-10-CM

## 2025-01-13 DIAGNOSIS — R79.89 ABNORMAL CBC: ICD-10-CM

## 2025-01-13 DIAGNOSIS — D47.3 ESSENTIAL (HEMORRHAGIC) THROMBOCYTHEMIA: ICD-10-CM

## 2025-01-13 DIAGNOSIS — I27.24 CTEPH (CHRONIC THROMBOEMBOLIC PULMONARY HYPERTENSION): ICD-10-CM

## 2025-01-13 DIAGNOSIS — R04.2 HEMOPTYSIS: ICD-10-CM

## 2025-01-13 DIAGNOSIS — R79.89 ABNORMAL CBC: Primary | ICD-10-CM

## 2025-01-13 DIAGNOSIS — K51.90 ULCERATIVE COLITIS WITHOUT COMPLICATIONS, UNSPECIFIED LOCATION (MULTI): ICD-10-CM

## 2025-01-13 LAB
ALBUMIN SERPL BCP-MCNC: 3.9 G/DL (ref 3.4–5)
ALP SERPL-CCNC: 82 U/L (ref 33–110)
ALT SERPL W P-5'-P-CCNC: 12 U/L (ref 7–45)
ANION GAP SERPL CALC-SCNC: 10 MMOL/L (ref 10–20)
APTT PPP: 35 SECONDS (ref 27–38)
AST SERPL W P-5'-P-CCNC: 15 U/L (ref 9–39)
BASOPHILS # BLD AUTO: 0.1 X10*3/UL (ref 0–0.1)
BASOPHILS NFR BLD AUTO: 0.6 %
BILIRUB SERPL-MCNC: 0.4 MG/DL (ref 0–1.2)
BUN SERPL-MCNC: 13 MG/DL (ref 6–23)
CALCIUM SERPL-MCNC: 9.1 MG/DL (ref 8.6–10.3)
CHLORIDE SERPL-SCNC: 106 MMOL/L (ref 98–107)
CO2 SERPL-SCNC: 26 MMOL/L (ref 21–32)
CREAT SERPL-MCNC: 0.78 MG/DL (ref 0.5–1.05)
EGFRCR SERPLBLD CKD-EPI 2021: >90 ML/MIN/1.73M*2
EOSINOPHIL # BLD AUTO: 0.57 X10*3/UL (ref 0–0.7)
EOSINOPHIL NFR BLD AUTO: 3.6 %
ERYTHROCYTE [DISTWIDTH] IN BLOOD BY AUTOMATED COUNT: 19 % (ref 11.5–14.5)
GLUCOSE SERPL-MCNC: 88 MG/DL (ref 74–99)
HCT VFR BLD AUTO: 39.4 % (ref 36–46)
HGB BLD-MCNC: 11.6 G/DL (ref 12–16)
IMM GRANULOCYTES # BLD AUTO: 0.09 X10*3/UL (ref 0–0.7)
IMM GRANULOCYTES NFR BLD AUTO: 0.6 % (ref 0–0.9)
INR PPP: 1.1 (ref 0.9–1.1)
LYMPHOCYTES # BLD AUTO: 4.25 X10*3/UL (ref 1.2–4.8)
LYMPHOCYTES NFR BLD AUTO: 26.9 %
MCH RBC QN AUTO: 24.8 PG (ref 26–34)
MCHC RBC AUTO-ENTMCNC: 29.4 G/DL (ref 32–36)
MCV RBC AUTO: 84 FL (ref 80–100)
MONOCYTES # BLD AUTO: 1.1 X10*3/UL (ref 0.1–1)
MONOCYTES NFR BLD AUTO: 7 %
NEUTROPHILS # BLD AUTO: 9.69 X10*3/UL (ref 1.2–7.7)
NEUTROPHILS NFR BLD AUTO: 61.3 %
NRBC BLD-RTO: 0 /100 WBCS (ref 0–0)
PLATELET # BLD AUTO: 1148 X10*3/UL (ref 150–450)
POTASSIUM SERPL-SCNC: 4 MMOL/L (ref 3.5–5.3)
PROT SERPL-MCNC: 7.5 G/DL (ref 6.4–8.2)
PROTHROMBIN TIME: 12.6 SECONDS (ref 9.8–12.8)
RBC # BLD AUTO: 4.68 X10*6/UL (ref 4–5.2)
SODIUM SERPL-SCNC: 138 MMOL/L (ref 136–145)
WBC # BLD AUTO: 15.8 X10*3/UL (ref 4.4–11.3)

## 2025-01-13 PROCEDURE — 1036F TOBACCO NON-USER: CPT | Performed by: STUDENT IN AN ORGANIZED HEALTH CARE EDUCATION/TRAINING PROGRAM

## 2025-01-13 PROCEDURE — 99214 OFFICE O/P EST MOD 30 MIN: CPT | Performed by: STUDENT IN AN ORGANIZED HEALTH CARE EDUCATION/TRAINING PROGRAM

## 2025-01-13 PROCEDURE — 80053 COMPREHEN METABOLIC PANEL: CPT

## 2025-01-13 PROCEDURE — 85025 COMPLETE CBC W/AUTO DIFF WBC: CPT

## 2025-01-13 PROCEDURE — 85730 THROMBOPLASTIN TIME PARTIAL: CPT

## 2025-01-13 PROCEDURE — 85610 PROTHROMBIN TIME: CPT

## 2025-01-13 NOTE — PROGRESS NOTES
Subjective   Patient ID: Elise Chávez is a 49 y.o. female who presents for Follow-up.    HPI   Patient is presenting into the office today for another 2-week follow-up for her continued elevated platelet cell count as well as her other chronic ailments    Ultimately she continues to use the Lovenox as well as her other medications as indicated the chart but did note that she has taken a couple injections off due to pain in the injection site around her stomach    She does not have her Besremi Injection today    She is following up with hematology/oncology    Also continues to notice some back/shoulder pain that she believes are muscle spasms  This is reproducible and was given Flexeril at her last visit which has provided some relief.  Continues to have some slight symptoms but wishes discuss this briefly    Lastly denies any chest pain, shortness of breath, or any other acute signs/symptoms and is eager to follow-up with all the specialty providers in the near future    Requesting lab work to be done after today's visit    Per Hematology/Oncology note, they discussed the importance of getting a bone marrow biopsy to know her exact diagnosis. Given her leukocytosis Hematologist/Oncologist worries she has myelofibrosis.  If she has myelofibrosis they can offer her Joan depending on her myeloproliferative neoplasm symptoms score.  Unfortunately she does not wish to do this testing and wishes to continue to follow-up with Dr. Rigoberto whitehead in [place] but states that it is difficult since he cannot do any telemedicine visits at this time across state lines    She does not want to use hydroxyurea because of side effects.        Review of Systems  All pertinent positive symptoms are included in the history of present illness.    All other systems have been reviewed and are negative and noncontributory to this patient's current ailments.    Objective   /70 (BP Location: Left arm, Patient Position: Sitting, BP Cuff  Size: Adult)   Pulse (!) 148   Wt 59.8 kg (131 lb 12.8 oz)   LMP  (LMP Unknown)   SpO2 96%   BMI 22.62 kg/m²     Physical Exam  CONSTITUTIONAL - well nourished, well developed, looks like stated age, in no acute distress, not ill-appearing, and not tired appearing  SKIN - normal skin color and pigmentation, normal skin turgor without rash, lesions, or nodules visualized  HEAD - no trauma, normocephalic  EYES - normal external exam  CHEST -no distressed breathing, good effort, heart tachycardic, lungs good auscultation bilaterally  EXTREMITIES - no edema, no deformities  NEUROLOGICAL - normal balance, normal motor, no ataxia  PSYCHIATRIC - alert, pleasant and cordial, age-appropriate    Assessment/Plan   I did order bleeding time as well as your CBC and CMP as indicated the chart and we will notify you with the results and make recommendations accordingly  Continue to follow up with Hematology/Oncology, Pulmonary, and GI specialists.  Return for Besremi injections when you get these in/delivered    For your muscle spasm/back pain continue the flexeril    At any point if you notice worsening or acute signs/symptoms please notify me immediately and/or go to nearest emergency room to be acutely evaluated    We did have another long conversation about your whole situation I truly recommend that you consider getting the bone biopsy as this would provide further data about your condition    Otherwise, please keep me in the loop

## 2025-01-14 NOTE — RESULT ENCOUNTER NOTE
Complete blood cell count shows a lower white blood cell count at 15.8 as well as a lower platelet cell count at 1148    Neutrophil count also improved    Pro time/INR within normal limits and a PTT also within normal limits    Sugar, kidneys, liver, electrolytes are all within normal limits    Please continue following up with your specialty providers and let us know when you get your injection so we can provide this in the office

## 2025-01-15 ENCOUNTER — OFFICE VISIT (OUTPATIENT)
Dept: PRIMARY CARE | Facility: CLINIC | Age: 50
End: 2025-01-15
Payer: COMMERCIAL

## 2025-01-15 DIAGNOSIS — I26.99 MULTIPLE PULMONARY EMBOLI (MULTI): Primary | ICD-10-CM

## 2025-01-15 DIAGNOSIS — R79.89 ABNORMAL CBC: ICD-10-CM

## 2025-01-15 DIAGNOSIS — D47.3 ESSENTIAL (HEMORRHAGIC) THROMBOCYTHEMIA: ICD-10-CM

## 2025-01-15 PROCEDURE — 96372 THER/PROPH/DIAG INJ SC/IM: CPT | Performed by: STUDENT IN AN ORGANIZED HEALTH CARE EDUCATION/TRAINING PROGRAM

## 2025-01-15 NOTE — PROGRESS NOTES
Besremi Injection provided by the patient was administered in the office today by Cesilia Andrade MA without complication.

## 2025-01-27 ENCOUNTER — APPOINTMENT (OUTPATIENT)
Dept: PRIMARY CARE | Facility: CLINIC | Age: 50
End: 2025-01-27
Payer: COMMERCIAL

## 2025-01-27 VITALS
OXYGEN SATURATION: 95 % | BODY MASS INDEX: 23.17 KG/M2 | SYSTOLIC BLOOD PRESSURE: 112 MMHG | WEIGHT: 135 LBS | DIASTOLIC BLOOD PRESSURE: 70 MMHG | HEART RATE: 140 BPM

## 2025-01-27 DIAGNOSIS — R79.89 ABNORMAL CBC: ICD-10-CM

## 2025-01-27 DIAGNOSIS — I26.99 MULTIPLE PULMONARY EMBOLI (MULTI): Primary | ICD-10-CM

## 2025-01-27 DIAGNOSIS — D47.3 ESSENTIAL (HEMORRHAGIC) THROMBOCYTHEMIA: ICD-10-CM

## 2025-01-27 DIAGNOSIS — R04.2 HEMOPTYSIS: ICD-10-CM

## 2025-01-27 DIAGNOSIS — I27.24 CTEPH (CHRONIC THROMBOEMBOLIC PULMONARY HYPERTENSION): ICD-10-CM

## 2025-01-27 PROCEDURE — 99214 OFFICE O/P EST MOD 30 MIN: CPT | Performed by: STUDENT IN AN ORGANIZED HEALTH CARE EDUCATION/TRAINING PROGRAM

## 2025-01-27 PROCEDURE — 96372 THER/PROPH/DIAG INJ SC/IM: CPT | Performed by: STUDENT IN AN ORGANIZED HEALTH CARE EDUCATION/TRAINING PROGRAM

## 2025-01-27 PROCEDURE — 1036F TOBACCO NON-USER: CPT | Performed by: STUDENT IN AN ORGANIZED HEALTH CARE EDUCATION/TRAINING PROGRAM

## 2025-01-27 NOTE — PROGRESS NOTES
Subjective   Patient ID: Elise Chávez is a 49 y.o. female who presents for Multiple Pulmonary Emboli.    HPI   Patient presenting for another 2-week follow-up due to her elevated platelet cell count as well as to receive her BESREMi injection    She continues to take Lovenox as indicated the chart and even mention that she will be following up with her new vascular provider at the [facility] later this week to discuss this further    Continues to keep in contact with her hematology/oncology provider down in [place] as she did not like the opinions of other providers up here    Ultimately, she is very hopeful and confident that the BESREMi will be helping as her last platelet cell count was the best on file    Ultimately, willing and wishing to get lab work performed today as well as discuss this in the near future    Review of Systems  All pertinent positive symptoms are included in the history of present illness.    All other systems have been reviewed and are negative and noncontributory to this patient's current ailments.    Objective   /70 (BP Location: Left arm, Patient Position: Sitting, BP Cuff Size: Adult)   Pulse (!) 140   Wt 61.2 kg (135 lb)   LMP  (LMP Unknown)   SpO2 95%   BMI 23.17 kg/m²     Physical Exam  CONSTITUTIONAL - well nourished, well developed, looks like stated age, in no acute distress, not ill-appearing, and not tired appearing  SKIN - normal skin color and pigmentation, normal skin turgor without rash, lesions, or nodules visualized  HEAD - no trauma, normocephalic  EYES - normal external exam  CHEST -no distressed breathing, good effort  EXTREMITIES - no edema, no deformities  NEUROLOGICAL - normal balance, normal motor, no ataxia  PSYCHIATRIC - alert, pleasant and cordial, age-appropriate    Assessment/Plan   Lab work ordered to be done after this appointment and we will notify with the results and make recommendations accordingly    Please continue following up with all  of your specialty providers including the vascular specialty doctor on Wednesday of this week    We also provided a BESREMi injection as indicated in the chart    Lastly, we will follow-up closely and monitor your signs/symptoms within the next 1-2 weeks

## 2025-01-28 LAB
ALBUMIN SERPL-MCNC: 4.3 G/DL (ref 3.6–5.1)
ALP SERPL-CCNC: 110 U/L (ref 31–125)
ALT SERPL-CCNC: 12 U/L (ref 6–29)
ANION GAP SERPL CALCULATED.4IONS-SCNC: 10 MMOL/L (CALC) (ref 7–17)
APTT PPP: 33 SEC (ref 23–32)
AST SERPL-CCNC: 17 U/L (ref 10–35)
BASOPHILS # BLD AUTO: 123 CELLS/UL (ref 0–200)
BASOPHILS NFR BLD AUTO: 0.8 %
BILIRUB SERPL-MCNC: 0.5 MG/DL (ref 0.2–1.2)
BUN SERPL-MCNC: 15 MG/DL (ref 7–25)
CALCIUM SERPL-MCNC: 9.3 MG/DL (ref 8.6–10.2)
CHLORIDE SERPL-SCNC: 104 MMOL/L (ref 98–110)
CO2 SERPL-SCNC: 26 MMOL/L (ref 20–32)
CREAT SERPL-MCNC: 0.83 MG/DL (ref 0.5–0.99)
EGFRCR SERPLBLD CKD-EPI 2021: 87 ML/MIN/1.73M2
EOSINOPHIL # BLD AUTO: 477 CELLS/UL (ref 15–500)
EOSINOPHIL NFR BLD AUTO: 3.1 %
ERYTHROCYTE [DISTWIDTH] IN BLOOD BY AUTOMATED COUNT: 17.2 % (ref 11–15)
GLUCOSE SERPL-MCNC: 94 MG/DL (ref 65–139)
HCT VFR BLD AUTO: 39.3 % (ref 35–45)
HGB BLD-MCNC: 11.9 G/DL (ref 11.7–15.5)
INR PPP: 1.1
LYMPHOCYTES # BLD AUTO: 4266 CELLS/UL (ref 850–3900)
LYMPHOCYTES NFR BLD AUTO: 27.7 %
MCH RBC QN AUTO: 24.9 PG (ref 27–33)
MCHC RBC AUTO-ENTMCNC: 30.3 G/DL (ref 32–36)
MCV RBC AUTO: 82.2 FL (ref 80–100)
MONOCYTES # BLD AUTO: 1016 CELLS/UL (ref 200–950)
MONOCYTES NFR BLD AUTO: 6.6 %
NEUTROPHILS # BLD AUTO: 9517 CELLS/UL (ref 1500–7800)
NEUTROPHILS NFR BLD AUTO: 61.8 %
PLATELET # BLD AUTO: 1036 THOUSAND/UL (ref 140–400)
PMV BLD REES-ECKER: 9.9 FL (ref 7.5–12.5)
POTASSIUM SERPL-SCNC: 4.8 MMOL/L (ref 3.5–5.3)
PROT SERPL-MCNC: 7.9 G/DL (ref 6.1–8.1)
PROTHROMBIN TIME: 11.6 SEC (ref 9–11.5)
RBC # BLD AUTO: 4.78 MILLION/UL (ref 3.8–5.1)
SERVICE CMNT-IMP: ABNORMAL
SODIUM SERPL-SCNC: 140 MMOL/L (ref 135–146)
WBC # BLD AUTO: 15.4 THOUSAND/UL (ref 3.8–10.8)

## 2025-01-28 NOTE — RESULT ENCOUNTER NOTE
Complete blood cell count shows an improving platelet cell count now at the lowest on file in quite some time at 1036  White blood cell count still slightly elevated but we will continue monitoring this closely    Bleeding time slightly elevated in regards to PT and A PTT but this could be due to her being on the medication/Lovenox    Liver, kidneys, electrolytes are all within normal limits

## 2025-01-28 NOTE — PROGRESS NOTES
09/12/24 0759   Current Planned Discharge Disposition   Current Planned Discharge Disposition Home        [facility]   General & Laparoscopic Surgery  [employer]       HPI:     Shaista Rojas is a very pleasant 42 y.o. obese female , Body mass index is 36.78 kg/m².. And multiple medical problems who is presenting for bariatric follow up care.   Shaista Rojas is s/p laparoscopic sleeve gastrectomy by me   Comes today to the clinic without any complaints. Patient denies any nausea, vomiting, fevers, chills, shortness of breath, chest pain, constipation or urinary symptoms. Denies any heartburn nor dysphagia.   Patient is feeling very well, and is very active. Patient is very pleased with the weight loss and resolution of co-morbid conditions.        Past Medical History:   Diagnosis Date    Anxiety     Diabetes mellitus type 2 in obese     Essential hypertension     Metabolic syndrome     Obstructive sleep apnea     uses cpap    Pre-operative clearance      Past Surgical History:   Procedure Laterality Date    ANKLE SURGERY Left 11/1996    Dr. Matta/ [facility]    ENDOMETRIAL ABLATION  03/2022    Dr. Benavidez/ [facility]    ENDOSCOPY, COLON, DIAGNOSTIC      HYSTERECTOMY (CERVIX STATUS UNKNOWN)  12/2022    Dr. Benavidez/ [facility]    KNEE SURGERY Left 05/1997    Dr. Matta/ [facility]    SLEEVE GASTRECTOMY N/A 11/21/2023    ROBOTIC ASSISTED LAPAROSCOPIC SLEEVE GASTRECTOMY performed by Drew Tripp DO at [facility] OR    TUBAL LIGATION  06/2014    Dr. Thomas/ [facility]    UPPER GASTROINTESTINAL ENDOSCOPY N/A 09/11/2023    EGD BIOPSY performed by Drew Tripp DO at [facility] ENDOSCOPY     Family History   Problem Relation Age of Onset    Arthritis Mother     Depression Mother     Mental Illness Mother     Breast Cancer Mother     Cancer Mother     Diabetes Mother     Elevated Lipids Mother     Hypertension Mother     Cancer Father     Stroke Paternal Grandmother     Alcohol Abuse Paternal Grandfather     Stroke Paternal Grandfather     Diabetes Paternal Aunt     Breast Cancer Maternal

## 2025-02-06 ENCOUNTER — APPOINTMENT (OUTPATIENT)
Dept: GASTROENTEROLOGY | Facility: CLINIC | Age: 50
End: 2025-02-06
Payer: COMMERCIAL

## 2025-02-06 VITALS — HEIGHT: 64 IN | HEART RATE: 145 BPM | BODY MASS INDEX: 22.53 KG/M2 | WEIGHT: 132 LBS

## 2025-02-06 DIAGNOSIS — K51.00 CHRONIC PANCOLONIC ULCERATIVE COLITIS (MULTI): Primary | ICD-10-CM

## 2025-02-06 DIAGNOSIS — Z12.11 COLON CANCER SCREENING: ICD-10-CM

## 2025-02-06 PROCEDURE — 99215 OFFICE O/P EST HI 40 MIN: CPT | Performed by: INTERNAL MEDICINE

## 2025-02-06 PROCEDURE — 3008F BODY MASS INDEX DOCD: CPT | Performed by: INTERNAL MEDICINE

## 2025-02-06 PROCEDURE — 1036F TOBACCO NON-USER: CPT | Performed by: INTERNAL MEDICINE

## 2025-02-06 RX ORDER — CYCLOBENZAPRINE HCL 10 MG
10 TABLET ORAL NIGHTLY PRN
COMMUNITY

## 2025-02-06 ASSESSMENT — ENCOUNTER SYMPTOMS
NERVOUS/ANXIOUS: 0
MYALGIAS: 0
FEVER: 0
CHILLS: 0
SORE THROAT: 0
BRUISES/BLEEDS EASILY: 0
SHORTNESS OF BREATH: 1
HEADACHES: 0
FATIGUE: 0
EYE REDNESS: 0
UNEXPECTED WEIGHT CHANGE: 0
ADENOPATHY: 0
ARTHRALGIAS: 0
ROS GI COMMENTS: AS PER HPI
DIFFICULTY URINATING: 0

## 2025-02-06 NOTE — PATIENT INSTRUCTIONS
We will check a stool calprotectin level to follow up on the one checked in the hospital.  I would recommend a colonoscopy as well (you can stay on Lovenox for this) - call the office when you want to schedule this.    I am not sure that mesalamine is a strong enough medicine to control your colitis, but we can see based on the stool test and colonoscopy.

## 2025-02-06 NOTE — PROGRESS NOTES
Assessment/Plan    Elise Chávez is a 49 y.o. female who presents to GI clinic to establish care for her ulcerative colitis.    Her ulcerative colitis was diagnosed in 2013 on colonoscopy.  Since then she has not been on any traditional treatment for UC, preferring to approach her disease holistically.  She states that she had been seeing a chiropractor who would give her supplements, and she also did reiki which would help with her disease. Her mother was on Remicade at one point for UC and eventually developed breast cancer - patient blames the breast cancer on the Remicade so is hesitant to take any medications for her UC for fear of side effects.  She was admitted to [facility] in November with UC flare (calprotectin >3000) and was given steroids - she did take this and finished the taper around a month ago.  Currently she feels better but still does not feel back to normal.    We discussed that we need to assess for the ongoing severity of her UC and also try to effectively treat the UC.  She has not had a colonoscopy in around 12 years, so we discussed repeating this to gauge how her UC is doing after the prednisone and also to screen for dysplasia and cancer.  I reviewed the potential risks of having untreated UC, including the risk of colon cancer, strictures, and bowel obstructions.  We discussed that sometimes symptoms do not correlate with endoscopic severity, so even if she feels pretty good she may still have active colitis.  We discussed that I do not have any naturopathic remedies or supplements that I can offer her for UC treatment, and that my recommendations will be to take medications.  However the most important thing is to get her colitis under control, so if she is able to do this with less traditional methods then I do not have a problem with that.    For now, we will check a fecal calprotectin to compare to her lab from November.  I have also ordered a colonoscopy (it is OK to continue her Lovenox  "for the colonoscopy since stopping it would be too risky) - she states she is not mentally ready for this right now but promised she would call to schedule this.  Once we have an assessment of her colitis I will likely make some medication recommendations, although after our meeting today I am hoping that we will be able to come to an agreement about UC therapy.         Subjective     History of Present Illness   Elise Chávez is a 49 y.o. 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  Mother has colitis, took Remicade, developed breast cancer - patient blames Remicade for the breast cancer - mother is now on Skyrizi  \"I avoid the chemical stuff\" - \"I do prefer natural medicine\"  She does reiki treatments that she states gets her in remission  She describes reactions to certain foods that can trigger colitis  Had Salmonella infection in 2019 and has had problems after eating certain foods since then  Was told she can't have a colonoscopy since she can't sop her blood thinner  She states that she has long-COVID and this still causes issues  At [facility] she was given steroid and could tell inflammation got better, did get Flagyl also in the hospital which helped  She did complete the prednisone taper, which she hates, finished 1/2/25  Platelet count started out at 1,800 most recently was 1,000 which she states is good for her  Currently she feels OK, gradually re-introducing vegetables  Stools are formed, she gained weight, thinks things are better but not in remission  Has a couple stools per day  No blood in stools  She had a bad experience with her last colonoscopy - woke up during the procedure and was furious  \"I " "don't know if I want to go through a colonoscopy right now\" - wants to let body calm down after her hospitalizations and doctors visits  She is not against the idea of taking medication for her UC but is wary of medication since she often gets side effects from medicines and because he believes that her mom's breast cancer was related to Remicade use    Chart review:  Was at [facility] in November due to diarrhea, abdominal pain, nausea  Diagnosed with UC at colonoscopy in 2013, was not on medication (managed with chiropractor - natural supplements and acupuncture)  She had diffuse pancolitis on CT, calprotectin >3000  Was given steroids  She is supposed to be on Lovenox BID, however was taking 1-2 times per week  Had colonoscopy scheduled with me last October but canceled    Past Medical History  As per HPI.     Social History  she  reports that she has never smoked. She has never used smokeless tobacco.     Family History  Father had colon cancer and mother has colitis    Review of Systems  Review of Systems   Constitutional:  Negative for chills, fatigue, fever and unexpected weight change.   HENT:  Negative for sore throat.    Eyes:  Negative for redness and visual disturbance.   Respiratory:  Positive for shortness of breath.    Cardiovascular:  Negative for chest pain.   Gastrointestinal:         As per HPI   Genitourinary:  Negative for difficulty urinating.   Musculoskeletal:  Negative for arthralgias and myalgias.   Skin:  Negative for rash.   Neurological:  Negative for headaches.   Hematological:  Negative for adenopathy. Does not bruise/bleed easily.   Psychiatric/Behavioral:  The patient is not nervous/anxious.    All other systems reviewed and are negative.      Allergies  No Known Allergies    Medications  Current Outpatient Medications   Medication Instructions    Besremi 100 mcg, Every 14 days    cyclobenzaprine (FLEXERIL) 5 mg, oral, 3 times daily    cyclobenzaprine (FLEXERIL) 10 mg, Nightly PRN    " "enoxaparin (LOVENOX) 57.5 mg, Every 12 hours    pantoprazole (ProtoNix) 40 mg EC tablet Take 1 tablet (40 mg) by mouth once daily in the morning. Take before meals. Do not crush, chew, or split.        Objective     Visit Vitals  Pulse (!) 145   Ht 1.632 m (5' 4.25\")   Wt 59.9 kg (132 lb)   LMP  (LMP Unknown)   BMI 22.48 kg/m²   OB Status Postmenopausal   Smoking Status Never   BSA 1.65 m²       Physical Exam  Constitutional:       General: She is not in acute distress.  HENT:      Mouth/Throat:      Mouth: Mucous membranes are moist.   Eyes:      Extraocular Movements: Extraocular movements intact.   Cardiovascular:      Rate and Rhythm: Normal rate and regular rhythm.   Pulmonary:      Breath sounds: Normal breath sounds.   Abdominal:      General: Bowel sounds are normal. There is no distension.      Palpations: Abdomen is soft.      Tenderness: There is abdominal tenderness. There is no guarding or rebound.      Comments: Tender across upper abdomen to palpation   Musculoskeletal:         General: No swelling.   Skin:     General: Skin is warm and dry.   Neurological:      General: No focal deficit present.      Mental Status: She is alert.   Psychiatric:         Mood and Affect: Mood normal.         Behavior: Behavior normal.           Lab Results   Component Value Date    WBC 15.4 (H) 01/27/2025    WBC 15.8 (H) 01/13/2025    HGB 11.9 01/27/2025    HGB 11.6 (L) 01/13/2025    HCT 39.3 01/27/2025    HCT 39.4 01/13/2025    MCV 82.2 01/27/2025    MCV 84 01/13/2025    PLT 1,036 (H) 01/27/2025    PLT 1,148 (H) 01/13/2025     Lab Results   Component Value Date     01/27/2025     01/13/2025    K 4.8 01/27/2025    K 4.0 01/13/2025     01/27/2025     01/13/2025    CO2 26 01/27/2025    CO2 26 01/13/2025    BUN 15 01/27/2025    BUN 13 01/13/2025    CREATININE 0.83 01/27/2025    CREATININE 0.78 01/13/2025    CALCIUM 9.3 01/27/2025    CALCIUM 9.1 01/13/2025    PROT 7.9 01/27/2025    PROT 7.5 " 01/13/2025    BILITOT 0.5 01/27/2025    BILITOT 0.4 01/13/2025    ALKPHOS 110 01/27/2025    ALKPHOS 82 01/13/2025    ALT 12 01/27/2025    ALT 12 01/13/2025    AST 17 01/27/2025    AST 15 01/13/2025    GLUCOSE 94 01/27/2025    GLUCOSE 88 01/13/2025       Recent Imaging    CT ABDOMEN PELVIS W IV CONTRAST;  10/31/2024 10:01 pm      INDICATION:  Signs/Symptoms:abdominal pain, nausea, diarrhea.          COMPARISON:  05/14/2019      ACCESSION NUMBER(S):  [id number]      ORDERING CLINICIAN:  KAYLEEN ENGLAND      TECHNIQUE:  Contiguous axial images of the abdomen and pelvis were obtained after  the intravenous administration of iodinated contrast. Coronal and  sagittal reformatted images were reconstructed from the axial data.      FINDINGS:  LOWER CHEST: See separate report.          ABDOMEN/PELVIS:      ABDOMINAL WALL: No significant abnormality.      LIVER: There is enlarged measuring 20.3 cm in length. There are no  focal parenchymal abnormalities. There is mild periportal edema.      BILE DUCTS: No significant intrahepatic or extrahepatic dilatation.      GALLBLADDER: No significant abnormality.      PANCREAS: No significant abnormality.      SPLEEN: No significant abnormality.      ADRENALS: No significant abnormality.      KIDNEYS, URETERS, BLADDER: No significant abnormality.      REPRODUCTIVE ORGANS: No significant abnormality.      VESSELS: Mild aortic atherosclerosis without AAA.      RETROPERITONEUM/LYMPH NODES: No acute retroperitoneal abnormality.  Numerous reactive pericolonic lymph nodes are noted.      BOWEL/MESENTERY/PERITONEUM: There is diffuse inflammation and wall  thickening of the rectum, sigmoid colon, descending colon, transverse  colon, and majority of the ascending colon. This is most pronounced  in the mid transverse colon and there is relative sparing of the  cecum. This is progressed in the transverse and ascending colon  compared to prior study. The colon demonstrates diffuse  featureless  appearance through the transverse, descending, and sigmoid colon and  rectum. There is relative preservation of haustral anatomy in the  ascending colon. There are no fistulas or other penetrating disease.  The colon is nondilated. The small bowel is noninflamed and  nondilated. The stomach appears within normal limits. The appendix is  not identified; however, there are no pericecal inflammatory changes.      No ascites, free air, or fluid collection.          MUSCULOSKELETAL: No acute osseous abnormality. No suspicious osseous  lesion.       Impression   Diffuse active inflammation of the colon contiguously extending from  the rectum to the ascending colon which is significantly progressed  in the transverse colon, again demonstrating characteristic  featureless appearance consistent with known ulcerative colitis.      No other acute abnormalities in the abdomen or pelvis.      Please see separate report of the chest for pulmonary findings.           Bunny Bullard MD      Time Spent  Prep time on day of patient encounter: 7 minutes  Time spent directly with patient, family or caregiver: 50 minutes  Additional Time Spent on Patient Care Activities: 0 minutes  Documentation Time: 10 minutes  Other Time Spent: 0 minutes  Total: 67 minutes

## 2025-02-06 NOTE — LETTER
February 6, 2025     aCm Cazares DO  [address]  [facility], [address]  [facility] [address]    Patient: Elise Chávez   YOB: 1975   Date of Visit: 2/6/2025       Dear Dr. Cam Cazares DO:    I saw Elise Chávez today for GI evaluation regarding her ulcerative colitis. Below are the relevant portions of my assessment and plan of care.    Assessment / Plan:    Her ulcerative colitis was diagnosed in 2013 on colonoscopy.  Since then she has not been on any traditional treatment for UC, preferring to approach her disease holistically.  She states that she had been seeing a chiropractor who would give her supplements, and she also did reiki which would help with her disease. Her mother was on Remicade at one point for UC and eventually developed breast cancer - patient blames the breast cancer on the Remicade so is hesitant to take any medications for her UC for fear of side effects.  She was admitted to [facility] in November with UC flare (calprotectin >3000) and was given steroids - she did take this and finished the taper around a month ago.  Currently she feels better but still does not feel back to normal.    We discussed that we need to assess for the ongoing severity of her UC and also try to effectively treat the UC.  She has not had a colonoscopy in around 12 years, so we discussed repeating this to gauge how her UC is doing after the prednisone and also to screen for dysplasia and cancer.  I reviewed the potential risks of having untreated UC, including the risk of colon cancer, strictures, and bowel obstructions.  We discussed that sometimes symptoms do not correlate with endoscopic severity, so even if she feels pretty good she may still have active colitis.  We discussed that I do not have any naturopathic remedies or supplements that I can offer her for UC treatment, and that my recommendations will be to take medications.  However the most important thing is to get her  colitis under control, so if she is able to do this with less traditional methods then I do not have a problem with that.    For now, we will check a fecal calprotectin to compare to her lab from November.  I have also ordered a colonoscopy (it is OK to continue her Lovenox for the colonoscopy since stopping it would be too risky) - she states she is not mentally ready for this right now but promised she would call to schedule this.  Once we have an assessment of her colitis I will likely make some medication recommendations, although after our meeting today I am hoping that we will be able to come to an agreement about UC therapy.     If you have questions, please do not hesitate to reach out to me.         Sincerely,        Bunny Bullard MD  [facility]  Clinical , [employer]  Senior Attending Physician, Gastroenterology  [facility]  P: [phone]   F: [phone]

## 2025-02-10 ENCOUNTER — APPOINTMENT (OUTPATIENT)
Dept: PRIMARY CARE | Facility: CLINIC | Age: 50
End: 2025-02-10
Payer: COMMERCIAL

## 2025-02-10 DIAGNOSIS — I27.24 CTEPH (CHRONIC THROMBOEMBOLIC PULMONARY HYPERTENSION): ICD-10-CM

## 2025-02-10 DIAGNOSIS — D47.3 ESSENTIAL (HEMORRHAGIC) THROMBOCYTHEMIA: ICD-10-CM

## 2025-02-10 DIAGNOSIS — R79.89 ABNORMAL CBC: ICD-10-CM

## 2025-02-10 DIAGNOSIS — I26.99 MULTIPLE PULMONARY EMBOLI (MULTI): Primary | ICD-10-CM

## 2025-02-10 PROCEDURE — 96372 THER/PROPH/DIAG INJ SC/IM: CPT | Performed by: STUDENT IN AN ORGANIZED HEALTH CARE EDUCATION/TRAINING PROGRAM

## 2025-02-24 ENCOUNTER — APPOINTMENT (OUTPATIENT)
Dept: PRIMARY CARE | Facility: CLINIC | Age: 50
End: 2025-02-24
Payer: COMMERCIAL

## 2025-02-24 VITALS
BODY MASS INDEX: 22.82 KG/M2 | DIASTOLIC BLOOD PRESSURE: 70 MMHG | SYSTOLIC BLOOD PRESSURE: 110 MMHG | OXYGEN SATURATION: 98 % | WEIGHT: 134 LBS | HEART RATE: 105 BPM

## 2025-02-24 DIAGNOSIS — R79.89 ABNORMAL CBC: ICD-10-CM

## 2025-02-24 DIAGNOSIS — I27.24 CTEPH (CHRONIC THROMBOEMBOLIC PULMONARY HYPERTENSION): ICD-10-CM

## 2025-02-24 DIAGNOSIS — M25.512 ACUTE PAIN OF LEFT SHOULDER: ICD-10-CM

## 2025-02-24 DIAGNOSIS — L98.9 LESION OF NECK: ICD-10-CM

## 2025-02-24 DIAGNOSIS — M62.830 MUSCLE SPASM OF BACK: ICD-10-CM

## 2025-02-24 DIAGNOSIS — M13.0 ARTHRITIS OF MULTIPLE SITES: ICD-10-CM

## 2025-02-24 DIAGNOSIS — I26.99 MULTIPLE PULMONARY EMBOLI (MULTI): Primary | ICD-10-CM

## 2025-02-24 DIAGNOSIS — R04.2 HEMOPTYSIS: ICD-10-CM

## 2025-02-24 DIAGNOSIS — D47.3 ESSENTIAL (HEMORRHAGIC) THROMBOCYTHEMIA: ICD-10-CM

## 2025-02-24 PROCEDURE — 1036F TOBACCO NON-USER: CPT | Performed by: STUDENT IN AN ORGANIZED HEALTH CARE EDUCATION/TRAINING PROGRAM

## 2025-02-24 PROCEDURE — 99214 OFFICE O/P EST MOD 30 MIN: CPT | Performed by: STUDENT IN AN ORGANIZED HEALTH CARE EDUCATION/TRAINING PROGRAM

## 2025-02-24 PROCEDURE — 96372 THER/PROPH/DIAG INJ SC/IM: CPT | Performed by: STUDENT IN AN ORGANIZED HEALTH CARE EDUCATION/TRAINING PROGRAM

## 2025-02-24 RX ORDER — CYCLOBENZAPRINE HCL 10 MG
10 TABLET ORAL 3 TIMES DAILY
Qty: 30 TABLET | Refills: 0 | Status: SHIPPED | OUTPATIENT
Start: 2025-02-24

## 2025-02-24 NOTE — PROGRESS NOTES
Subjective   Patient ID: Elise Chávez is a 49 y.o. female who presents for Multiple Pulmonary Emboli.    HPI   Patient presenting for another 2-week follow-up due to her elevated platelet cell count as well as to receive her BESREMi injection    Continues to take the BESREMi as currently prescribed at 150 mcg  Her last CBC on 1/27/2025 did note improvement with a platelet cell count at 1036  This was one of the lowest on file in quite some time    She did have pending lab work that was ordered on 2/10/2025 but this was not completed    She is asking to get this done at today's visit    Also, it does appear that she is following up with a new hematology/oncology provider in April of this year to establish as her other provider is unable to continue following from [place]    Requesting to have the injection provided to her today as well as to have the lab work performed    Also noted that she continues to have some neck spasms including having a slight nodule on the left side of her neck  Wishes to discuss this further at this visit        Review of Systems  All pertinent positive symptoms are included in the history of present illness.    All other systems have been reviewed and are negative and noncontributory to this patient's current ailments.    Objective   /70 (BP Location: Left arm, Patient Position: Sitting, BP Cuff Size: Adult)   Pulse 105   Wt 60.8 kg (134 lb)   LMP  (LMP Unknown)   SpO2 98%   BMI 22.82 kg/m²     Physical Exam  CONSTITUTIONAL - well nourished, well developed, looks like stated age, in no acute distress, not ill-appearing, and not tired appearing  SKIN - normal skin color and pigmentation, normal skin turgor without rash, lesions, or nodules visualized  HEAD - no trauma, normocephalic  EYES - normal external exam  CHEST -no distressed breathing, good effort, tachycardic, regular rhythm  EXTREMITIES - no edema, no deformities  NEUROLOGICAL - normal balance, normal motor, no  ataxia  PSYCHIATRIC - alert, pleasant and cordial, age-appropriate  Neck-slight freely movable nodule on the left posterior chain ganglia of neck    Assessment/Plan   Lab work ordered to be done after this appointment and we will notify with the results and make recommendations accordingly    Please continue following up with all of your specialty providers as indicated the chart    We also provided a BESREMi injection as indicated in the chart  I understand that your goal would be increasing to 200 mcg every 2 weeks and please notify the office and I can gladly send in refills until you follow-up with your next hematologist    Lastly, we will follow-up closely and monitor your signs/symptoms within the next 1-2 weeks    Alongside this, I did order an ultrasound of your neck to further evaluate this nodule that was noted  We will notify of the results and make recommendations accordingly

## 2025-02-25 ENCOUNTER — PATIENT MESSAGE (OUTPATIENT)
Dept: PRIMARY CARE | Facility: CLINIC | Age: 50
End: 2025-02-25
Payer: COMMERCIAL

## 2025-02-25 LAB
ALBUMIN SERPL-MCNC: 4.1 G/DL (ref 3.6–5.1)
ALP SERPL-CCNC: 98 U/L (ref 31–125)
ALT SERPL-CCNC: 40 U/L (ref 6–29)
ANION GAP SERPL CALCULATED.4IONS-SCNC: 9 MMOL/L (CALC) (ref 7–17)
APTT PPP: 33 SEC (ref 23–32)
AST SERPL-CCNC: 35 U/L (ref 10–35)
BASOPHILS # BLD AUTO: 92 CELLS/UL (ref 0–200)
BASOPHILS NFR BLD AUTO: 0.7 %
BILIRUB SERPL-MCNC: 0.5 MG/DL (ref 0.2–1.2)
BUN SERPL-MCNC: 15 MG/DL (ref 7–25)
CALCIUM SERPL-MCNC: 9.1 MG/DL (ref 8.6–10.2)
CHLORIDE SERPL-SCNC: 104 MMOL/L (ref 98–110)
CO2 SERPL-SCNC: 26 MMOL/L (ref 20–32)
CREAT SERPL-MCNC: 0.71 MG/DL (ref 0.5–0.99)
EGFRCR SERPLBLD CKD-EPI 2021: 105 ML/MIN/1.73M2
EOSINOPHIL # BLD AUTO: 170 CELLS/UL (ref 15–500)
EOSINOPHIL NFR BLD AUTO: 1.3 %
ERYTHROCYTE [DISTWIDTH] IN BLOOD BY AUTOMATED COUNT: 16.7 % (ref 11–15)
GLUCOSE SERPL-MCNC: 86 MG/DL (ref 65–99)
HCT VFR BLD AUTO: 41.7 % (ref 35–45)
HGB BLD-MCNC: 12.3 G/DL (ref 11.7–15.5)
INR PPP: 1
LYMPHOCYTES # BLD AUTO: 4598 CELLS/UL (ref 850–3900)
LYMPHOCYTES NFR BLD AUTO: 35.1 %
MCH RBC QN AUTO: 23.4 PG (ref 27–33)
MCHC RBC AUTO-ENTMCNC: 29.5 G/DL (ref 32–36)
MCV RBC AUTO: 79.4 FL (ref 80–100)
MONOCYTES # BLD AUTO: 943 CELLS/UL (ref 200–950)
MONOCYTES NFR BLD AUTO: 7.2 %
NEUTROPHILS # BLD AUTO: 7297 CELLS/UL (ref 1500–7800)
NEUTROPHILS NFR BLD AUTO: 55.7 %
PLATELET # BLD AUTO: 820 THOUSAND/UL (ref 140–400)
PMV BLD REES-ECKER: 11 FL (ref 7.5–12.5)
POTASSIUM SERPL-SCNC: 4.8 MMOL/L (ref 3.5–5.3)
PROT SERPL-MCNC: 7.6 G/DL (ref 6.1–8.1)
PROTHROMBIN TIME: 11.1 SEC (ref 9–11.5)
RBC # BLD AUTO: 5.25 MILLION/UL (ref 3.8–5.1)
SODIUM SERPL-SCNC: 139 MMOL/L (ref 135–146)
WBC # BLD AUTO: 13.1 THOUSAND/UL (ref 3.8–10.8)

## 2025-03-10 ENCOUNTER — APPOINTMENT (OUTPATIENT)
Dept: PRIMARY CARE | Facility: CLINIC | Age: 50
End: 2025-03-10
Payer: COMMERCIAL

## 2025-03-11 ENCOUNTER — OFFICE VISIT (OUTPATIENT)
Dept: PRIMARY CARE | Facility: CLINIC | Age: 50
End: 2025-03-11
Payer: COMMERCIAL

## 2025-03-11 DIAGNOSIS — R79.89 ABNORMAL CBC: ICD-10-CM

## 2025-03-11 DIAGNOSIS — I27.24 CTEPH (CHRONIC THROMBOEMBOLIC PULMONARY HYPERTENSION): ICD-10-CM

## 2025-03-11 DIAGNOSIS — D47.3 ESSENTIAL (HEMORRHAGIC) THROMBOCYTHEMIA: ICD-10-CM

## 2025-03-11 DIAGNOSIS — I26.99 MULTIPLE PULMONARY EMBOLI (MULTI): Primary | ICD-10-CM

## 2025-03-11 PROCEDURE — 96372 THER/PROPH/DIAG INJ SC/IM: CPT | Performed by: STUDENT IN AN ORGANIZED HEALTH CARE EDUCATION/TRAINING PROGRAM

## 2025-03-11 NOTE — PROGRESS NOTES
Besremi Injection provided by the patient was administered in the office today without complication by Cesilia Andrade MA

## 2025-03-14 ENCOUNTER — HOSPITAL ENCOUNTER (OUTPATIENT)
Dept: RADIOLOGY | Facility: CLINIC | Age: 50
Discharge: HOME | End: 2025-03-14
Payer: COMMERCIAL

## 2025-03-14 DIAGNOSIS — L98.9 LESION OF NECK: ICD-10-CM

## 2025-03-14 PROCEDURE — 76536 US EXAM OF HEAD AND NECK: CPT

## 2025-03-16 NOTE — RESULT ENCOUNTER NOTE
Ultrasound of the neck shows a normal lymph node that is nonconcerning    I would recommend continuing to monitor and if this continues to be an issue, we can easily discuss having her follow-up with head and neck specialty provider to discuss this further

## 2025-03-24 ENCOUNTER — APPOINTMENT (OUTPATIENT)
Dept: PRIMARY CARE | Facility: CLINIC | Age: 50
End: 2025-03-24
Payer: COMMERCIAL

## 2025-03-24 VITALS
DIASTOLIC BLOOD PRESSURE: 70 MMHG | OXYGEN SATURATION: 96 % | SYSTOLIC BLOOD PRESSURE: 104 MMHG | WEIGHT: 137 LBS | BODY MASS INDEX: 23.33 KG/M2 | HEART RATE: 145 BPM

## 2025-03-24 DIAGNOSIS — D47.3 ESSENTIAL (HEMORRHAGIC) THROMBOCYTHEMIA: ICD-10-CM

## 2025-03-24 DIAGNOSIS — I26.99 MULTIPLE PULMONARY EMBOLI (MULTI): Primary | ICD-10-CM

## 2025-03-24 DIAGNOSIS — R04.2 HEMOPTYSIS: ICD-10-CM

## 2025-03-24 DIAGNOSIS — I27.24 CTEPH (CHRONIC THROMBOEMBOLIC PULMONARY HYPERTENSION): ICD-10-CM

## 2025-03-24 DIAGNOSIS — R60.0 EDEMA OF RIGHT LOWER LEG: ICD-10-CM

## 2025-03-24 DIAGNOSIS — R79.89 ABNORMAL CBC: ICD-10-CM

## 2025-03-24 DIAGNOSIS — J34.2 NASAL SEPTAL EROSION: ICD-10-CM

## 2025-03-24 PROCEDURE — 96372 THER/PROPH/DIAG INJ SC/IM: CPT | Performed by: STUDENT IN AN ORGANIZED HEALTH CARE EDUCATION/TRAINING PROGRAM

## 2025-03-24 PROCEDURE — 99215 OFFICE O/P EST HI 40 MIN: CPT | Performed by: STUDENT IN AN ORGANIZED HEALTH CARE EDUCATION/TRAINING PROGRAM

## 2025-03-24 PROCEDURE — 1036F TOBACCO NON-USER: CPT | Performed by: STUDENT IN AN ORGANIZED HEALTH CARE EDUCATION/TRAINING PROGRAM

## 2025-03-24 NOTE — PROGRESS NOTES
Subjective   Patient ID: Elise Chávez is a 49 y.o. female who presents for Multiple Pulmonary Emboli.  Today she is accompanied by alone.     HPI  Essential (hemorrhagic) thrombocythemia, Abnormal CBC, Hemoptysis, & H/o multiple pulmonary emboli  Patient presents for another 2-week follow-up for her BESREMi injection, which she receives for essential thrombocythemia.   Improvement on most recent CBC (2/24/2025) with downtrend in plt form 1036 to 820.   Scheduled to establish care with Hem/Onc (4/22/2025) as her previous provider is unable to continue following from [place].  Very hopeful that she continues to see progress but wishes to discuss this further at today's visit    2. Edema of Right lower extremity  Today, reports new swelling of the RLE not present at prior visit.  She did send a message and was recommended to go to the emergency room but unfortunately did not follow those instructions  Wishes to have this evaluated today via an ultrasound of possible    3. Neck nodule & Neck spasms/septum perforation  Reviewed Neck US (3/14/2025) showing a normal appearing lymph node in L posterior neck.   At last visit, pt also reported intermittent neck spasms and slight nodule on L side of neck.  Also, she report nasal congestion and concerned mouth breathing especially at night could cause hypoxia.  On further questioning continues to notice some postnasal drainage and even some blood production  Wishing to have this further evaluated    Current Outpatient Medications on File Prior to Visit   Medication Sig Dispense Refill    cyclobenzaprine (Flexeril) 10 mg tablet Take 1 tablet (10 mg) by mouth as needed at bedtime for muscle spasms.      cyclobenzaprine (Flexeril) 10 mg tablet Take 1 tablet (10 mg) by mouth 3 times a day. 30 tablet 0    enoxaparin (Lovenox) 60 mg/0.6 mL syringe Inject 57.5 mg under the skin every 12 hours.      ropeginterferon [id number] (Besremi) subcutaneous syringe Inject 0.2 mL (100 mcg) under  the skin every 14 (fourteen) days.       No current facility-administered medications on file prior to visit.        No Known Allergies      There is no immunization history on file for this patient.      Review of Systems  All pertinent positive symptoms are included in the history of present illness.  All other systems have been reviewed and are negative and noncontributory to this patient's current ailments.     Objective   /70 (BP Location: Left arm, Patient Position: Sitting, BP Cuff Size: Adult)   Pulse (!) 145   Wt 62.1 kg (137 lb)   LMP  (LMP Unknown)   SpO2 96%   BMI 23.33 kg/m²   BSA: 1.68 meters squared  No visits with results within 1 Month(s) from this visit.   Latest known visit with results is:   Office Visit on 02/10/2025   Component Date Value Ref Range Status    GLUCOSE 02/24/2025 86  65 - 99 mg/dL Final    Comment:               Fasting reference interval         UREA NITROGEN (BUN) 02/24/2025 15  7 - 25 mg/dL Final    CREATININE 02/24/2025 0.71  0.50 - 0.99 mg/dL Final    EGFR 02/24/2025 105  > OR = 60 mL/min/1.73m2 Final    SODIUM 02/24/2025 139  135 - 146 mmol/L Final    POTASSIUM 02/24/2025 4.8  3.5 - 5.3 mmol/L Final    CHLORIDE 02/24/2025 104  98 - 110 mmol/L Final    CARBON DIOXIDE 02/24/2025 26  20 - 32 mmol/L Final    ELECTROLYTE BALANCE 02/24/2025 9  7 - 17 mmol/L (calc) Final    CALCIUM 02/24/2025 9.1  8.6 - 10.2 mg/dL Final    PROTEIN, TOTAL 02/24/2025 7.6  6.1 - 8.1 g/dL Final    ALBUMIN 02/24/2025 4.1  3.6 - 5.1 g/dL Final    BILIRUBIN, TOTAL 02/24/2025 0.5  0.2 - 1.2 mg/dL Final    ALKALINE PHOSPHATASE 02/24/2025 98  31 - 125 U/L Final    AST 02/24/2025 35  10 - 35 U/L Final    ALT 02/24/2025 40 (H)  6 - 29 U/L Final    WHITE BLOOD CELL COUNT 02/24/2025 13.1 (H)  3.8 - 10.8 Thousand/uL Final    RED BLOOD CELL COUNT 02/24/2025 5.25 (H)  3.80 - 5.10 Million/uL Final    HEMOGLOBIN 02/24/2025 12.3  11.7 - 15.5 g/dL Final    HEMATOCRIT 02/24/2025 41.7  35.0 - 45.0 % Final     MCV 02/24/2025 79.4 (L)  80.0 - 100.0 fL Final    MCH 02/24/2025 23.4 (L)  27.0 - 33.0 pg Final    MCHC 02/24/2025 29.5 (L)  32.0 - 36.0 g/dL Final    Comment: For adults, a slight decrease in the calculated MCHC  value (in the range of 30 to 32 g/dL) is most likely  not clinically significant; however, it should be  interpreted with caution in correlation with other  red cell parameters and the patient's clinical  condition.      RDW 02/24/2025 16.7 (H)  11.0 - 15.0 % Final    PLATELET COUNT 02/24/2025 820 (H)  140 - 400 Thousand/uL Final    MPV 02/24/2025 11.0  7.5 - 12.5 fL Final    ABSOLUTE NEUTROPHILS 02/24/2025 7,297  1,500 - 7,800 cells/uL Final    ABSOLUTE LYMPHOCYTES 02/24/2025 4,598 (H)  850 - 3,900 cells/uL Final    ABSOLUTE MONOCYTES 02/24/2025 943  200 - 950 cells/uL Final    ABSOLUTE EOSINOPHILS 02/24/2025 170  15 - 500 cells/uL Final    ABSOLUTE BASOPHILS 02/24/2025 92  0 - 200 cells/uL Final    NEUTROPHILS 02/24/2025 55.7  % Final    LYMPHOCYTES 02/24/2025 35.1  % Final    MONOCYTES 02/24/2025 7.2  % Final    EOSINOPHILS 02/24/2025 1.3  % Final    BASOPHILS 02/24/2025 0.7  % Final    INR 02/24/2025 1.0   Final    Comment: Reference Range                     0.9-1.1  Moderate-intensity Warfarin Therapy 2.0-3.0  Higher-intensity Warfarin Therapy   3.0-4.0          PT 02/24/2025 11.1  9.0 - 11.5 sec Final    Comment: For additional information, please refer to  http://education.[employer]/faq/DMN969  (This link is being provided for informational/  educational purposes only.)      PARTIAL THROMBOPLASTIN TIME, ACTIV* 02/24/2025 33 (H)  23 - 32 sec Final    Comment:    This test has not been validated for monitoring  unfractionated heparin therapy. For testing that  is validated for this type of therapy, please refer  to the Heparin Anti-Xa assay (test code 42538).     For additional information, please refer to  http://education.[employer]/faq/LGZ955  (This link is being provided for    informational/educational purposes only.)         Physical Exam  CONSTITUTIONAL - well nourished, well developed, looks like stated age, in no acute distress, not ill-appearing, and not tired appearing  SKIN - normal skin color and pigmentation, normal skin turgor without rash, lesions, or nodules visualized  HEAD - no trauma, normocephalic  EYES - normal external exam  CHEST -no distressed breathing, good effort, heart tachycardic and lungs good auscultation bilaterally  ENT -noted perforated nasal septum with no blood  EXTREMITIES -pitting edema noted approximately 2+ on the right lower extremity worse than the left  NEUROLOGICAL - normal balance, normal motor, no ataxia  PSYCHIATRIC - alert, pleasant and cordial, age-appropriate    Assessment/Plan       Essential (hemorrhagic) thrombocythemia, Abnormal CBC, Hemoptysis, & H/o multiple pulmonary emboli  Improved thrombocythemia on recent lab work (2/25/2025); downtrend in plt (1036 to 820).  Provided a BESREMi injection as indicated in the chart  I understand that your goal would be increasing to 200 mcg every 2 weeks and please notify the office and I can gladly send in refills until you follow-up with your next hematologist (care establishment scheduled 4/22/2025).  Injection was provided today at this higher dosage and we will continue monitoring closely  Lab work placed in the chart and we will follow-up appropriately    2. Edema of Right lower extremity  Initial encounter with pt presenting with RLE edema.   Given h/o multiple PE and ongoing though improved thrombocythemia, ordered Duplex Vascular US of RLE for DVT rule-out.  I did recommend discussing following up with the hospital but I know you declined at this time  So please get this study done at your earliest mutual convenience    3. Neck nodule & Neck spasms/perforated nasal septum  Reviewed Neck US (3/14/2025) showing a normal appearing lymph node in L posterior neck.   Advised gently stretching to  aid muscle spasms and heating pad as needed.  I also recommend that you follow-up with ENT at your earliest mutual time

## 2025-03-25 DIAGNOSIS — R74.8 ELEVATED LIVER ENZYMES: ICD-10-CM

## 2025-03-25 DIAGNOSIS — E87.5 HYPERKALEMIA: Primary | ICD-10-CM

## 2025-03-25 LAB
ALBUMIN SERPL-MCNC: 3.9 G/DL (ref 3.6–5.1)
ALP SERPL-CCNC: 93 U/L (ref 31–125)
ALT SERPL-CCNC: 46 U/L (ref 6–29)
ANION GAP SERPL CALCULATED.4IONS-SCNC: 16 MMOL/L (CALC) (ref 7–17)
APTT PPP: 31 SEC (ref 23–32)
AST SERPL-CCNC: 41 U/L (ref 10–35)
BASOPHILS # BLD AUTO: 83 CELLS/UL (ref 0–200)
BASOPHILS NFR BLD AUTO: 0.6 %
BILIRUB SERPL-MCNC: 0.6 MG/DL (ref 0.2–1.2)
BUN SERPL-MCNC: 13 MG/DL (ref 7–25)
CALCIUM SERPL-MCNC: 8.9 MG/DL (ref 8.6–10.2)
CHLORIDE SERPL-SCNC: 104 MMOL/L (ref 98–110)
CO2 SERPL-SCNC: 21 MMOL/L (ref 20–32)
CREAT SERPL-MCNC: 0.8 MG/DL (ref 0.5–0.99)
EGFRCR SERPLBLD CKD-EPI 2021: 91 ML/MIN/1.73M2
EOSINOPHIL # BLD AUTO: 14 CELLS/UL (ref 15–500)
EOSINOPHIL NFR BLD AUTO: 0.1 %
ERYTHROCYTE [DISTWIDTH] IN BLOOD BY AUTOMATED COUNT: 20 % (ref 11–15)
GLUCOSE SERPL-MCNC: 53 MG/DL (ref 65–99)
HCT VFR BLD AUTO: 39.4 % (ref 35–45)
HGB BLD-MCNC: 11.7 G/DL (ref 11.7–15.5)
INR PPP: 1
LYMPHOCYTES # BLD AUTO: 4114 CELLS/UL (ref 850–3900)
LYMPHOCYTES NFR BLD AUTO: 29.6 %
MCH RBC QN AUTO: 23.1 PG (ref 27–33)
MCHC RBC AUTO-ENTMCNC: 29.7 G/DL (ref 32–36)
MCV RBC AUTO: 77.7 FL (ref 80–100)
MONOCYTES # BLD AUTO: 1098 CELLS/UL (ref 200–950)
MONOCYTES NFR BLD AUTO: 7.9 %
NEUTROPHILS # BLD AUTO: 8590 CELLS/UL (ref 1500–7800)
NEUTROPHILS NFR BLD AUTO: 61.8 %
PLATELET # BLD AUTO: 797 THOUSAND/UL (ref 140–400)
PMV BLD REES-ECKER: 11.8 FL (ref 7.5–12.5)
POTASSIUM SERPL-SCNC: 5.7 MMOL/L (ref 3.5–5.3)
PROT SERPL-MCNC: 7.6 G/DL (ref 6.1–8.1)
PROTHROMBIN TIME: 10.9 SEC (ref 9–11.5)
RBC # BLD AUTO: 5.07 MILLION/UL (ref 3.8–5.1)
SODIUM SERPL-SCNC: 141 MMOL/L (ref 135–146)
WBC # BLD AUTO: 13.9 THOUSAND/UL (ref 3.8–10.8)

## 2025-03-25 NOTE — RESULT ENCOUNTER NOTE
Complete blood cell count overall is stable and does show a continued decrease of the platelet cell count now at 797 with the previous at 820  This continues to trend in the correct direction    Potassium is elevated 5.7 as well as liver function continues to be elevated at 41 and 46 respectively    I would recommend she repeats the CMP within the next 1-2 days so we can monitor closely    PT/INR and PTT are all well within normal limits

## 2025-03-27 ENCOUNTER — HOSPITAL ENCOUNTER (OUTPATIENT)
Dept: RADIOLOGY | Facility: CLINIC | Age: 50
Discharge: HOME | End: 2025-03-27
Payer: COMMERCIAL

## 2025-03-27 DIAGNOSIS — R60.0 EDEMA OF RIGHT LOWER LEG: ICD-10-CM

## 2025-03-27 PROBLEM — Z15.89 JAK2 GENE MUTATION: Status: ACTIVE | Noted: 2024-11-27

## 2025-03-27 PROBLEM — D69.3: Status: ACTIVE | Noted: 2024-11-27

## 2025-03-27 PROBLEM — R04.2 HEMOPTYSIS: Status: RESOLVED | Noted: 2023-05-23 | Resolved: 2025-03-27

## 2025-03-27 PROCEDURE — 93971 EXTREMITY STUDY: CPT

## 2025-03-27 PROCEDURE — 93971 EXTREMITY STUDY: CPT | Performed by: RADIOLOGY

## 2025-03-27 NOTE — RESULT ENCOUNTER NOTE
Ultrasound shows no evidence for any DVTs    We will need to continue monitoring this swelling and I would recommend compression stockings as well as keeping her legs elevated when possible

## 2025-03-31 DIAGNOSIS — I27.24 CTEPH (CHRONIC THROMBOEMBOLIC PULMONARY HYPERTENSION): ICD-10-CM

## 2025-03-31 DIAGNOSIS — D47.3 ESSENTIAL (HEMORRHAGIC) THROMBOCYTHEMIA: ICD-10-CM

## 2025-03-31 DIAGNOSIS — I26.99 MULTIPLE PULMONARY EMBOLI (MULTI): Primary | ICD-10-CM

## 2025-03-31 DIAGNOSIS — R79.89 ABNORMAL CBC: ICD-10-CM

## 2025-03-31 RX ORDER — ROPEGINTERFERON ALFA-2B 500 UG/ML
100 INJECTION SUBCUTANEOUS
Qty: 1 ML | Refills: 0 | Status: SHIPPED | OUTPATIENT
Start: 2025-03-31 | End: 2025-04-04 | Stop reason: SDUPTHER

## 2025-04-02 ENCOUNTER — APPOINTMENT (OUTPATIENT)
Dept: OTOLARYNGOLOGY | Facility: CLINIC | Age: 50
End: 2025-04-02
Payer: COMMERCIAL

## 2025-04-02 VITALS — WEIGHT: 133 LBS | BODY MASS INDEX: 22.65 KG/M2

## 2025-04-02 DIAGNOSIS — J31.0 CHRONIC RHINITIS: ICD-10-CM

## 2025-04-02 DIAGNOSIS — J34.2 NASAL SEPTAL EROSION: ICD-10-CM

## 2025-04-02 DIAGNOSIS — R04.0 RECURRENT EPISTAXIS: ICD-10-CM

## 2025-04-02 DIAGNOSIS — J34.89 NASAL SEPTAL PERFORATION: Primary | ICD-10-CM

## 2025-04-02 PROCEDURE — 99204 OFFICE O/P NEW MOD 45 MIN: CPT | Performed by: GENERAL PRACTICE

## 2025-04-02 PROCEDURE — 1036F TOBACCO NON-USER: CPT | Performed by: GENERAL PRACTICE

## 2025-04-02 PROCEDURE — G8433 SCR FOR DEP NOT CPT DOC RSN: HCPCS | Performed by: GENERAL PRACTICE

## 2025-04-02 RX ORDER — MUPIROCIN 20 MG/G
OINTMENT TOPICAL 2 TIMES DAILY
Qty: 22 G | Refills: 0 | Status: SHIPPED | OUTPATIENT
Start: 2025-04-02 | End: 2025-04-16

## 2025-04-02 NOTE — PROGRESS NOTES
Otolaryngology - Head and Neck Surgery Outpatient New Patient Visit Note        Assessment/Plan:   Problem List Items Addressed This Visit    None  Visit Diagnoses         Codes    Nasal septal perforation    -  Primary J34.89    Nasal septal erosion     J34.2    Chronic rhinitis     J31.0    Relevant Medications    mupirocin (Bactroban) 2 % ointment    Recurrent epistaxis     R04.0            49yoF with anterior septal perforation associated with recent epistaxis and digital trauma.   Discolored/mucoid debris associated, will treat with mupirocin BID for 2 weeks, then saline gel.  Nasacort for rhinitis (Discussed avoidance of septum)  Discussed acute care for epistaxis.  Discussed possible future septal button or septal reconstruction.            Follow up:  -plan for follow up in clinic as needed and in 1-2 months    All of the above findings, impressions, treatment planning and follow up plans were discussed with the patient who indicated understanding.  the patient was instructed to contact or return to clinic sooner if symptoms/signs persist or worsen despite the above management.      Donny Wiggins MD  Otolaryngology - Head and Neck Surgery            History Of Present Illness  Elise Chávez is a 49 y.o. female presenting for evaluation of septal perforation    Reports recent increase in epistaxis associated with lovenox management for history of PE.   Reports intermittent epistaxis, and crusted/clotted debris in nose.   Reports nasal obstruction due to nasal debris and picking at debris.  Noted to have septal perofration at recent PCM exam.    Denies priro nasal trauma/surgery  Reports chronic nasal obstruction and whistling associated with perforation  Reports intermittent blood and mucoid debris in nose.   No prior intranasal drug use    Reports no significant allergic rhinitis or recurrent sinusitis.                  Past Medical History  She has a past medical history of Chronic pancolonic ulcerative colitis  (Multi) (11/04/2024), Encounter for follow-up examination after completed treatment for conditions other than malignant neoplasm (10/25/2021), Encounter for surveillance of contraceptives, unspecified (08/11/2020), Hemoptysis (05/23/2023), Melena, Other conditions influencing health status, Personal history of other endocrine, nutritional and metabolic disease, Personal history of other specified conditions (12/02/2013), Personal history of other specified conditions (10/16/2020), Personal history of other specified conditions, and Personal history of urinary calculi (12/02/2013).    Surgical History  She has a past surgical history that includes Refractive surgery (06/11/2013); Other surgical history (06/11/2013); and Lithotripsy (12/10/2013).     Social History  She reports that she has never smoked. She has never used smokeless tobacco. No history on file for alcohol use and drug use.    Family History  No family history on file.     Allergies  Patient has no known allergies.    Review of Systems  ROS: Pertinent positives as noted in HPI.    - CONSTITUTIONAL: Does not report weight loss, fever or chills.    - HEENT:   Ear: Does not report tinnitus, vertigo, hearing loss, otalgia, otorrhea  Nose: Does not report  ,  ,  , decreased smell  Throat: Does not report pain, dysphagia, odynophagia  Larynx: Does not report hoarseness,  difficulty breathing, pain with speaking (odynophonia)  Neck: Does not report new masses, pain, swelling  Face: Does not report sinus pain, pressure, swelling, numbness, weakness     - RESPIRATORY: Does not report SOB or cough.    - CV: Does not report palpitations or chest pain.     - GI: Does not report abdominal pain, nausea, vomiting or diarrhea.    - : Does not report dysuria or urinary frequency.    - MSK: Does not report myalgia or joint pain.    - SKIN: Does not report rash or pruritus.    - NEUROLOGICAL: Does not report headache or syncope.    - PSYCHIATRIC: Does not report recent  changes in mood. Does not report anxiety or depression.         Physical Exam:     GENERAL:   Alert & Oriented to person, place and time; Normal affect and appearance. Well developed and well nourished. Conversant & cooperative with examination.     HEAD:   Normocephalic, atraumatic. No sinus tenderness to palpation. Normal parotid bilaterally. Normal facial strength.     NEUROLOGIC:   Cranial nerves II-XII grossly intact, gait WNL. Normal mood and affect.    EYES:   Extraocular movements intact. Pupils equal, round, reactive to light and accommodation. No nystagmus, no ptosis. no scleral injection.    EAR:   Normal auricle. No discomfort or TTP with manipulation.   Handheld otoscopic exam showed normal external auditory canals bilaterally. No purulence or EAC inflammation. Minimal cerumen.   Right tympanic membrane clear and mobile without evidence of perforation, retraction or middle ear effusion.   Left tympanic membrane clear and mobile without evidence of perforation, retraction or middle ear effusion.     NOSE:   No external deformity. No external nasal lesions, lacerations, or scars. Nasal tip symmetrical with normal nasal valves.   Nasal cavity with  1cm anterior/inferior perforation of overall midline   septum, perforation with mucopurulent debris and small clot.  No obvious mass or ulcerative lesion.  Otherwise edematous but normal mucosa and turbinates. No lesions, masses, purulence or polyps.     OC/OP:   Mucous membranes moist, no masses, lesions or exudates.   Normal tongue, floor of mouth, teeth, gums, lips. Normal posterior pharyngeal wall.    Normal tonsils without erythema, exudate or obvious calculi     NECK:   No neck masses or thyroid enlargement. Trachea midline. No tenderness to palpation    LYMPHATIC:   1.5cm left posterior neck lymph node which is soft, mobile, nonTTP.   No cervical lymphadenopathy.     RESPIRATORY:   Symmetric chest elevation & no retractions. No significant hoarseness. No  increased work of breathing.    CV:   No clubbing or cyanosis. No obvious edema    Skin:   No facial rashes, vesicles or lesions.     Extremities:   No gross abnormalities      Clinic Procedure        Information review:  External sources (notes, imaging, lab results) listed below personally reviewed to aid in medical decision making process.  - PCM 3/24/25  -Vasc note 1/29/25  -US neck 3/15/25

## 2025-04-04 DIAGNOSIS — R79.89 ABNORMAL CBC: ICD-10-CM

## 2025-04-04 DIAGNOSIS — D47.3 ESSENTIAL (HEMORRHAGIC) THROMBOCYTHEMIA: ICD-10-CM

## 2025-04-04 DIAGNOSIS — I27.24 CTEPH (CHRONIC THROMBOEMBOLIC PULMONARY HYPERTENSION): ICD-10-CM

## 2025-04-04 DIAGNOSIS — I26.99 MULTIPLE PULMONARY EMBOLI (MULTI): ICD-10-CM

## 2025-04-04 RX ORDER — ROPEGINTERFERON ALFA-2B 500 UG/ML
200 INJECTION SUBCUTANEOUS
Qty: 1 ML | Refills: 0 | Status: SHIPPED | OUTPATIENT
Start: 2025-04-04

## 2025-04-07 ENCOUNTER — APPOINTMENT (OUTPATIENT)
Dept: PRIMARY CARE | Facility: CLINIC | Age: 50
End: 2025-04-07
Payer: COMMERCIAL

## 2025-04-07 VITALS — HEART RATE: 141 BPM | OXYGEN SATURATION: 98 % | DIASTOLIC BLOOD PRESSURE: 70 MMHG | SYSTOLIC BLOOD PRESSURE: 112 MMHG

## 2025-04-07 DIAGNOSIS — R60.0 EDEMA OF RIGHT LOWER LEG: ICD-10-CM

## 2025-04-07 DIAGNOSIS — R04.2 HEMOPTYSIS: ICD-10-CM

## 2025-04-07 DIAGNOSIS — I27.24 CTEPH (CHRONIC THROMBOEMBOLIC PULMONARY HYPERTENSION): ICD-10-CM

## 2025-04-07 DIAGNOSIS — I26.99 MULTIPLE PULMONARY EMBOLI (MULTI): Primary | ICD-10-CM

## 2025-04-07 DIAGNOSIS — R79.89 ABNORMAL CBC: ICD-10-CM

## 2025-04-07 DIAGNOSIS — D47.3 ESSENTIAL (HEMORRHAGIC) THROMBOCYTHEMIA: ICD-10-CM

## 2025-04-07 DIAGNOSIS — R14.0 ABDOMINAL BLOATING: ICD-10-CM

## 2025-04-07 PROCEDURE — 99215 OFFICE O/P EST HI 40 MIN: CPT | Performed by: STUDENT IN AN ORGANIZED HEALTH CARE EDUCATION/TRAINING PROGRAM

## 2025-04-07 PROCEDURE — 1036F TOBACCO NON-USER: CPT | Performed by: STUDENT IN AN ORGANIZED HEALTH CARE EDUCATION/TRAINING PROGRAM

## 2025-04-07 NOTE — PROGRESS NOTES
Subjective   Patient ID: Elise Chávez is a 49 y.o. female who presents for Follow-up.    HPI   Patient is following up to discuss her current signs/symptoms of her recurrent blood clots, abnormal lab work, and other issues on the chart below    Patient continues to note shortness of breath with activity, right ankle/leg swelling, increased heart rate, dry cough, abdominal swelling, decreased appetite including nausea, and even noting a colitis flareup    Unfortunately she feels that she is not fully getting better but continues to feel that the Besremi is helping her signs/symptoms due to her platelet cell count slowly decreasing    At her last visit a septal erosion as well as leg swelling was noted to be new    She did follow-up with an otolaryngologist as well as have some imaging performed by myself which showed a negative ultrasound for any DVT/clots    Due to her current symptoms, she is very hesitant to go to the emergency room as she does not wish to get admitted once again but wishes to discuss this further with myself today    Mentioning that she believes further testing is necessary but again hesitant on getting any of these done    Wishes to discuss this further at today's visit and asking for advice/recommendations    Review of Systems  All pertinent positive symptoms are included in the history of present illness.    All other systems have been reviewed and are negative and noncontributory to this patient's current ailments.    Objective   /70 (BP Location: Left arm, Patient Position: Sitting, BP Cuff Size: Adult)   Pulse (!) 141   LMP  (LMP Unknown)   SpO2 98%     Physical Exam  CONSTITUTIONAL - well nourished, well developed, looks like stated age, in no acute distress, not ill-appearing, and not tired appearing  SKIN - normal skin color and pigmentation, normal skin turgor without rash, lesions, or nodules visualized  HEAD - no trauma, normocephalic  EYES - normal external exam  CHEST -no  distressed breathing, good effort  Abdomen =-noted tenderness in the right upper quadrant, fullness  EXTREMITIES -right leg edema, 2+ pitting  NEUROLOGICAL - normal balance, normal motor, no ataxia  PSYCHIATRIC - alert, pleasant and cordial, age-appropriate    Assessment/Plan   We had a long conversation about all of your signs/symptoms and I truly recommend once again that you should go to the emergency room to be acutely evaluated    I understand that you are frustrated and he did not wish to go to the emergency room but I truly believe that this would be the best option    I do not like how your signs/symptoms are worsening as well as continuing to have a tachycardic heart rate as well as this new abdominal bloating, worsening colitis, and even right lower extremity edema/swelling    Per your request, I did agree to order lab work as well as stat imaging of your abdomen/pelvis as well as your chest to further evaluate for any further clots    I recommend you get this done ASAP but again I would recommend that you need to go to the emergency room to get these performed    At any point if you notice worsening or acute signs/symptoms I would recommend you call 911/EMS to be further evaluated    If you do end up getting the CT scans, I will follow-up accordingly and make recommendations per the results      Time Spent  Prep time on day of patient encounter: 5 minutes  Time spent directly with patient, family or caregiver: 40 minutes  Additional Time Spent on Patient Care Activities: 0 minutes  Documentation Time: 5 minutes  Other Time Spent: 0 minutes  Total: 50 minutes

## 2025-04-08 ENCOUNTER — PATIENT MESSAGE (OUTPATIENT)
Dept: PRIMARY CARE | Facility: CLINIC | Age: 50
End: 2025-04-08
Payer: COMMERCIAL

## 2025-04-08 DIAGNOSIS — I26.99 MULTIPLE PULMONARY EMBOLI (MULTI): ICD-10-CM

## 2025-04-08 DIAGNOSIS — R79.89 ABNORMAL CBC: ICD-10-CM

## 2025-04-08 DIAGNOSIS — I27.24 CTEPH (CHRONIC THROMBOEMBOLIC PULMONARY HYPERTENSION): ICD-10-CM

## 2025-04-08 DIAGNOSIS — D47.3 ESSENTIAL (HEMORRHAGIC) THROMBOCYTHEMIA: ICD-10-CM

## 2025-04-08 RX ORDER — ROPEGINTERFERON ALFA-2B 500 UG/ML
200 INJECTION SUBCUTANEOUS
Qty: 1 ML | Refills: 1 | Status: SHIPPED | OUTPATIENT
Start: 2025-04-08

## 2025-04-10 ENCOUNTER — OFFICE VISIT (OUTPATIENT)
Dept: PRIMARY CARE | Facility: CLINIC | Age: 50
End: 2025-04-10
Payer: COMMERCIAL

## 2025-04-10 DIAGNOSIS — I27.24 CTEPH (CHRONIC THROMBOEMBOLIC PULMONARY HYPERTENSION): ICD-10-CM

## 2025-04-10 DIAGNOSIS — I26.99 MULTIPLE PULMONARY EMBOLI (MULTI): Primary | ICD-10-CM

## 2025-04-10 DIAGNOSIS — D47.3 ESSENTIAL (HEMORRHAGIC) THROMBOCYTHEMIA: ICD-10-CM

## 2025-04-10 DIAGNOSIS — R79.89 ABNORMAL CBC: ICD-10-CM

## 2025-04-10 PROCEDURE — 96372 THER/PROPH/DIAG INJ SC/IM: CPT | Performed by: STUDENT IN AN ORGANIZED HEALTH CARE EDUCATION/TRAINING PROGRAM

## 2025-04-10 NOTE — PROGRESS NOTES
Besremi Injection provided by the patient was administered in the office today without complication by Ceislia Andrade MA

## 2025-04-11 LAB
ALBUMIN SERPL-MCNC: 3.7 G/DL (ref 3.6–5.1)
ALP SERPL-CCNC: 86 U/L (ref 31–125)
ALT SERPL-CCNC: 49 U/L (ref 6–29)
ANION GAP SERPL CALCULATED.4IONS-SCNC: 12 MMOL/L (CALC) (ref 7–17)
APTT PPP: 32 SEC (ref 23–32)
AST SERPL-CCNC: 52 U/L (ref 10–35)
BASOPHILS # BLD AUTO: 86 CELLS/UL (ref 0–200)
BASOPHILS NFR BLD AUTO: 0.7 %
BILIRUB SERPL-MCNC: 1 MG/DL (ref 0.2–1.2)
BUN SERPL-MCNC: 12 MG/DL (ref 7–25)
CALCIUM SERPL-MCNC: 8.7 MG/DL (ref 8.6–10.2)
CHLORIDE SERPL-SCNC: 100 MMOL/L (ref 98–110)
CO2 SERPL-SCNC: 25 MMOL/L (ref 20–32)
CREAT SERPL-MCNC: 0.88 MG/DL (ref 0.5–0.99)
EGFRCR SERPLBLD CKD-EPI 2021: 82 ML/MIN/1.73M2
EOSINOPHIL # BLD AUTO: 49 CELLS/UL (ref 15–500)
EOSINOPHIL NFR BLD AUTO: 0.4 %
ERYTHROCYTE [DISTWIDTH] IN BLOOD BY AUTOMATED COUNT: 22.5 % (ref 11–15)
GLUCOSE SERPL-MCNC: 74 MG/DL (ref 65–99)
HCT VFR BLD AUTO: 38.7 % (ref 35–45)
HGB BLD-MCNC: 11.5 G/DL (ref 11.7–15.5)
INR PPP: 1
LYMPHOCYTES # BLD AUTO: 4613 CELLS/UL (ref 850–3900)
LYMPHOCYTES NFR BLD AUTO: 37.5 %
MCH RBC QN AUTO: 23.6 PG (ref 27–33)
MCHC RBC AUTO-ENTMCNC: 29.7 G/DL (ref 32–36)
MCV RBC AUTO: 79.3 FL (ref 80–100)
MONOCYTES # BLD AUTO: 984 CELLS/UL (ref 200–950)
MONOCYTES NFR BLD AUTO: 8 %
MORPHOLOGY BLD-IMP: ABNORMAL
NEUTROPHILS # BLD AUTO: 6568 CELLS/UL (ref 1500–7800)
NEUTROPHILS NFR BLD AUTO: 53.4 %
PLATELET # BLD AUTO: 736 THOUSAND/UL (ref 140–400)
PMV BLD REES-ECKER: 11.8 FL (ref 7.5–12.5)
POTASSIUM SERPL-SCNC: 4.8 MMOL/L (ref 3.5–5.3)
PROT SERPL-MCNC: 7.2 G/DL (ref 6.1–8.1)
PROTHROMBIN TIME: 11 SEC (ref 9–11.5)
RBC # BLD AUTO: 4.88 MILLION/UL (ref 3.8–5.1)
SODIUM SERPL-SCNC: 137 MMOL/L (ref 135–146)
WBC # BLD AUTO: 12.3 THOUSAND/UL (ref 3.8–10.8)

## 2025-04-11 NOTE — RESULT ENCOUNTER NOTE
Complete blood cell count shows a continued decrease in her platelet cell count at 736  White blood cell count is slowly trending down as well    Lastly, she is now slightly anemic with hemoglobin on the lower end of normal as well    Kidney function and electrolytes within normal limits    Liver enzymes continue to slightly increased with now AST at 52 and ALT of 49  We will repeat lab work again in 2 weeks and discuss this further    Bleeding time within normal limits

## 2025-04-15 LAB
ALBUMIN SERPL-MCNC: 3.7 G/DL (ref 3.6–5.1)
ALP SERPL-CCNC: 86 U/L (ref 31–125)
ALT SERPL-CCNC: 49 U/L (ref 6–29)
ANION GAP SERPL CALCULATED.4IONS-SCNC: 12 MMOL/L (CALC) (ref 7–17)
APTT PPP: 32 SEC (ref 23–32)
AST SERPL-CCNC: 52 U/L (ref 10–35)
B-HCG SERPL-ACNC: <5 MIU/ML
BASOPHILS # BLD AUTO: 86 CELLS/UL (ref 0–200)
BASOPHILS NFR BLD AUTO: 0.7 %
BILIRUB SERPL-MCNC: 1 MG/DL (ref 0.2–1.2)
BUN SERPL-MCNC: 12 MG/DL (ref 7–25)
CALCIUM SERPL-MCNC: 8.7 MG/DL (ref 8.6–10.2)
CHLORIDE SERPL-SCNC: 100 MMOL/L (ref 98–110)
CO2 SERPL-SCNC: 25 MMOL/L (ref 20–32)
CREAT SERPL-MCNC: 0.88 MG/DL (ref 0.5–0.99)
EGFRCR SERPLBLD CKD-EPI 2021: 82 ML/MIN/1.73M2
EOSINOPHIL # BLD AUTO: 49 CELLS/UL (ref 15–500)
EOSINOPHIL NFR BLD AUTO: 0.4 %
ERYTHROCYTE [DISTWIDTH] IN BLOOD BY AUTOMATED COUNT: 22.5 % (ref 11–15)
GLUCOSE SERPL-MCNC: 74 MG/DL (ref 65–99)
HCT VFR BLD AUTO: 38.7 % (ref 35–45)
HGB BLD-MCNC: 11.5 G/DL (ref 11.7–15.5)
INR PPP: 1
LYMPHOCYTES # BLD AUTO: 4613 CELLS/UL (ref 850–3900)
LYMPHOCYTES NFR BLD AUTO: 37.5 %
MCH RBC QN AUTO: 23.6 PG (ref 27–33)
MCHC RBC AUTO-ENTMCNC: 29.7 G/DL (ref 32–36)
MCV RBC AUTO: 79.3 FL (ref 80–100)
MONOCYTES # BLD AUTO: 984 CELLS/UL (ref 200–950)
MONOCYTES NFR BLD AUTO: 8 %
MORPHOLOGY BLD-IMP: ABNORMAL
NEUTROPHILS # BLD AUTO: 6568 CELLS/UL (ref 1500–7800)
NEUTROPHILS NFR BLD AUTO: 53.4 %
PLATELET # BLD AUTO: 736 THOUSAND/UL (ref 140–400)
PMV BLD REES-ECKER: 11.8 FL (ref 7.5–12.5)
POTASSIUM SERPL-SCNC: 4.8 MMOL/L (ref 3.5–5.3)
PROT SERPL-MCNC: 7.2 G/DL (ref 6.1–8.1)
PROTHROMBIN TIME: 11 SEC (ref 9–11.5)
RBC # BLD AUTO: 4.88 MILLION/UL (ref 3.8–5.1)
SODIUM SERPL-SCNC: 137 MMOL/L (ref 135–146)
WBC # BLD AUTO: 12.3 THOUSAND/UL (ref 3.8–10.8)

## 2025-04-21 ENCOUNTER — APPOINTMENT (OUTPATIENT)
Dept: PRIMARY CARE | Facility: CLINIC | Age: 50
End: 2025-04-21
Payer: COMMERCIAL

## 2025-04-21 VITALS
WEIGHT: 140 LBS | DIASTOLIC BLOOD PRESSURE: 70 MMHG | BODY MASS INDEX: 23.84 KG/M2 | HEART RATE: 130 BPM | SYSTOLIC BLOOD PRESSURE: 118 MMHG | OXYGEN SATURATION: 96 %

## 2025-04-21 DIAGNOSIS — R79.89 ABNORMAL CBC: ICD-10-CM

## 2025-04-21 DIAGNOSIS — R14.0 ABDOMINAL BLOATING: ICD-10-CM

## 2025-04-21 DIAGNOSIS — D47.3 ESSENTIAL (HEMORRHAGIC) THROMBOCYTHEMIA: ICD-10-CM

## 2025-04-21 DIAGNOSIS — R04.2 HEMOPTYSIS: ICD-10-CM

## 2025-04-21 DIAGNOSIS — I26.99 MULTIPLE PULMONARY EMBOLI (MULTI): Primary | ICD-10-CM

## 2025-04-21 DIAGNOSIS — I27.24 CTEPH (CHRONIC THROMBOEMBOLIC PULMONARY HYPERTENSION): ICD-10-CM

## 2025-04-21 DIAGNOSIS — R60.0 EDEMA OF RIGHT LOWER LEG: ICD-10-CM

## 2025-04-21 PROCEDURE — 99215 OFFICE O/P EST HI 40 MIN: CPT | Performed by: STUDENT IN AN ORGANIZED HEALTH CARE EDUCATION/TRAINING PROGRAM

## 2025-04-21 PROCEDURE — 96372 THER/PROPH/DIAG INJ SC/IM: CPT | Performed by: STUDENT IN AN ORGANIZED HEALTH CARE EDUCATION/TRAINING PROGRAM

## 2025-04-21 PROCEDURE — 1036F TOBACCO NON-USER: CPT | Performed by: STUDENT IN AN ORGANIZED HEALTH CARE EDUCATION/TRAINING PROGRAM

## 2025-04-21 ASSESSMENT — PATIENT HEALTH QUESTIONNAIRE - PHQ9
2. FEELING DOWN, DEPRESSED OR HOPELESS: NOT AT ALL
SUM OF ALL RESPONSES TO PHQ9 QUESTIONS 1 AND 2: 0
1. LITTLE INTEREST OR PLEASURE IN DOING THINGS: NOT AT ALL

## 2025-04-21 NOTE — PROGRESS NOTES
Subjective   Patient ID: Elise Chávez is a 49 y.o. female who presents for Multiple Pulmonary Emboli.    HPI   Patient is returning again to follow-up closely for her history of elevated platelets as well as her other ailments as noted in the chart below    Continues to have some minor shortness of breath with her right ankle/leg swelling with her increased heart rate but overall she feels that she is slightly getting better in these regards    Did have a flareup of her colitis recently and now noted that her abdominal region has been getting better as well    Her platelet cell count continues to decrease with her most recent value at 736 and she does have a hematology/oncology appointment tomorrow to discuss all of this further    At her last visit it was recommended to get a CT angio of her chest as well as abdomen/pelvis to be fully thorough on her underlying symptoms but unfortunately she has declined to get this done at this time as she was refusing any contrast    Today, again, she does feel slightly better but now wishes to follow these liver enzymes that have been noticed in her lab work    Wishes to discuss this further at today's visit    Review of Systems  All pertinent positive symptoms are included in the history of present illness.    All other systems have been reviewed and are negative and noncontributory to this patient's current ailments.    Objective   /70 (BP Location: Left arm, Patient Position: Sitting, BP Cuff Size: Adult)   Pulse (!) 130   Wt 63.5 kg (140 lb)   LMP  (LMP Unknown)   SpO2 96%   BMI 23.84 kg/m²     Physical Exam  CONSTITUTIONAL - well nourished, well developed, looks like stated age, in no acute distress, not ill-appearing, and not tired appearing  SKIN - normal skin color and pigmentation, normal skin turgor without rash, lesions, or nodules visualized  HEAD - no trauma, normocephalic  EYES - normal external exam  CHEST -no distressed breathing, good effort, lungs  clear to auscultation bilaterally, heart tachycardic  EXTREMITIES -pitting edema 2+ within the right lower extremity  NEUROLOGICAL - normal balance, normal motor, no ataxia  PSYCHIATRIC - alert, pleasant and cordial, age-appropriate    Assessment/Plan   We had another long conversation about all of your symptoms and I recommend that you obtain an ultrasound of your liver as well as consider the CT angio of your chest/abdomen/pelvis    I would first at least recommend you get the ultrasound of your liver since it does not have IV contrast involved slighty make sure that this BESREMi does not affect your liver overall    At any point if you notice worsening or acute signs/symptoms please notify me immediately and/or go to nearest emergency room to be acutely evaluated    Otherwise, we did give 200 mcg of BESREMi once again at today's visit    Please discuss all this in great detail with your hematologist/oncologist at your appointment tomorrow      Time Spent  Prep time on day of patient encounter: 5 minutes  Time spent directly with patient, family or caregiver: 35 minutes  Additional Time Spent on Patient Care Activities: 0 minutes  Documentation Time: 5 minutes  Other Time Spent: 0 minutes  Total: 45 minutes

## 2025-04-22 ENCOUNTER — OFFICE VISIT (OUTPATIENT)
Dept: HEMATOLOGY/ONCOLOGY | Facility: CLINIC | Age: 50
End: 2025-04-22
Payer: COMMERCIAL

## 2025-04-22 VITALS
WEIGHT: 141.3 LBS | OXYGEN SATURATION: 93 % | RESPIRATION RATE: 18 BRPM | HEART RATE: 118 BPM | SYSTOLIC BLOOD PRESSURE: 116 MMHG | DIASTOLIC BLOOD PRESSURE: 80 MMHG | TEMPERATURE: 96.6 F | BODY MASS INDEX: 24.07 KG/M2

## 2025-04-22 DIAGNOSIS — I27.82 CHRONIC PULMONARY EMBOLISM WITH ACUTE COR PULMONALE, UNSPECIFIED PULMONARY EMBOLISM TYPE (MULTI): Primary | ICD-10-CM

## 2025-04-22 DIAGNOSIS — I26.09 CHRONIC PULMONARY EMBOLISM WITH ACUTE COR PULMONALE, UNSPECIFIED PULMONARY EMBOLISM TYPE (MULTI): Primary | ICD-10-CM

## 2025-04-22 LAB
ALBUMIN SERPL-MCNC: 3.6 G/DL (ref 3.6–5.1)
ALP SERPL-CCNC: 80 U/L (ref 31–125)
ALT SERPL-CCNC: 30 U/L (ref 6–29)
ANION GAP SERPL CALCULATED.4IONS-SCNC: 8 MMOL/L (CALC) (ref 7–17)
APTT PPP: 30 SEC (ref 23–32)
AST SERPL-CCNC: 37 U/L (ref 10–35)
BASOPHILS # BLD AUTO: 29 CELLS/UL (ref 0–200)
BASOPHILS NFR BLD AUTO: 0.3 %
BILIRUB SERPL-MCNC: 0.9 MG/DL (ref 0.2–1.2)
BUN SERPL-MCNC: 11 MG/DL (ref 7–25)
CALCIUM SERPL-MCNC: 8.6 MG/DL (ref 8.6–10.2)
CHLORIDE SERPL-SCNC: 103 MMOL/L (ref 98–110)
CO2 SERPL-SCNC: 27 MMOL/L (ref 20–32)
CREAT SERPL-MCNC: 0.81 MG/DL (ref 0.5–0.99)
EGFRCR SERPLBLD CKD-EPI 2021: 90 ML/MIN/1.73M2
EOSINOPHIL # BLD AUTO: 10 CELLS/UL (ref 15–500)
EOSINOPHIL NFR BLD AUTO: 0.1 %
ERYTHROCYTE [DISTWIDTH] IN BLOOD BY AUTOMATED COUNT: 22.7 % (ref 11–15)
GLUCOSE SERPL-MCNC: 86 MG/DL (ref 65–99)
HCT VFR BLD AUTO: 38 % (ref 35–45)
HGB BLD-MCNC: 11.1 G/DL (ref 11.7–15.5)
INR PPP: 1
LYMPHOCYTES # BLD AUTO: 3366 CELLS/UL (ref 850–3900)
LYMPHOCYTES NFR BLD AUTO: 34.7 %
MCH RBC QN AUTO: 22.8 PG (ref 27–33)
MCHC RBC AUTO-ENTMCNC: 29.2 G/DL (ref 32–36)
MCV RBC AUTO: 78.2 FL (ref 80–100)
MONOCYTES # BLD AUTO: 844 CELLS/UL (ref 200–950)
MONOCYTES NFR BLD AUTO: 8.7 %
MORPHOLOGY BLD-IMP: ABNORMAL
NEUTROPHILS # BLD AUTO: 5451 CELLS/UL (ref 1500–7800)
NEUTROPHILS NFR BLD AUTO: 56.2 %
PLATELET # BLD AUTO: 619 THOUSAND/UL (ref 140–400)
PMV BLD REES-ECKER: 11.5 FL (ref 7.5–12.5)
POTASSIUM SERPL-SCNC: 4.6 MMOL/L (ref 3.5–5.3)
PROT SERPL-MCNC: 6.9 G/DL (ref 6.1–8.1)
PROTHROMBIN TIME: 10.7 SEC (ref 9–11.5)
RBC # BLD AUTO: 4.86 MILLION/UL (ref 3.8–5.1)
SODIUM SERPL-SCNC: 138 MMOL/L (ref 135–146)
WBC # BLD AUTO: 9.7 THOUSAND/UL (ref 3.8–10.8)

## 2025-04-22 PROCEDURE — 99205 OFFICE O/P NEW HI 60 MIN: CPT | Performed by: INTERNAL MEDICINE

## 2025-04-22 PROCEDURE — 99215 OFFICE O/P EST HI 40 MIN: CPT | Performed by: INTERNAL MEDICINE

## 2025-04-22 ASSESSMENT — PAIN SCALES - GENERAL: PAINLEVEL_OUTOF10: 10-WORST PAIN EVER

## 2025-04-22 NOTE — PROGRESS NOTES
Patient ID: Elise Chávez is a 49 y.o. female.    Diagnosis:   JAK2 positive myeloproliferative syndrome, hypercoagulable status    Treatment: Besremi    Response:     Past Medical History:    Post menopausal per report     colitis     CTEPH (chronic thromboembolic pulmonary hypertension) (HCC)      JAK2 gene mutation      Thrombocytosis        Surgical History:     THROMBECTOMY/MANUAL ASPIRATION  9/2021 5/2024                Family History:   No family history of blood cancer, dad with colon cancer, christie vet, mom has inflammatory bowel disease, stage I breast cancer.     Social History:   Single, unemployed,  worked for a consulting firm.  Has own place, no smoking, little alcohol   -------------------------------------------------------------------------------------------------------  Subjective       HPI    Ms. Elise Chávez is a 49 year old female with significant PMH of pulmonary embolism and CTEPH as below who presents to establish care with heme onc in [place] to aide with managing the dose of besremi. Her primary heme onc physician is Sam Franklin (he used to work at  and now works with [employer] in [place]).     Her history dates back to May 2019 when she was diagnosed with UC (ulcerative colitis). Then she took a trip to [place] and was diagnosed with Salmonella after which took a few months to recover from. The in Sep of 2019 she had cough and chest pain. They originally was not sure what this was from but because that was the beginnings of COVID-19 - retrospectively this was most likely from COVID-19.    9/29/19 - CT PE was done which did not show any PE (done at ). Subsegmental atelectasis in the left lung base. Minimal patchy subpleural linear opacity posteriorly in the right upper lobe with mild inward tenting of the pleural surface, likely related to scarring. Otherwise, within normal limits.      She then had her first PE 7/2020    CT on 7/30/20 (at ) - showed large filling defect in the distal  right main pulmonary artery, nearly occlusive, with occlusive embolus extending into the anterior right upper lung segmental pulmonary artery and nonocclusive emboli in the other right upper lung segmental pulmonary arteries. There is some occlusive thrombus seen in medial segment right middle lobe branch and multiple occlusive emboli in the subsegmental branches in the right lower lung, nearly occlusive thrombus in the right lower lobar pulmonary artery. There are no emboli seen in the left lung.   As per patient this PE was treated with Xarelto however this was complicated by hemoptysis so she stopped Xarelto around Nov 2020.     10/3/20 - CT PE (at ) showed no acute PE. But did show chronic PE in the RLL pulm arteries.   1/27/21 - CT PE - no acute PE but branched tree in bud appearance RUL.      2/15/21 - Acute pulmonary emboli involving the right inter lobar artery just after the takeoff of the right upper lobar branch. Clot is also seen more distally in segmental and subsegmental branches of the right upper, middle and lower lobes as well. There is also clot within segmental branches of the left lower lobe.   This time she was discharged on eliquis.      9/2/21 - CT PE Occlusive thrombus is seen in the right upper lobe, right middle lobe, and right lower lobe segmental and subsegmental pulmonary artery. Filling defect is also seen within the right main pulmonary artery. No saddle embolism is identified.   This time she needed a thrombectomy.      10/2021 - admitted with COVID-19   Then she did well until about Dec of 2022 when she had a colitis flare and lost about 30 lbs. This could have affected the absorption of eliquis that she was continuing to take at this time.      Then in 9/2023 she suffered her fourth PE - this was at [facility] but reports are not available in the system or careeverywhere. Eliquis was continued.   Around 12/2023 - she stopped eliquis after consulting with Dr. Franklin because she  was also taking a natural blood thinner called Natto      5/15/24 - CT PE Examination positive for pulmonary embolism with significant clot burden in the right lung and suggestion of right heart strain.   Flowtriever thrombectomy was done. She was started on pradaxa but this caused significant side effects including heartburn and hemoptysis.   She then switched back to eliquis along with Natto which she has been on since then.      9/12/24 - Large right-sided pulmonary embolus completely occluding the right pulmonary arterial system beginning in the right main pulmonary artery with evidence of right heart strain.   Question early pulmonary infarct in the right lower lobe. Mosaic attenuation in the lungs suggesting chronic small airway disease and air trapping.     10/31/24 - Enlargement of pre-existing completely occlusive thrombus within the right main pulmonary artery which now extends to the < 1 cm from the right/left pulmonary artery bifurcation, previously was 2.7 cm from the bifurcation. Worsening of main pulmonary artery dilatation worsening of right heart strain compared to 09/12/2024 and significantly greater compared to 10/20/2021. Patient is undergone attempted mechanical thrombectomy at outside institution on   (05/15/2024) demonstrating extensive chronic clot that was difficult to remove per outside note.      11/27/24 - admitted to [facility] with SOB, CTA PE showed Large occlusive thrombus in the right main pulmonary artery and its branches without any opacification of the pulmonary arteries throughout the right lung, Patient was started on IV heparin and admitted to MICU. Apixaban level was therapeutic so felt to be an apixaban failure. In the MICU, IR was consulted and felt that patient was not a candidate for thrombectomy based on past attempts. Patient was transitioned to therapeutic lovenox for anticoagulation.   DC on 12/1/24.      In regards to her ET and platelets - her platelets have been ranging  over a million for sometime. 950 in 2019 and platelets have been in that 1 million range since then. She also has leukocytosis with monocytosis, eosinophilia, basophilia and left shift.   10/24/19 - JAK2 mutation was detected   5/17/24 - JAK2 again detected V617F at 18.6% frequency   5/19/24 - BCR ABL Qual negative (looks for common and rare variants - this was performed at [facility] in [place])   As per direction from Dr. Franklin she started besremi in order to control her blood counts in July 29, 2024 at 50 mcg. On 12/2/24 besremi was increased to 150 mcg, increased to 200 mcg March 2025.  Her platelets over the last month finally down below 1 million.  She is getting besremi from her primary care physician.  She is having no side effects from her besremi.     Patient is seen today 4/22/25 for new patient visit for her diagnoses of presumed essential JAK2 positive thrombocytosis ( no bone marrow performed per patients preference) and long history of multiple life threatening PES,  She remains on Besremi ( Peg INF) which she has been receiving through her primary care physician under a compassionate use program.  She is very pleased that her platelets are now in to 600 range and is very eager to get off lovenox and transition back to either natural blood thinners or oral anticoagulation.  Of note: her  her right leg has been swollen since the beginning of March, ultrasound at that time showed no DVT.  Notes some pain over right breast when she coughs seems to be a surface or musculoskeletal pain and she has discussed this with her PCP.  She is concerned that her JAK2 myeloproliferative syndrome has stemmed from presumed long COVID (possibly in 2019). States she has abdominal distention.     Review of Systems   All other systems reviewed and are negative.     -------------------------------------------------------------------------------------------------------  Objective   BSA: 1.7 meters squared  /80    Pulse (!) 118   Temp 35.9 °C (96.6 °F)   Resp 18   Wt 64.1 kg (141 lb 4.8 oz)   LMP  (LMP Unknown)   SpO2 93%   BMI 24.07 kg/m²     Physical Exam  Constitutional:       Appearance: Normal appearance.   HENT:      Head: Normocephalic and atraumatic.      Nose: Nose normal.   Eyes:      Extraocular Movements: Extraocular movements intact.      Conjunctiva/sclera: Conjunctivae normal.      Pupils: Pupils are equal, round, and reactive to light.   Cardiovascular:      Rate and Rhythm: Normal rate and regular rhythm.      Comments: Negative Homans sign  Pulmonary:      Breath sounds: Normal breath sounds.   Abdominal:      General: Abdomen is flat. Bowel sounds are normal.      Palpations: Abdomen is soft.   Musculoskeletal:         General: Normal range of motion.      Right lower le+ Edema present.   Skin:     General: Skin is warm and dry.   Neurological:      General: No focal deficit present.      Mental Status: She is oriented to person, place, and time.   Psychiatric:         Mood and Affect: Mood normal.         Behavior: Behavior normal.         Thought Content: Thought content normal.       Performance Status:  Symptomatic; fully ambulatory  -------------------------------------------------------------------------------------------------------  Assessment/Plan      49 year old female with JAK2 positivity and with ulcerative colitis with recurrent PE presents to establish care.     Presumed ET    Thrombocytosis, leukocytosis   JAK2 positive   her platelets have been ranging over a million for sometime. 950 in 2019 and platelets have been in that 1 million range since then. She also has leukocytosis with monocytosis, eosinophilia, basophilia and left shift.   10/24/19 - JAK2 mutation was detected   24 - JAK2 again detected V617F at 18.6% frequency   24 - BCR ABL Qual negative (looks for common and rare variants - this was performed at [facility] in [place])      As per direction from   Rigoberto she started besremi in order to control her blood counts in July 2024 at 50 mcg. On 12/2/24 besremi was increased to 150 mcg and March increased to 200 mg with most recent platelet count 619,000.  She gets this every two weeks at her PCP office, but her PCP in retiring in August and she will need to make arrangements to continue this.  She is quite desirous to return to [place] to see Dr. Eleuterio Franklin    I agree with multiple previous providers who have recommended a bone marrow biopsy to determine exact diagnosis, suspect she may have myelofibrosis. Also agree with continuing current dosing of besremi at 200 mg every 2 weeks.       Recurrent PEs:  Patient has had multiple life-threatening PE most recently in November 2024 which was felt to be an eliquis failure.  Has been seen by [facility]. Arvizu at [facility] whom she trusts and who recommended ongoing parenteral lovenox. She will speak to him about whether she may be a candidate for once daily lovenox at 1.5 mg/kg instead of bid, suggested monitoring Factor Xa lovenox levels to assure patient therapeutic.  I explained to the patient that despite control of her platelets, given her myeloproliferative disease and history of multiple life threatening pulmonary emboli, she is still at considerable risk of recurrent clots.  I agree  strongly recommend life long effective anticoagulation.     Has follow up with vascular and pulm scheduled   Continue lifelong lovenox   Prothrombin gene mutation, FV leiden negative, APS testing negative      Ulcerative colitis   Needs to establish with GI as well      Discussed with patient that mutual trust in patient- physican relationship should be strongly considered in her decision where to pursue her medical care.     RTC:  recommend continuing followup with Dr. Topete end of June or early July,  recommend ultrasound of right leg, bone marrow biopsy and continue on besremi, continue injectable lovenox. Patient will let me know if/when  she desires followup in this clinic          -------------------------------------------------------------------------------------------------------  Loni Colin MD

## 2025-04-22 NOTE — RESULT ENCOUNTER NOTE
Liver function did decrease now with the AST at 37 and ALT at 30 which I am very happy about    Otherwise kidneys and electrolytes within normal limits

## 2025-04-22 NOTE — RESULT ENCOUNTER NOTE
Platelet cell count continues to drop now at 619 with previous at 736    Hemoglobin does show a slight anemia at 11.1 but the white blood cell count is within normal limits which is the first time in many months/years    We are still waiting for the liver function enzymes at this time as well as her CMP    PT/INR as well as PTT within normal limits

## 2025-04-23 ENCOUNTER — SOCIAL WORK (OUTPATIENT)
Dept: CASE MANAGEMENT | Facility: HOSPITAL | Age: 50
End: 2025-04-23
Payer: COMMERCIAL

## 2025-04-23 NOTE — PROGRESS NOTES
Social Work Note    Referral Source: New Patient Visit with Dr. Colin  Meeting Location: In-Person  Person(s) Present: Patient  Identified Needs: SW Introduction, utilities resources  Impression and Plan: Patient is a 49 year old female with dx thrombocytopenia and multiple PE. SW met with patient at [facility] with Dr. Colin. Patient is establishing care. Patient reported she had COVID and feels her medical issues had stemmed from COVID. Patient lives alone. She is currently unemployed. Patient reported she was laid off in 2023. She was a . She no longer receives unemployment. She is currently going through the interview process. Patient reports she has ulcerative colitis and needs to have a [employer] job to be near a bathroom. Patient reports she is on Medicaid and has food stamps. The food stamps cover her food needs. Patient reports she has drained her savings. She has people helping pay her bills. SW reviewed there are programs in [facility] to help with utilities assistance. SW reviewed [place] [id number] and [employer] [place] [facility] [place]. Patient plans to look it up. SW provided patient with SW contact information and reviewed role. SW provided support.   Interventions Provided: Referral to Community Resource and Supportive Counseling  Estimated Time Spent: 15    SW will continue to follow as needed.

## 2025-04-24 ENCOUNTER — HOSPITAL ENCOUNTER (OUTPATIENT)
Dept: RADIOLOGY | Facility: CLINIC | Age: 50
Discharge: HOME | End: 2025-04-24
Payer: COMMERCIAL

## 2025-04-24 DIAGNOSIS — R60.0 EDEMA OF RIGHT LOWER LEG: ICD-10-CM

## 2025-04-24 DIAGNOSIS — D47.3 ESSENTIAL (HEMORRHAGIC) THROMBOCYTHEMIA: ICD-10-CM

## 2025-04-24 DIAGNOSIS — R14.0 ABDOMINAL BLOATING: ICD-10-CM

## 2025-04-24 DIAGNOSIS — I26.99 MULTIPLE PULMONARY EMBOLI (MULTI): ICD-10-CM

## 2025-04-24 DIAGNOSIS — I27.24 CTEPH (CHRONIC THROMBOEMBOLIC PULMONARY HYPERTENSION): ICD-10-CM

## 2025-04-24 DIAGNOSIS — R04.2 HEMOPTYSIS: ICD-10-CM

## 2025-04-24 DIAGNOSIS — R79.89 ABNORMAL CBC: ICD-10-CM

## 2025-04-24 PROCEDURE — 76705 ECHO EXAM OF ABDOMEN: CPT | Performed by: RADIOLOGY

## 2025-04-24 PROCEDURE — 76705 ECHO EXAM OF ABDOMEN: CPT

## 2025-04-25 NOTE — RESULT ENCOUNTER NOTE
There is a large gallstone within her gallbladder at 2.8 cm  There is also mild thickening of the gallbladder wall    It does not look like there is an acute cholecystitis going on right now or any inflammation    Also, her liver is slightly enlarged at this time    All of these elevated readings could be due to the medication she is taking or even could be due to this gallstone affecting her gallbladder    At this time, I would recommend she considers following up with a general surgeon to discuss this further if she would like    Please advise

## 2025-04-29 DIAGNOSIS — I27.24 CTEPH (CHRONIC THROMBOEMBOLIC PULMONARY HYPERTENSION): ICD-10-CM

## 2025-04-29 DIAGNOSIS — D47.3 ESSENTIAL (HEMORRHAGIC) THROMBOCYTHEMIA: ICD-10-CM

## 2025-04-29 DIAGNOSIS — I26.99 MULTIPLE PULMONARY EMBOLI (MULTI): ICD-10-CM

## 2025-04-29 DIAGNOSIS — R79.89 ABNORMAL CBC: ICD-10-CM

## 2025-04-29 RX ORDER — ROPEGINTERFERON ALFA-2B 500 UG/ML
200 INJECTION SUBCUTANEOUS
Qty: 2 ML | Refills: 1 | Status: SHIPPED | OUTPATIENT
Start: 2025-04-29

## 2025-05-05 ENCOUNTER — OFFICE VISIT (OUTPATIENT)
Dept: PRIMARY CARE | Facility: CLINIC | Age: 50
End: 2025-05-05
Payer: COMMERCIAL

## 2025-05-05 ENCOUNTER — APPOINTMENT (OUTPATIENT)
Dept: OTOLARYNGOLOGY | Facility: CLINIC | Age: 50
End: 2025-05-05
Payer: COMMERCIAL

## 2025-05-05 VITALS — BODY MASS INDEX: 23.16 KG/M2 | WEIGHT: 136 LBS

## 2025-05-05 DIAGNOSIS — I26.99 MULTIPLE PULMONARY EMBOLI (MULTI): Primary | ICD-10-CM

## 2025-05-05 DIAGNOSIS — I27.24 CTEPH (CHRONIC THROMBOEMBOLIC PULMONARY HYPERTENSION): ICD-10-CM

## 2025-05-05 DIAGNOSIS — D47.3 ESSENTIAL (HEMORRHAGIC) THROMBOCYTHEMIA: ICD-10-CM

## 2025-05-05 DIAGNOSIS — R79.89 ABNORMAL CBC: ICD-10-CM

## 2025-05-05 DIAGNOSIS — J34.89 NASAL SEPTUM PERFORATION: Primary | ICD-10-CM

## 2025-05-05 PROCEDURE — 1036F TOBACCO NON-USER: CPT | Performed by: STUDENT IN AN ORGANIZED HEALTH CARE EDUCATION/TRAINING PROGRAM

## 2025-05-05 PROCEDURE — G8433 SCR FOR DEP NOT CPT DOC RSN: HCPCS | Performed by: GENERAL PRACTICE

## 2025-05-05 PROCEDURE — 99213 OFFICE O/P EST LOW 20 MIN: CPT | Performed by: GENERAL PRACTICE

## 2025-05-05 PROCEDURE — 96372 THER/PROPH/DIAG INJ SC/IM: CPT | Performed by: STUDENT IN AN ORGANIZED HEALTH CARE EDUCATION/TRAINING PROGRAM

## 2025-05-05 PROCEDURE — 1036F TOBACCO NON-USER: CPT | Performed by: GENERAL PRACTICE

## 2025-05-05 NOTE — PROGRESS NOTES
Otolaryngology - Head and Neck Surgery Outpatient Established Patient Visit Note        Assessment/Plan:   Problem List Items Addressed This Visit    None  Visit Diagnoses         Codes      Nasal septum perforation    -  Primary J34.89              Some decrease in size of septal perforation.  Discussed ongoing use of saline gel and avoidance of septal trauma.      Follow up in 3-6mo or sooner for concerns.  Consider future septal repair or button.      Follow up:  -plan for follow up in clinic as needed    All of the above findings, impressions, treatment planning and follow up plans were discussed with the patient who indicated understanding.  the patient was instructed to contact or return to clinic sooner if symptoms/signs persist or worsen despite the above management.      Donny Wiggins MD  Otolaryngology - Head and Neck Surgery            History Of Present Illness  Elise Chávez is a 49 y.o. female presenting for follow up of septal perforation.   Reports some decrease in nasal irritation, whistling/crusting.   Some ongoing nasal congestion.,   Using saline gel and intermittent bactroban.   Not using nasacort (doesn't like sprays in nose).     Recall  Elise Chávez is a 49 y.o. female presenting for evaluation of septal perforation     Reports recent increase in epistaxis associated with lovenox management for history of PE.   Reports intermittent epistaxis, and crusted/clotted debris in nose.   Reports nasal obstruction due to nasal debris and picking at debris.  Noted to have septal perofration at recent PCM exam.     Denies priro nasal trauma/surgery  Reports chronic nasal obstruction and whistling associated with perforation  Reports intermittent blood and mucoid debris in nose.   No prior intranasal drug use     Reports no significant allergic rhinitis or recurrent sinusitis.           Past Medical History  She has a past medical history of Chronic pancolonic ulcerative colitis (Multi) (11/04/2024), Encounter  for follow-up examination after completed treatment for conditions other than malignant neoplasm (10/25/2021), Encounter for surveillance of contraceptives, unspecified (08/11/2020), Hemoptysis (05/23/2023), Melena, Other conditions influencing health status, Personal history of other endocrine, nutritional and metabolic disease, Personal history of other specified conditions (12/02/2013), Personal history of other specified conditions (10/16/2020), Personal history of other specified conditions, and Personal history of urinary calculi (12/02/2013).    Surgical History  She has a past surgical history that includes Refractive surgery (06/11/2013); Other surgical history (06/11/2013); and Lithotripsy (12/10/2013).     Social History  She reports that she has never smoked. She has never used smokeless tobacco. No history on file for alcohol use and drug use.    Family History  Family History[1]     Allergies  Patient has no known allergies.    Review of Systems  ROS: Pertinent positives as noted in HPI.    - CONSTITUTIONAL: Does not report weight loss, fever or chills.    - HEENT:   Ear: Does not report tinnitus, vertigo, hearing loss, otalgia, otorrhea  Nose: Does not report  ,  , epistaxis, decreased smell  Throat: Does not report pain, dysphagia, odynophagia  Larynx: Does not report hoarseness,  difficulty breathing, pain with speaking (odynophonia)  Neck: Does not report new masses, pain, swelling  Face: Does not report sinus pain, pressure, swelling, numbness, weakness     - RESPIRATORY: Does not report SOB or cough.    - CV: Does not report palpitations or chest pain.     - GI: Does not report abdominal pain, nausea, vomiting or diarrhea.    - : Does not report dysuria or urinary frequency.    - MSK: Does not report myalgia or joint pain.    - SKIN: Does not report rash or pruritus.    - NEUROLOGICAL: Does not report headache or syncope.    - PSYCHIATRIC: Does not report recent changes in mood. Does not report  anxiety or depression.         Physical Exam:     GENERAL:   Alert & Oriented to person, place and time; Normal affect and appearance. Well developed and well nourished. Conversant & cooperative with examination.     HEAD:   Normocephalic, atraumatic. No sinus tenderness to palpation. Normal parotid bilaterally. Normal facial strength.     NEUROLOGIC:   Cranial nerves II-XII grossly intact, gait WNL. Normal mood and affect.    EYES:   Extraocular movements intact. Pupils equal, round, reactive to light and accommodation. No nystagmus, no ptosis. no scleral injection.    EAR:   Normal auricle. No discomfort or TTP with manipulation.   Handheld otoscopic exam showed normal external auditory canals bilaterally. No purulence or EAC inflammation. Minimal cerumen.   Right tympanic membrane clear and mobile without evidence of perforation, retraction or middle ear effusion.   Left tympanic membrane clear and mobile without evidence of perforation, retraction or middle ear effusion.     NOSE:   No external deformity. No external nasal lesions, lacerations, or scars. Nasal tip symmetrical with normal nasal valves.   Nasal cavity with <1cm perforation in essentially midline septum, normal mucosa and turbinates. Some dry mucoid debris associated but otherwise no lesions, masses, purulence or polyps.     OC/OP:   Mucous membranes moist, no masses, lesions or exudates.   Normal tongue, floor of mouth, teeth, gums, lips. Normal posterior pharyngeal wall.    Normal tonsils without erythema, exudate or obvious calculi     NECK:   No neck masses or thyroid enlargement. Trachea midline. No tenderness to palpation    LYMPHATIC:   No cervical lymphadenopathy.     RESPIRATORY:   Symmetric chest elevation & no retractions. No significant hoarseness. No increased work of breathing.    CV:   No clubbing or cyanosis. No obvious edema    Skin:   No facial rashes, vesicles or lesions.     Extremities:   No gross abnormalities      Clinic  Procedure        Information review:  External sources (notes, imaging, lab results) listed below personally reviewed to aid in medical decision making process.  -  -  -         [1] No family history on file.

## 2025-05-06 LAB
ALBUMIN SERPL-MCNC: 3.6 G/DL (ref 3.6–5.1)
ALP SERPL-CCNC: 89 U/L (ref 31–125)
ALT SERPL-CCNC: 15 U/L (ref 6–29)
ANION GAP SERPL CALCULATED.4IONS-SCNC: 9 MMOL/L (CALC) (ref 7–17)
APTT PPP: 31 SEC (ref 23–32)
AST SERPL-CCNC: 24 U/L (ref 10–35)
BASOPHILS # BLD AUTO: 52 CELLS/UL (ref 0–200)
BASOPHILS NFR BLD AUTO: 0.5 %
BILIRUB SERPL-MCNC: 0.8 MG/DL (ref 0.2–1.2)
BUN SERPL-MCNC: 14 MG/DL (ref 7–25)
CALCIUM SERPL-MCNC: 8.6 MG/DL (ref 8.6–10.2)
CHLORIDE SERPL-SCNC: 104 MMOL/L (ref 98–110)
CO2 SERPL-SCNC: 26 MMOL/L (ref 20–32)
CREAT SERPL-MCNC: 0.77 MG/DL (ref 0.5–0.99)
EGFRCR SERPLBLD CKD-EPI 2021: 96 ML/MIN/1.73M2
EOSINOPHIL # BLD AUTO: 10 CELLS/UL (ref 15–500)
EOSINOPHIL NFR BLD AUTO: 0.1 %
ERYTHROCYTE [DISTWIDTH] IN BLOOD BY AUTOMATED COUNT: 24.3 % (ref 11–15)
GLUCOSE SERPL-MCNC: 99 MG/DL (ref 65–99)
HCT VFR BLD AUTO: 37.7 % (ref 35–45)
HGB BLD-MCNC: 11.3 G/DL (ref 11.7–15.5)
INR PPP: 1
LYMPHOCYTES # BLD AUTO: 3827 CELLS/UL (ref 850–3900)
LYMPHOCYTES NFR BLD AUTO: 36.8 %
MCH RBC QN AUTO: 23.9 PG (ref 27–33)
MCHC RBC AUTO-ENTMCNC: 30 G/DL (ref 32–36)
MCV RBC AUTO: 79.7 FL (ref 80–100)
MONOCYTES # BLD AUTO: 884 CELLS/UL (ref 200–950)
MONOCYTES NFR BLD AUTO: 8.5 %
NEUTROPHILS # BLD AUTO: 5626 CELLS/UL (ref 1500–7800)
NEUTROPHILS NFR BLD AUTO: 54.1 %
PLATELET # BLD AUTO: 495 THOUSAND/UL (ref 140–400)
PMV BLD REES-ECKER: ABNORMAL FL
POTASSIUM SERPL-SCNC: 4.1 MMOL/L (ref 3.5–5.3)
PROT SERPL-MCNC: 7.1 G/DL (ref 6.1–8.1)
PROTHROMBIN TIME: 10.5 SEC (ref 9–11.5)
RBC # BLD AUTO: 4.73 MILLION/UL (ref 3.8–5.1)
SERVICE CMNT-IMP: ABNORMAL
SODIUM SERPL-SCNC: 139 MMOL/L (ref 135–146)
WBC # BLD AUTO: 10.4 THOUSAND/UL (ref 3.8–10.8)

## 2025-05-06 NOTE — RESULT ENCOUNTER NOTE
Complete blood cell count continues to show a decrease of platelet cells as well as continued anemia that appears to be microcytic in nature    Platelet cell count now is at 495    We will continue following closely    Sugar, kidneys, liver, electrolytes are now all well within normal limits    PTT as well as INR well within normal limits

## 2025-05-08 NOTE — PROGRESS NOTES
Chronic history of pulmonary emboli  Injection was provided by the patient and was administered today by Cesilia Andrade MA   97.9

## 2025-05-19 ENCOUNTER — APPOINTMENT (OUTPATIENT)
Dept: PRIMARY CARE | Facility: CLINIC | Age: 50
End: 2025-05-19
Payer: COMMERCIAL

## 2025-05-19 VITALS
HEART RATE: 135 BPM | BODY MASS INDEX: 23.61 KG/M2 | DIASTOLIC BLOOD PRESSURE: 60 MMHG | WEIGHT: 138.6 LBS | OXYGEN SATURATION: 97 % | SYSTOLIC BLOOD PRESSURE: 102 MMHG

## 2025-05-19 DIAGNOSIS — D47.3 ESSENTIAL (HEMORRHAGIC) THROMBOCYTHEMIA: ICD-10-CM

## 2025-05-19 DIAGNOSIS — I26.99 MULTIPLE PULMONARY EMBOLI (MULTI): Primary | ICD-10-CM

## 2025-05-19 DIAGNOSIS — I27.24 CTEPH (CHRONIC THROMBOEMBOLIC PULMONARY HYPERTENSION): ICD-10-CM

## 2025-05-19 DIAGNOSIS — R04.2 HEMOPTYSIS: ICD-10-CM

## 2025-05-19 DIAGNOSIS — R79.89 ABNORMAL CBC: ICD-10-CM

## 2025-05-19 NOTE — PROGRESS NOTES
Besremi Injection provided by the patient was administered by Cesilia Andrade MA without complication

## 2025-05-19 NOTE — PROGRESS NOTES
Subjective   Patient ID: Elise Chávez is a 49 y.o. female who presents for Multiple Pulmonary Emboli.    HPI   Patient presenting to the office today to follow-up for multiple concerns as noted in the chart    Continues to have right-sided leg swelling that she will be following up with a specialty provider in the near future    Otherwise, has been A drastic improvement with her previous CBC that noted her platelet cell count being at 495 which was the lowest in her chart    She continues to be excited about all of her lab work as well as continuing the BESREMi  Her liver function also normalized that she continues to take supplements for this from a different provider    Otherwise, she will be following up with other specialty providers in the near future  Also, requesting her BESREMi injection today    Review of Systems  All pertinent positive symptoms are included in the history of present illness.    All other systems have been reviewed and are negative and noncontributory to this patient's current ailments.    Objective   /60 (BP Location: Left arm, Patient Position: Sitting, BP Cuff Size: Adult)   Pulse (!) 135   Wt 62.9 kg (138 lb 9.6 oz)   LMP  (LMP Unknown)   SpO2 97%   BMI 23.61 kg/m²     Physical Exam  CONSTITUTIONAL - well nourished, well developed, looks like stated age, in no acute distress, not ill-appearing, and not tired appearing  SKIN - normal skin color and pigmentation, normal skin turgor without rash, lesions, or nodules visualized  HEAD - no trauma, normocephalic  EYES - normal external exam  CHEST -no distressed breathing, good effort  EXTREMITIES - no edema, no deformities  NEUROLOGICAL - normal balance, normal motor, no ataxia  PSYCHIATRIC - alert, pleasant and cordial, age-appropriate    Assessment/Plan   I once again recommended that you consider imaging that was placed in the chart    We did provide BESREMi at 200 and you tolerated the procedure/injection well    Ultimately,  please follow-up with the specialty providers as indicated in the chart    Time Spent  Prep time on day of patient encounter: 5 minutes  Time spent directly with patient, family or caregiver: 35 minutes  Additional Time Spent on Patient Care Activities: 0 minutes  Documentation Time: 5 minutes  Other Time Spent: 0 minutes  Total: 45 minutes

## 2025-05-20 LAB
ALBUMIN SERPL-MCNC: 3.8 G/DL (ref 3.6–5.1)
ALP SERPL-CCNC: 96 U/L (ref 31–125)
ALT SERPL-CCNC: 34 U/L (ref 6–29)
ANION GAP SERPL CALCULATED.4IONS-SCNC: 10 MMOL/L (CALC) (ref 7–17)
APTT PPP: 28 SEC (ref 23–32)
AST SERPL-CCNC: 42 U/L (ref 10–35)
BASOPHILS # BLD AUTO: 49 CELLS/UL (ref 0–200)
BASOPHILS NFR BLD AUTO: 0.5 %
BILIRUB SERPL-MCNC: 1.2 MG/DL (ref 0.2–1.2)
BUN SERPL-MCNC: 9 MG/DL (ref 7–25)
CALCIUM SERPL-MCNC: 8.9 MG/DL (ref 8.6–10.2)
CHLORIDE SERPL-SCNC: 102 MMOL/L (ref 98–110)
CO2 SERPL-SCNC: 26 MMOL/L (ref 20–32)
CREAT SERPL-MCNC: 0.83 MG/DL (ref 0.5–0.99)
EGFRCR SERPLBLD CKD-EPI 2021: 87 ML/MIN/1.73M2
EOSINOPHIL # BLD AUTO: 176 CELLS/UL (ref 15–500)
EOSINOPHIL NFR BLD AUTO: 1.8 %
ERYTHROCYTE [DISTWIDTH] IN BLOOD BY AUTOMATED COUNT: 24.3 % (ref 11–15)
GLUCOSE SERPL-MCNC: 86 MG/DL (ref 65–99)
HCT VFR BLD AUTO: 41.4 % (ref 35–45)
HGB BLD-MCNC: 12 G/DL (ref 11.7–15.5)
INR PPP: 1
LYMPHOCYTES # BLD AUTO: 3606 CELLS/UL (ref 850–3900)
LYMPHOCYTES NFR BLD AUTO: 36.8 %
MCH RBC QN AUTO: 23.7 PG (ref 27–33)
MCHC RBC AUTO-ENTMCNC: 29 G/DL (ref 32–36)
MCV RBC AUTO: 81.8 FL (ref 80–100)
MONOCYTES # BLD AUTO: 804 CELLS/UL (ref 200–950)
MONOCYTES NFR BLD AUTO: 8.2 %
NEUTROPHILS # BLD AUTO: 5165 CELLS/UL (ref 1500–7800)
NEUTROPHILS NFR BLD AUTO: 52.7 %
PLATELET # BLD AUTO: 482 THOUSAND/UL (ref 140–400)
PMV BLD REES-ECKER: ABNORMAL FL
POTASSIUM SERPL-SCNC: 4.5 MMOL/L (ref 3.5–5.3)
PROT SERPL-MCNC: 7.2 G/DL (ref 6.1–8.1)
PROTHROMBIN TIME: 10.4 SEC (ref 9–11.5)
RBC # BLD AUTO: 5.06 MILLION/UL (ref 3.8–5.1)
SERVICE CMNT-IMP: ABNORMAL
SODIUM SERPL-SCNC: 138 MMOL/L (ref 135–146)
WBC # BLD AUTO: 9.8 THOUSAND/UL (ref 3.8–10.8)

## 2025-05-20 NOTE — RESULT ENCOUNTER NOTE
Sugar, kidneys, electrolytes within normal limits    Liver function increased once again to 42 and 34 respectively    Complete blood cell count continues to look fantastic with her platelet cell count slowly decreasing further down to 482  This is great!  Also, white blood cell count one of the lowest on file and the anemia is resolved    PT/INR and PTT well within normal limits

## 2025-05-30 ENCOUNTER — PATIENT MESSAGE (OUTPATIENT)
Dept: PRIMARY CARE | Facility: CLINIC | Age: 50
End: 2025-05-30
Payer: COMMERCIAL

## 2025-05-30 DIAGNOSIS — R79.89 ELEVATED CORTISOL LEVEL: ICD-10-CM

## 2025-06-02 ENCOUNTER — APPOINTMENT (OUTPATIENT)
Dept: PRIMARY CARE | Facility: CLINIC | Age: 50
End: 2025-06-02
Payer: COMMERCIAL

## 2025-06-02 VITALS — OXYGEN SATURATION: 96 % | DIASTOLIC BLOOD PRESSURE: 60 MMHG | HEART RATE: 148 BPM | SYSTOLIC BLOOD PRESSURE: 102 MMHG

## 2025-06-02 DIAGNOSIS — I27.24 CTEPH (CHRONIC THROMBOEMBOLIC PULMONARY HYPERTENSION): ICD-10-CM

## 2025-06-02 DIAGNOSIS — D47.3 ESSENTIAL (HEMORRHAGIC) THROMBOCYTHEMIA: ICD-10-CM

## 2025-06-02 DIAGNOSIS — R60.0 EDEMA OF LEFT LOWER LEG: ICD-10-CM

## 2025-06-02 DIAGNOSIS — Z13.29 SCREENING FOR THYROID DISORDER: ICD-10-CM

## 2025-06-02 DIAGNOSIS — Z13.6 SCREENING FOR CARDIOVASCULAR CONDITION: ICD-10-CM

## 2025-06-02 DIAGNOSIS — Z00.00 HEALTHCARE MAINTENANCE: Primary | ICD-10-CM

## 2025-06-02 DIAGNOSIS — I26.99 MULTIPLE PULMONARY EMBOLI (MULTI): ICD-10-CM

## 2025-06-02 DIAGNOSIS — R79.89 ABNORMAL CBC: ICD-10-CM

## 2025-06-02 DIAGNOSIS — Z13.0 SCREENING FOR BLOOD DISEASE: ICD-10-CM

## 2025-06-02 DIAGNOSIS — R60.0 EDEMA OF RIGHT LOWER LEG: ICD-10-CM

## 2025-06-02 DIAGNOSIS — Z13.1 SCREENING FOR DIABETES MELLITUS: ICD-10-CM

## 2025-06-02 PROCEDURE — 1036F TOBACCO NON-USER: CPT | Performed by: STUDENT IN AN ORGANIZED HEALTH CARE EDUCATION/TRAINING PROGRAM

## 2025-06-02 PROCEDURE — 99214 OFFICE O/P EST MOD 30 MIN: CPT | Performed by: STUDENT IN AN ORGANIZED HEALTH CARE EDUCATION/TRAINING PROGRAM

## 2025-06-02 PROCEDURE — 96372 THER/PROPH/DIAG INJ SC/IM: CPT | Performed by: STUDENT IN AN ORGANIZED HEALTH CARE EDUCATION/TRAINING PROGRAM

## 2025-06-02 NOTE — PROGRESS NOTES
Subjective   Patient ID: Elise Chávez is a 49 y.o. female who presents for Multiple Pulmonary Emboli.    HPI   Patient is coming into the office again to discuss about multiple concerns and over the chart below    Today she is willing to receive another BESREMi injection but wishes to be at a lower dose of 150 mcg  She will be following up with a new hematology/oncology specialist tomorrow to discuss this further    Also, she continues to have signs/symptoms of bilateral leg swelling with the right worse than the left as well as some increased shortness of breath  She did have a lesion on her leg and was wondering late last week to make sure there is nothing underlying    Willing to discuss an ultrasound at this time but is slightly hesitant to perform a CT scan chest/abdomen that was ordered a couple of months ago and was recommended at that time    Her heart rate is elevated above 140 but denies any other cardiopulmonary symptoms other than the minor shortness of breath and slight cough    Wishes to discuss a few of these things today as noted in the chart    Review of Systems  All pertinent positive symptoms are included in the history of present illness.    All other systems have been reviewed and are negative and noncontributory to this patient's current ailments.    Objective   /60 (BP Location: Left arm, Patient Position: Sitting, BP Cuff Size: Adult)   Pulse (!) 148   LMP  (LMP Unknown)   SpO2 96%     Physical Exam  CONSTITUTIONAL - well nourished, well developed, looks like stated age, in no acute distress, not ill-appearing, and not tired appearing  SKIN - normal skin color and pigmentation, normal skin turgor without rash, lesions, or nodules visualized  HEAD - no trauma, normocephalic  EYES - normal external exam  CHEST -no distressed breathing, good effort  EXTREMITIES -bilateral edema noted with the right worse than the left, left at 1+, right 3+ or above, negative calf pain  NEUROLOGICAL -  normal balance, normal motor, no ataxia  PSYCHIATRIC - alert, pleasant and cordial, age-appropriate    Assessment/Plan   We had another long conversation about all of your signs/symptoms as well as history    I recommended that you even consider going to the emergency room to make sure that we are not missing anything, also, I know you are slightly hesitant to do so but I would recommend that you obtain the CT scan of both your abdomen/pelvis as well as your chest ASAP that was ordered a few months ago  I did also recommend once again the emergency room but you declined    Lastly, please obtain the ultrasound of your legs as well alongside your lab work at your earliest future convenience    I understand that you are losing hair upon our conversation and I did order all of your health maintenance lab work alongside the typical lab work that we have been following the BESREMi with    BESREMi also provided to you today

## 2025-06-03 LAB
25(OH)D3+25(OH)D2 SERPL-MCNC: 83 NG/ML (ref 30–100)
ALBUMIN SERPL-MCNC: 3.4 G/DL (ref 3.6–5.1)
ALP SERPL-CCNC: 84 U/L (ref 31–125)
ALT SERPL-CCNC: 15 U/L (ref 6–29)
ANION GAP SERPL CALCULATED.4IONS-SCNC: 8 MMOL/L (CALC) (ref 7–17)
APTT PPP: 28 SEC (ref 23–32)
AST SERPL-CCNC: 25 U/L (ref 10–35)
BASOPHILS # BLD AUTO: 53 CELLS/UL (ref 0–200)
BASOPHILS NFR BLD AUTO: 0.6 %
BILIRUB SERPL-MCNC: 0.9 MG/DL (ref 0.2–1.2)
BUN SERPL-MCNC: 9 MG/DL (ref 7–25)
CALCIUM SERPL-MCNC: 8.4 MG/DL (ref 8.6–10.2)
CHLORIDE SERPL-SCNC: 106 MMOL/L (ref 98–110)
CHOLEST SERPL-MCNC: 127 MG/DL
CHOLEST/HDLC SERPL: 4.9 (CALC)
CO2 SERPL-SCNC: 26 MMOL/L (ref 20–32)
CORTIS SERPL-MCNC: 28.1 MCG/DL
CREAT SERPL-MCNC: 0.78 MG/DL (ref 0.5–0.99)
EGFRCR SERPLBLD CKD-EPI 2021: 94 ML/MIN/1.73M2
EOSINOPHIL # BLD AUTO: 0 CELLS/UL (ref 15–500)
EOSINOPHIL NFR BLD AUTO: 0 %
ERYTHROCYTE [DISTWIDTH] IN BLOOD BY AUTOMATED COUNT: 23.8 % (ref 11–15)
EST. AVERAGE GLUCOSE BLD GHB EST-MCNC: 123 MG/DL
EST. AVERAGE GLUCOSE BLD GHB EST-SCNC: 6.8 MMOL/L
GLUCOSE SERPL-MCNC: 86 MG/DL (ref 65–99)
HBA1C MFR BLD: 5.9 %
HCT VFR BLD AUTO: 39.3 % (ref 35–45)
HDLC SERPL-MCNC: 26 MG/DL
HGB BLD-MCNC: 11.8 G/DL (ref 11.7–15.5)
INR PPP: 1
LDLC SERPL CALC-MCNC: 85 MG/DL (CALC)
LYMPHOCYTES # BLD AUTO: 2697 CELLS/UL (ref 850–3900)
LYMPHOCYTES NFR BLD AUTO: 30.3 %
MAGNESIUM SERPL-MCNC: 1.9 MG/DL (ref 1.5–2.5)
MCH RBC QN AUTO: 25.2 PG (ref 27–33)
MCHC RBC AUTO-ENTMCNC: 30 G/DL (ref 32–36)
MCV RBC AUTO: 83.8 FL (ref 80–100)
MONOCYTES # BLD AUTO: 650 CELLS/UL (ref 200–950)
MONOCYTES NFR BLD AUTO: 7.3 %
MORPHOLOGY BLD-IMP: ABNORMAL
NEUTROPHILS # BLD AUTO: 5500 CELLS/UL (ref 1500–7800)
NEUTROPHILS NFR BLD AUTO: 61.8 %
NONHDLC SERPL-MCNC: 101 MG/DL (CALC)
PLATELET # BLD AUTO: 542 THOUSAND/UL (ref 140–400)
PMV BLD REES-ECKER: 12 FL (ref 7.5–12.5)
POTASSIUM SERPL-SCNC: 3.7 MMOL/L (ref 3.5–5.3)
PROT SERPL-MCNC: 6.9 G/DL (ref 6.1–8.1)
PROTHROMBIN TIME: 10.8 SEC (ref 9–11.5)
RBC # BLD AUTO: 4.69 MILLION/UL (ref 3.8–5.1)
SODIUM SERPL-SCNC: 140 MMOL/L (ref 135–146)
TRIGL SERPL-MCNC: 74 MG/DL
TSH SERPL-ACNC: 2.15 MIU/L
WBC # BLD AUTO: 8.9 THOUSAND/UL (ref 3.8–10.8)

## 2025-06-03 NOTE — RESULT ENCOUNTER NOTE
Cholesterol looks great at 127, HDL 26, triglycerides 74, LDL 85    Sugar, kidneys, liver, electrolytes are all well within normal limits and/were stable  Albumin and calcium are slightly decreased but I am not fully concerned    Complete blood cell count continues to look great but unfortunately her platelet cell count did increase back up to 542  She needs to follow-up and discuss this further with her blood specialist that I know she followed up with today    Hemoglobin A1c slightly elevated 5.9%    Cortisol surprisingly elevated at 28  I would recommend that she follows up with endocrinology to make sure there is nothing underlying  My office staff will place a requisition in the chart    Thyroid within normal limits alongside a normal vitamin D and normal magnesium    PT/INR within normal limits

## 2025-06-04 ENCOUNTER — HOSPITAL ENCOUNTER (OUTPATIENT)
Dept: VASCULAR MEDICINE | Facility: CLINIC | Age: 50
Discharge: HOME | End: 2025-06-04
Payer: COMMERCIAL

## 2025-06-04 ENCOUNTER — HOSPITAL ENCOUNTER (OUTPATIENT)
Dept: RADIOLOGY | Facility: CLINIC | Age: 50
Discharge: HOME | End: 2025-06-04
Payer: COMMERCIAL

## 2025-06-04 DIAGNOSIS — R60.0 EDEMA OF LEFT LOWER LEG: ICD-10-CM

## 2025-06-04 DIAGNOSIS — D47.3 ESSENTIAL (HEMORRHAGIC) THROMBOCYTHEMIA: ICD-10-CM

## 2025-06-04 DIAGNOSIS — R04.2 HEMOPTYSIS: ICD-10-CM

## 2025-06-04 DIAGNOSIS — R79.89 ABNORMAL CBC: ICD-10-CM

## 2025-06-04 DIAGNOSIS — I26.99 MULTIPLE PULMONARY EMBOLI (MULTI): ICD-10-CM

## 2025-06-04 DIAGNOSIS — R60.0 EDEMA OF RIGHT LOWER LEG: ICD-10-CM

## 2025-06-04 DIAGNOSIS — I27.24 CTEPH (CHRONIC THROMBOEMBOLIC PULMONARY HYPERTENSION): ICD-10-CM

## 2025-06-04 DIAGNOSIS — R14.0 ABDOMINAL BLOATING: ICD-10-CM

## 2025-06-04 PROCEDURE — 93970 EXTREMITY STUDY: CPT | Performed by: SURGERY

## 2025-06-04 PROCEDURE — 2550000001 HC RX 255 CONTRASTS: Performed by: STUDENT IN AN ORGANIZED HEALTH CARE EDUCATION/TRAINING PROGRAM

## 2025-06-04 PROCEDURE — 93970 EXTREMITY STUDY: CPT

## 2025-06-04 PROCEDURE — 71275 CT ANGIOGRAPHY CHEST: CPT

## 2025-06-04 PROCEDURE — 74174 CTA ABD&PLVS W/CONTRAST: CPT

## 2025-06-04 RX ADMIN — IOHEXOL 100 ML: 350 INJECTION, SOLUTION INTRAVENOUS at 10:46

## 2025-06-04 NOTE — RESULT ENCOUNTER NOTE
CT angio of her abdomen/pelvis continues to show other abnormal findings    There are findings of small ascites and increased periportal and generalized body wall edema which may be related to the right heart strain in the setting of pulmonary embolism being there    This could be directly affecting and showing why the patient is holding fluid as well as noticing lower extremity edema especially within her right leg    There is also diffuse inflammatory imaging as well as wall thickening extending from the rectum to the splenic flexure with improvement when compared to previous CT scans    These findings are consistent with known ulcerative colitis    There is also interval development of gallbladder wall thickening as well as fluid without any gallbladder distention  This is again most likely related to edema/congestion/ascites that is most likely due to the pulmonary emboli causing heart strain which is now affecting her fluid in her body alongside back up    This again points towards this pulmonary embolism needing to be removed and evaluated/treated

## 2025-06-04 NOTE — RESULT ENCOUNTER NOTE
CT angio chest noted a right alert with multiple findings that are concerning    There is ultimately a stable cardiomegaly/enlarged heart as well as a moderate-sized nonspecific pericardial effusion that is slightly larger today than when compared to previous exam that was performed in the fall 2024    There is underlying pulmonary artery hypertension as well as there is a large embolism/thrombus/clot that is completely occluding the right side of the main pulmonary artery near its origin  Essentially this is unchanged when compared to previous exams in 2024 but this is affecting her heart and other parts of her body    I would strongly encourage that she discuss this further with her hematologist at [facility] as well as consider following up with a cardiothoracic surgeon to most likely get this clot removed since it continues to be very large and disrupting flow    She should get a thrombectomy but I know she has denied this multiple times in the past    There is a small left pleural effusion/fluid    There is also stable nonspecific groundglass appearance that has multiple differentials    There is also a new noncalcified nonspecific 5 mm right lower lobe nodule that we should follow-up within the next 6-9 months    There is also stable nonspecific adenopathy that is most likely reactive    We are still waiting for the CT angiogram of the abdomen and pelvis but this has not been fully read/resulted    I recommend she discuss this immediately with her hematologist as well as if she notices worsening symptoms she should go to the emergency room to be acutely evaluated and even consider having surgery of getting this clot taken out

## 2025-06-16 ENCOUNTER — APPOINTMENT (OUTPATIENT)
Dept: PRIMARY CARE | Facility: CLINIC | Age: 50
End: 2025-06-16
Payer: COMMERCIAL

## 2025-06-16 VITALS
SYSTOLIC BLOOD PRESSURE: 94 MMHG | BODY MASS INDEX: 22.72 KG/M2 | DIASTOLIC BLOOD PRESSURE: 60 MMHG | WEIGHT: 133.4 LBS | OXYGEN SATURATION: 96 % | HEART RATE: 129 BPM

## 2025-06-16 DIAGNOSIS — D47.3 ESSENTIAL (HEMORRHAGIC) THROMBOCYTHEMIA: ICD-10-CM

## 2025-06-16 DIAGNOSIS — R14.0 ABDOMINAL BLOATING: ICD-10-CM

## 2025-06-16 DIAGNOSIS — R60.0 EDEMA OF RIGHT LOWER LEG: ICD-10-CM

## 2025-06-16 DIAGNOSIS — R79.89 ABNORMAL CBC: ICD-10-CM

## 2025-06-16 DIAGNOSIS — I27.24 CTEPH (CHRONIC THROMBOEMBOLIC PULMONARY HYPERTENSION): ICD-10-CM

## 2025-06-16 DIAGNOSIS — I26.99 MULTIPLE PULMONARY EMBOLI (MULTI): Primary | ICD-10-CM

## 2025-06-16 PROCEDURE — 1036F TOBACCO NON-USER: CPT | Performed by: STUDENT IN AN ORGANIZED HEALTH CARE EDUCATION/TRAINING PROGRAM

## 2025-06-16 PROCEDURE — 99214 OFFICE O/P EST MOD 30 MIN: CPT | Performed by: STUDENT IN AN ORGANIZED HEALTH CARE EDUCATION/TRAINING PROGRAM

## 2025-06-16 PROCEDURE — 96372 THER/PROPH/DIAG INJ SC/IM: CPT | Performed by: STUDENT IN AN ORGANIZED HEALTH CARE EDUCATION/TRAINING PROGRAM

## 2025-06-16 RX ORDER — FUROSEMIDE 40 MG/1
40 TABLET ORAL
COMMUNITY
Start: 2025-06-13

## 2025-06-16 NOTE — PROGRESS NOTES
Subjective   Patient ID: Elise Chávez is a 49 y.o. female who presents for Multiple Pulmonary Emboli.    HPI   Patient is presenting to the office today to follow-up for continued issues as indicated in the chart    Continues to take BESREMi as any in the chart and now she recently established with a new hematology/oncology provider, Dr. Ramos.    Her most recent appointment with him was on 6/3/2025  It was recommended that she slowly increase her BESREMi As tolerated as well as it was discussed that she should maybe discuss the pros and cons of adding on anticoagulation once again    Ultimately I would recommend that she should look into taking Coumadin or even Lovenox but she is slightly reluctant at this time    She did do a vast amount of blood work as indicated the chart to further evaluate her underlying condition    It was also noted that just the other day, 6/13, she did follow-up with a vascular provider    At that time it was recommended that she also could be having fluid retention due to the BESREMi    Also, it was recommended that she should look into this possible and underlying untreated pulmonary hypertension    Also of note, we did do a vascular ultrasound as well as a stat CT angio chest/Abdomen/pelvis which continues to show a stable cardiomegaly as well as a moderate-sized nonspecific pericardial effusion    There is underlying pulmonary hypertension as there is a large embolism that was completely occluding the right side of the main pulmonary artery    Also CT of the abdomen did show small volume ascites with increased periportal and generalized body wall edema related to the right heart strain      Review of Systems  All pertinent positive symptoms are included in the history of present illness.    All other systems have been reviewed and are negative and noncontributory to this patient's current ailments.    Objective   BP 94/60 (BP Location: Left arm, Patient Position: Sitting, BP Cuff Size:  Adult)   Pulse (!) 129   Wt 60.5 kg (133 lb 6.4 oz)   LMP  (LMP Unknown)   SpO2 96%   BMI 22.72 kg/m²     Physical Exam  CONSTITUTIONAL - well nourished, well developed, looks like stated age, in no acute distress, not ill-appearing, and not tired appearing  SKIN - normal skin color and pigmentation, normal skin turgor without rash, lesions, or nodules visualized  HEAD - no trauma, normocephalic  EYES - normal external exam  CHEST -no distressed breathing, good effort  EXTREMITIES -bilateral edema noted with the right worse than the left, left at 1+, right 3+ or above, negative calf pain  NEUROLOGICAL - normal balance, normal motor, no ataxia  PSYCHIATRIC - alert, pleasant and cordial, age-appropriate    Assessment/Plan   Another long conversation occurred at today's visit    We did discuss all of your findings on the CT scans and what the specialty providers were recommending    I do truly recommend you continue following up with the multiple specialty providers at [facility] so we can continue monitoring closely    I know we did provide 150 of the BESREMi today per your request  It was stated that the hematology/oncology provider wishes to slowly increase further but I understand that some of the side effects could be due to the BESREMi    I did state my concern about where your symptoms could be coming from in relation to the likely pulmonary hypertension    Will continue following closely

## 2025-06-17 LAB
ALBUMIN SERPL-MCNC: 3.9 G/DL (ref 3.6–5.1)
ALP SERPL-CCNC: 103 U/L (ref 31–125)
ALT SERPL-CCNC: 26 U/L (ref 6–29)
ANION GAP SERPL CALCULATED.4IONS-SCNC: 12 MMOL/L (CALC) (ref 7–17)
APTT PPP: 27 SEC (ref 23–32)
AST SERPL-CCNC: 45 U/L (ref 10–35)
BASOPHILS # BLD AUTO: 38 CELLS/UL (ref 0–200)
BASOPHILS NFR BLD AUTO: 0.5 %
BILIRUB SERPL-MCNC: 1.3 MG/DL (ref 0.2–1.2)
BUN SERPL-MCNC: 13 MG/DL (ref 7–25)
CALCIUM SERPL-MCNC: 9.1 MG/DL (ref 8.6–10.2)
CHLORIDE SERPL-SCNC: 97 MMOL/L (ref 98–110)
CO2 SERPL-SCNC: 30 MMOL/L (ref 20–32)
CREAT SERPL-MCNC: 0.81 MG/DL (ref 0.5–0.99)
EGFRCR SERPLBLD CKD-EPI 2021: 90 ML/MIN/1.73M2
EOSINOPHIL # BLD AUTO: 23 CELLS/UL (ref 15–500)
EOSINOPHIL NFR BLD AUTO: 0.3 %
ERYTHROCYTE [DISTWIDTH] IN BLOOD BY AUTOMATED COUNT: 22.6 % (ref 11–15)
GLUCOSE SERPL-MCNC: 90 MG/DL (ref 65–99)
HCT VFR BLD AUTO: 42.4 % (ref 35–45)
HGB BLD-MCNC: 12.6 G/DL (ref 11.7–15.5)
INR PPP: 1
LYMPHOCYTES # BLD AUTO: 2645 CELLS/UL (ref 850–3900)
LYMPHOCYTES NFR BLD AUTO: 34.8 %
MAGNESIUM SERPL-MCNC: 2 MG/DL (ref 1.5–2.5)
MCH RBC QN AUTO: 24.8 PG (ref 27–33)
MCHC RBC AUTO-ENTMCNC: 29.7 G/DL (ref 32–36)
MCV RBC AUTO: 83.5 FL (ref 80–100)
MONOCYTES # BLD AUTO: 707 CELLS/UL (ref 200–950)
MONOCYTES NFR BLD AUTO: 9.3 %
MORPHOLOGY BLD-IMP: ABNORMAL
NEUTROPHILS # BLD AUTO: 4188 CELLS/UL (ref 1500–7800)
NEUTROPHILS NFR BLD AUTO: 55.1 %
PLATELET # BLD AUTO: 588 THOUSAND/UL (ref 140–400)
PMV BLD REES-ECKER: 12.6 FL (ref 7.5–12.5)
POTASSIUM SERPL-SCNC: 4.4 MMOL/L (ref 3.5–5.3)
PROT SERPL-MCNC: 8.2 G/DL (ref 6.1–8.1)
PROTHROMBIN TIME: 10.4 SEC (ref 9–11.5)
RBC # BLD AUTO: 5.08 MILLION/UL (ref 3.8–5.1)
SODIUM SERPL-SCNC: 139 MMOL/L (ref 135–146)
WBC # BLD AUTO: 7.6 THOUSAND/UL (ref 3.8–10.8)

## 2025-06-17 NOTE — RESULT ENCOUNTER NOTE
Complete blood cell count continues to be great in regards to the white blood cell count now being normal    Platelet cell count unfortunately did increase slightly up to 588 but overall is still much lower than what she was in the past    I would recommend she continues following up with her hematology provider at the clinic    Sugar, kidneys, electrolytes within normal limits and/were stable  Chloride is slightly down but the AST, liver function, slightly elevated but we will need to follow this closely    PT/INR and PTT within normal limits alongside a normal magnesium

## 2025-06-30 ENCOUNTER — APPOINTMENT (OUTPATIENT)
Dept: PRIMARY CARE | Facility: CLINIC | Age: 50
End: 2025-06-30
Payer: COMMERCIAL

## 2025-06-30 ENCOUNTER — OFFICE VISIT (OUTPATIENT)
Dept: PRIMARY CARE | Facility: CLINIC | Age: 50
End: 2025-06-30
Payer: COMMERCIAL

## 2025-06-30 DIAGNOSIS — R79.89 ABNORMAL CBC: ICD-10-CM

## 2025-06-30 DIAGNOSIS — D47.3 ESSENTIAL (HEMORRHAGIC) THROMBOCYTHEMIA: ICD-10-CM

## 2025-06-30 DIAGNOSIS — R14.0 ABDOMINAL BLOATING: ICD-10-CM

## 2025-06-30 DIAGNOSIS — R60.0 EDEMA OF RIGHT LOWER LEG: ICD-10-CM

## 2025-06-30 DIAGNOSIS — I26.99 MULTIPLE PULMONARY EMBOLI (MULTI): Primary | ICD-10-CM

## 2025-06-30 DIAGNOSIS — I27.24 CTEPH (CHRONIC THROMBOEMBOLIC PULMONARY HYPERTENSION): ICD-10-CM

## 2025-06-30 DIAGNOSIS — I26.99 MULTIPLE PULMONARY EMBOLI (MULTI): ICD-10-CM

## 2025-06-30 PROCEDURE — 96372 THER/PROPH/DIAG INJ SC/IM: CPT | Performed by: STUDENT IN AN ORGANIZED HEALTH CARE EDUCATION/TRAINING PROGRAM

## 2025-06-30 NOTE — PROGRESS NOTES
Patient supplied Besremi Injection was administered in the office today by Cesilia Andrade MA without complication.

## 2025-07-01 LAB
ALBUMIN SERPL-MCNC: 3.9 G/DL (ref 3.6–5.1)
ALP SERPL-CCNC: 112 U/L (ref 31–125)
ALT SERPL-CCNC: 69 U/L (ref 6–29)
ANION GAP SERPL CALCULATED.4IONS-SCNC: 9 MMOL/L (CALC) (ref 7–17)
APTT PPP: 28 SEC (ref 23–32)
AST SERPL-CCNC: 74 U/L (ref 10–35)
BASOPHILS # BLD AUTO: 36 CELLS/UL (ref 0–200)
BASOPHILS NFR BLD AUTO: 0.4 %
BILIRUB SERPL-MCNC: 1.2 MG/DL (ref 0.2–1.2)
BUN SERPL-MCNC: 13 MG/DL (ref 7–25)
CALCIUM SERPL-MCNC: 9.1 MG/DL (ref 8.6–10.2)
CHLORIDE SERPL-SCNC: 99 MMOL/L (ref 98–110)
CO2 SERPL-SCNC: 29 MMOL/L (ref 20–32)
CREAT SERPL-MCNC: 0.73 MG/DL (ref 0.5–0.99)
EGFRCR SERPLBLD CKD-EPI 2021: 102 ML/MIN/1.73M2
EOSINOPHIL # BLD AUTO: 9 CELLS/UL (ref 15–500)
EOSINOPHIL NFR BLD AUTO: 0.1 %
ERYTHROCYTE [DISTWIDTH] IN BLOOD BY AUTOMATED COUNT: 21.2 % (ref 11–15)
GLUCOSE SERPL-MCNC: 83 MG/DL (ref 65–99)
HCT VFR BLD AUTO: 42.4 % (ref 35–45)
HGB BLD-MCNC: 12.4 G/DL (ref 11.7–15.5)
INR PPP: 0.9
LYMPHOCYTES # BLD AUTO: 2412 CELLS/UL (ref 850–3900)
LYMPHOCYTES NFR BLD AUTO: 26.5 %
MAGNESIUM SERPL-MCNC: 2 MG/DL (ref 1.5–2.5)
MCH RBC QN AUTO: 24.8 PG (ref 27–33)
MCHC RBC AUTO-ENTMCNC: 29.2 G/DL (ref 32–36)
MCV RBC AUTO: 85 FL (ref 80–100)
MONOCYTES # BLD AUTO: 664 CELLS/UL (ref 200–950)
MONOCYTES NFR BLD AUTO: 7.3 %
NEUTROPHILS # BLD AUTO: 5979 CELLS/UL (ref 1500–7800)
NEUTROPHILS NFR BLD AUTO: 65.7 %
PLATELET # BLD AUTO: 462 THOUSAND/UL (ref 140–400)
PMV BLD REES-ECKER: 11.2 FL (ref 7.5–12.5)
POTASSIUM SERPL-SCNC: 4.1 MMOL/L (ref 3.5–5.3)
PROT SERPL-MCNC: 7.9 G/DL (ref 6.1–8.1)
PROTHROMBIN TIME: 10.2 SEC (ref 9–11.5)
RBC # BLD AUTO: 4.99 MILLION/UL (ref 3.8–5.1)
SODIUM SERPL-SCNC: 137 MMOL/L (ref 135–146)
WBC # BLD AUTO: 9.1 THOUSAND/UL (ref 3.8–10.8)

## 2025-07-01 NOTE — RESULT ENCOUNTER NOTE
Complete blood cell count continues to be impressive with a platelet cell count of 462 which is great    Anemia is no longer present which I am very happy to see    Sugar, kidneys, electrolytes well within normal limits    Liver function is now back elevated as well as one of the highest values on file  We need to continue following this closely    PT/INR and PTT are within normal limits alongside a normal magnesium

## 2025-07-15 ENCOUNTER — APPOINTMENT (OUTPATIENT)
Dept: PRIMARY CARE | Facility: CLINIC | Age: 50
End: 2025-07-15
Payer: COMMERCIAL

## 2025-07-15 VITALS
HEART RATE: 135 BPM | DIASTOLIC BLOOD PRESSURE: 70 MMHG | OXYGEN SATURATION: 92 % | BODY MASS INDEX: 21.56 KG/M2 | SYSTOLIC BLOOD PRESSURE: 94 MMHG | WEIGHT: 126.6 LBS

## 2025-07-15 DIAGNOSIS — R79.89 ABNORMAL CBC: ICD-10-CM

## 2025-07-15 DIAGNOSIS — M13.0 ARTHRITIS OF MULTIPLE SITES: ICD-10-CM

## 2025-07-15 DIAGNOSIS — I27.24 CTEPH (CHRONIC THROMBOEMBOLIC PULMONARY HYPERTENSION): ICD-10-CM

## 2025-07-15 DIAGNOSIS — D47.3 ESSENTIAL (HEMORRHAGIC) THROMBOCYTHEMIA: ICD-10-CM

## 2025-07-15 DIAGNOSIS — I26.99 MULTIPLE PULMONARY EMBOLI (MULTI): Primary | ICD-10-CM

## 2025-07-15 DIAGNOSIS — R60.0 EDEMA OF RIGHT LOWER LEG: ICD-10-CM

## 2025-07-15 PROCEDURE — 1036F TOBACCO NON-USER: CPT | Performed by: STUDENT IN AN ORGANIZED HEALTH CARE EDUCATION/TRAINING PROGRAM

## 2025-07-15 PROCEDURE — 99215 OFFICE O/P EST HI 40 MIN: CPT | Performed by: STUDENT IN AN ORGANIZED HEALTH CARE EDUCATION/TRAINING PROGRAM

## 2025-07-15 ASSESSMENT — PATIENT HEALTH QUESTIONNAIRE - PHQ9
SUM OF ALL RESPONSES TO PHQ9 QUESTIONS 1 AND 2: 0
1. LITTLE INTEREST OR PLEASURE IN DOING THINGS: NOT AT ALL
2. FEELING DOWN, DEPRESSED OR HOPELESS: NOT AT ALL

## 2025-07-15 NOTE — PROGRESS NOTES
Subjective   Patient ID: Elise Chávez is a 49 y.o. female who presents for Follow-up.    HPI   Patient is presenting to the office today to follow-up for continued issues as indicated in the chart    Continues to take BESREMi as any in the chart and now she recently established with a new hematology/oncology provider, Dr. Ramos.    Her next appointment will be on July 23    Requesting to have her BESREMi injection provided today    Recently did have multiple CT scans and imaging performed that did show pulmonary hypertension  There continues to be a large embolism that was occluding the right side of the main pulmonary artery    There is also some ascites with increased periportal and generalized body wall edema most likely due to the right sided heart strain    Today she is coming to the office to follow-up for both blood work as well as her injection as stated above  Wishing to discuss this further today            Review of Systems  All pertinent positive symptoms are included in the history of present illness.    All other systems have been reviewed and are negative and noncontributory to this patient's current ailments.    Objective   BP 94/70 (BP Location: Left arm, Patient Position: Sitting, BP Cuff Size: Adult)   Pulse (!) 135   Wt 57.4 kg (126 lb 9.6 oz)   LMP  (LMP Unknown)   SpO2 92%   BMI 21.56 kg/m²     Physical Exam  CONSTITUTIONAL - well nourished, well developed, looks like stated age, in no acute distress, not ill-appearing, and not tired appearing  SKIN - normal skin color and pigmentation, normal skin turgor without rash, lesions, or nodules visualized  HEAD - no trauma, normocephalic  EYES - normal external exam  CHEST -no distressed breathing, good effort  EXTREMITIES -bilateral edema noted with the right worse than the left, left at 1+, right 2+   NEUROLOGICAL - normal balance, normal motor, no ataxia  PSYCHIATRIC - alert, pleasant and cordial, age-appropriate    Assessment/Plan   Another  long conversation occurred at today's visit    We did discuss all of your findings on the CT scans and what the specialty providers were recommending once again    I do truly recommend you continue following up with the multiple specialty providers at [facility] so we can continue monitoring closely    I know we did provide 200 of the BESREMi today per your request  It was stated that the hematology/oncology provider wishes to slowly increase further but I understand that some of the side effects could be due to the BESREMi    I did state my concern about where your symptoms could be coming from in relation to the likely pulmonary hypertension    Will continue following closely    Time Spent  Prep time on day of patient encounter: 5 minutes  Time spent directly with patient, family or caregiver: 30 minutes  Additional Time Spent on Patient Care Activities: 0 minutes  Documentation Time: 5 minutes  Other Time Spent: 0 minutes  Total: 40 minutes

## 2025-07-16 LAB
ALBUMIN SERPL-MCNC: 4 G/DL (ref 3.6–5.1)
ALP SERPL-CCNC: 115 U/L (ref 31–125)
ALT SERPL-CCNC: 10 U/L (ref 6–29)
ANION GAP SERPL CALCULATED.4IONS-SCNC: 9 MMOL/L (CALC) (ref 7–17)
APTT PPP: 29 SEC (ref 23–32)
AST SERPL-CCNC: 16 U/L (ref 10–35)
BASOPHILS # BLD AUTO: 42 CELLS/UL (ref 0–200)
BASOPHILS NFR BLD AUTO: 0.4 %
BILIRUB SERPL-MCNC: 1.3 MG/DL (ref 0.2–1.2)
BUN SERPL-MCNC: 12 MG/DL (ref 7–25)
CALCIUM SERPL-MCNC: 9.2 MG/DL (ref 8.6–10.2)
CHLORIDE SERPL-SCNC: 104 MMOL/L (ref 98–110)
CO2 SERPL-SCNC: 26 MMOL/L (ref 20–32)
CREAT SERPL-MCNC: 0.79 MG/DL (ref 0.5–0.99)
EGFRCR SERPLBLD CKD-EPI 2021: 93 ML/MIN/1.73M2
EOSINOPHIL # BLD AUTO: 21 CELLS/UL (ref 15–500)
EOSINOPHIL NFR BLD AUTO: 0.2 %
ERYTHROCYTE [DISTWIDTH] IN BLOOD BY AUTOMATED COUNT: 20.8 % (ref 11–15)
GLUCOSE SERPL-MCNC: 93 MG/DL (ref 65–99)
HCT VFR BLD AUTO: 42.7 % (ref 35–45)
HGB BLD-MCNC: 12.9 G/DL (ref 11.7–15.5)
INR PPP: 1
LYMPHOCYTES # BLD AUTO: 2194 CELLS/UL (ref 850–3900)
LYMPHOCYTES NFR BLD AUTO: 21.1 %
MAGNESIUM SERPL-MCNC: 1.9 MG/DL (ref 1.5–2.5)
MCH RBC QN AUTO: 25.7 PG (ref 27–33)
MCHC RBC AUTO-ENTMCNC: 30.2 G/DL (ref 32–36)
MCV RBC AUTO: 85.2 FL (ref 80–100)
MONOCYTES # BLD AUTO: 1123 CELLS/UL (ref 200–950)
MONOCYTES NFR BLD AUTO: 10.8 %
NEUTROPHILS # BLD AUTO: 7020 CELLS/UL (ref 1500–7800)
NEUTROPHILS NFR BLD AUTO: 67.5 %
PLATELET # BLD AUTO: 524 THOUSAND/UL (ref 140–400)
PMV BLD REES-ECKER: 11.5 FL (ref 7.5–12.5)
POTASSIUM SERPL-SCNC: 4.4 MMOL/L (ref 3.5–5.3)
PROT SERPL-MCNC: 8.3 G/DL (ref 6.1–8.1)
PROTHROMBIN TIME: 10.8 SEC (ref 9–11.5)
RBC # BLD AUTO: 5.01 MILLION/UL (ref 3.8–5.1)
SODIUM SERPL-SCNC: 139 MMOL/L (ref 135–146)
WBC # BLD AUTO: 10.4 THOUSAND/UL (ref 3.8–10.8)

## 2025-07-28 ENCOUNTER — APPOINTMENT (OUTPATIENT)
Dept: PRIMARY CARE | Facility: CLINIC | Age: 50
End: 2025-07-28
Payer: COMMERCIAL

## 2025-07-28 VITALS
SYSTOLIC BLOOD PRESSURE: 102 MMHG | DIASTOLIC BLOOD PRESSURE: 60 MMHG | OXYGEN SATURATION: 95 % | BODY MASS INDEX: 22.72 KG/M2 | HEART RATE: 127 BPM | WEIGHT: 133.4 LBS

## 2025-07-28 DIAGNOSIS — I26.99 MULTIPLE PULMONARY EMBOLI (MULTI): Primary | ICD-10-CM

## 2025-07-28 DIAGNOSIS — M13.0 ARTHRITIS OF MULTIPLE SITES: ICD-10-CM

## 2025-07-28 DIAGNOSIS — I27.24 CTEPH (CHRONIC THROMBOEMBOLIC PULMONARY HYPERTENSION): ICD-10-CM

## 2025-07-28 DIAGNOSIS — D47.3 ESSENTIAL (HEMORRHAGIC) THROMBOCYTHEMIA: ICD-10-CM

## 2025-07-28 DIAGNOSIS — R79.89 ABNORMAL CBC: ICD-10-CM

## 2025-07-28 DIAGNOSIS — R60.0 EDEMA OF RIGHT LOWER LEG: ICD-10-CM

## 2025-07-28 PROCEDURE — 96372 THER/PROPH/DIAG INJ SC/IM: CPT | Performed by: STUDENT IN AN ORGANIZED HEALTH CARE EDUCATION/TRAINING PROGRAM

## 2025-07-28 PROCEDURE — 99214 OFFICE O/P EST MOD 30 MIN: CPT | Performed by: STUDENT IN AN ORGANIZED HEALTH CARE EDUCATION/TRAINING PROGRAM

## 2025-07-28 PROCEDURE — 1036F TOBACCO NON-USER: CPT | Performed by: STUDENT IN AN ORGANIZED HEALTH CARE EDUCATION/TRAINING PROGRAM

## 2025-07-28 NOTE — PROGRESS NOTES
Subjective   Patient ID: Elise Chávez is a 49 y.o. female who presents for Follow-up.    HPI   Patient is presenting to the office today to follow-up for continued issues as indicated in the chart    Continues to take BESREMi as any in the chart and now she recently established with a new hematology/oncology provider, Dr. Ramos.  Currently taking 150 mcg every 2 weeks    Recently had an appointment on July 23 and was recommended that she continue the course and follow-up with blood work every 2 weeks    Did also follow-up with her functional medicine provider who diagnosed her with a parasite  She is now taking supplements but stated that she had to wean off of the slightly due to some GI side effects    Wishing and willing to get blood work repeated after today's visit                    Review of Systems  All pertinent positive symptoms are included in the history of present illness.    All other systems have been reviewed and are negative and noncontributory to this patient's current ailments.    Objective   /60 (BP Location: Left arm, Patient Position: Sitting, BP Cuff Size: Adult)   Pulse (!) 127   Wt 60.5 kg (133 lb 6.4 oz)   LMP  (LMP Unknown)   SpO2 95%   BMI 22.72 kg/m²     Physical Exam  CONSTITUTIONAL -slightly cachectic, slightly pale looks like stated age, in no acute distress, not ill-appearing, and not tired appearing  SKIN -slightly pale, normal skin turgor without rash, lesions, or nodules visualized  HEAD - no trauma, normocephalic  EYES - normal external exam  CHEST -no distressed breathing, good effort  EXTREMITIES -bilateral edema noted with the right worse than the left, left at 1+, right 2+   NEUROLOGICAL - normal balance, normal motor, no ataxia  PSYCHIATRIC - alert, pleasant and cordial, age-appropriate    Assessment/Plan   Another long conversation occurred at today's visit    I do truly recommend you continue following up with the multiple specialty providers at [facility]  so we can continue monitoring closely    Will continue the BESREMi at the current dosage per your recommendations and wishing's  It was stated that the hematology/oncology provider wishes to slowly increase further but I understand that some of the side effects could be due to the BESREMi    Will provide 150 mcg daily at today's visit    Lab work ordered and we will notify of the results and make recommendations accordingly    Otherwise, please follow-up with my resident provider since I am leaving [facility] at the end of the week

## 2025-07-29 LAB
ALBUMIN SERPL-MCNC: 3.5 G/DL (ref 3.6–5.1)
ALP SERPL-CCNC: 108 U/L (ref 31–125)
ALT SERPL-CCNC: 7 U/L (ref 6–29)
ANION GAP SERPL CALCULATED.4IONS-SCNC: 9 MMOL/L (CALC) (ref 7–17)
APTT PPP: 27 SEC (ref 23–32)
AST SERPL-CCNC: 15 U/L (ref 10–35)
BASOPHILS # BLD AUTO: 53 CELLS/UL (ref 0–200)
BASOPHILS NFR BLD AUTO: 0.6 %
BILIRUB SERPL-MCNC: 1 MG/DL (ref 0.2–1.2)
BUN SERPL-MCNC: 11 MG/DL (ref 7–25)
CALCIUM SERPL-MCNC: 8.5 MG/DL (ref 8.6–10.2)
CHLORIDE SERPL-SCNC: 104 MMOL/L (ref 98–110)
CO2 SERPL-SCNC: 29 MMOL/L (ref 20–32)
CREAT SERPL-MCNC: 0.66 MG/DL (ref 0.5–0.99)
EGFRCR SERPLBLD CKD-EPI 2021: 109 ML/MIN/1.73M2
EOSINOPHIL # BLD AUTO: 79 CELLS/UL (ref 15–500)
EOSINOPHIL NFR BLD AUTO: 0.9 %
ERYTHROCYTE [DISTWIDTH] IN BLOOD BY AUTOMATED COUNT: 20.2 % (ref 11–15)
GLUCOSE SERPL-MCNC: 85 MG/DL (ref 65–99)
HCT VFR BLD AUTO: 39.5 % (ref 35–45)
HGB BLD-MCNC: 12 G/DL (ref 11.7–15.5)
INR PPP: 1
LYMPHOCYTES # BLD AUTO: 2033 CELLS/UL (ref 850–3900)
LYMPHOCYTES NFR BLD AUTO: 23.1 %
MAGNESIUM SERPL-MCNC: 1.9 MG/DL (ref 1.5–2.5)
MCH RBC QN AUTO: 26 PG (ref 27–33)
MCHC RBC AUTO-ENTMCNC: 30.4 G/DL (ref 32–36)
MCV RBC AUTO: 85.5 FL (ref 80–100)
MONOCYTES # BLD AUTO: 660 CELLS/UL (ref 200–950)
MONOCYTES NFR BLD AUTO: 7.5 %
NEUTROPHILS # BLD AUTO: 5975 CELLS/UL (ref 1500–7800)
NEUTROPHILS NFR BLD AUTO: 67.9 %
PLATELET # BLD AUTO: 541 THOUSAND/UL (ref 140–400)
PMV BLD REES-ECKER: 10.9 FL (ref 7.5–12.5)
POTASSIUM SERPL-SCNC: 3.9 MMOL/L (ref 3.5–5.3)
PROT SERPL-MCNC: 7.4 G/DL (ref 6.1–8.1)
PROTHROMBIN TIME: 10.4 SEC (ref 9–11.5)
RBC # BLD AUTO: 4.62 MILLION/UL (ref 3.8–5.1)
SODIUM SERPL-SCNC: 142 MMOL/L (ref 135–146)
WBC # BLD AUTO: 8.8 THOUSAND/UL (ref 3.8–10.8)

## 2025-08-11 ENCOUNTER — APPOINTMENT (OUTPATIENT)
Dept: PRIMARY CARE | Facility: CLINIC | Age: 50
End: 2025-08-11
Payer: COMMERCIAL

## 2025-08-11 DIAGNOSIS — Z15.89 JAK2 GENE MUTATION: ICD-10-CM

## 2025-08-11 DIAGNOSIS — E83.51 HYPOCALCEMIA: Primary | ICD-10-CM

## 2025-08-11 DIAGNOSIS — I27.24 CTEPH (CHRONIC THROMBOEMBOLIC PULMONARY HYPERTENSION): ICD-10-CM

## 2025-08-11 DIAGNOSIS — D69.3 ESSENTIAL THROMBOCYTOPENIA (MULTI): ICD-10-CM

## 2025-08-12 LAB
ALBUMIN SERPL-MCNC: 3.9 G/DL (ref 3.6–5.1)
ALP SERPL-CCNC: 118 U/L (ref 31–125)
ALT SERPL-CCNC: 10 U/L (ref 6–29)
ANION GAP SERPL CALCULATED.4IONS-SCNC: 10 MMOL/L (CALC) (ref 7–17)
AST SERPL-CCNC: 17 U/L (ref 10–35)
BASOPHILS # BLD AUTO: 46 CELLS/UL (ref 0–200)
BASOPHILS NFR BLD AUTO: 0.5 %
BILIRUB SERPL-MCNC: 1.4 MG/DL (ref 0.2–1.2)
BUN SERPL-MCNC: 14 MG/DL (ref 7–25)
CALCIUM SERPL-MCNC: 9.1 MG/DL (ref 8.6–10.2)
CHLORIDE SERPL-SCNC: 104 MMOL/L (ref 98–110)
CO2 SERPL-SCNC: 28 MMOL/L (ref 20–32)
CREAT SERPL-MCNC: 0.7 MG/DL (ref 0.5–0.99)
EGFRCR SERPLBLD CKD-EPI 2021: 107 ML/MIN/1.73M2
EOSINOPHIL # BLD AUTO: 164 CELLS/UL (ref 15–500)
EOSINOPHIL NFR BLD AUTO: 1.8 %
ERYTHROCYTE [DISTWIDTH] IN BLOOD BY AUTOMATED COUNT: 21.6 % (ref 11–15)
GLUCOSE SERPL-MCNC: 85 MG/DL (ref 65–99)
HCT VFR BLD AUTO: 40.7 % (ref 35–45)
HGB BLD-MCNC: 12 G/DL (ref 11.7–15.5)
LYMPHOCYTES # BLD AUTO: 2230 CELLS/UL (ref 850–3900)
LYMPHOCYTES NFR BLD AUTO: 24.5 %
MCH RBC QN AUTO: 26.3 PG (ref 27–33)
MCHC RBC AUTO-ENTMCNC: 29.5 G/DL (ref 32–36)
MCV RBC AUTO: 89.3 FL (ref 80–100)
MONOCYTES # BLD AUTO: 719 CELLS/UL (ref 200–950)
MONOCYTES NFR BLD AUTO: 7.9 %
MORPHOLOGY BLD-IMP: ABNORMAL
NEUTROPHILS # BLD AUTO: 5942 CELLS/UL (ref 1500–7800)
NEUTROPHILS NFR BLD AUTO: 65.3 %
PLATELET # BLD AUTO: 497 THOUSAND/UL (ref 140–400)
PMV BLD REES-ECKER: 10.5 FL (ref 7.5–12.5)
POTASSIUM SERPL-SCNC: 4.4 MMOL/L (ref 3.5–5.3)
PROT SERPL-MCNC: 7.9 G/DL (ref 6.1–8.1)
RBC # BLD AUTO: 4.56 MILLION/UL (ref 3.8–5.1)
SODIUM SERPL-SCNC: 142 MMOL/L (ref 135–146)
WBC # BLD AUTO: 9.1 THOUSAND/UL (ref 3.8–10.8)

## 2025-08-25 ENCOUNTER — PATIENT MESSAGE (OUTPATIENT)
Dept: PRIMARY CARE | Facility: CLINIC | Age: 50
End: 2025-08-25

## 2025-08-25 ENCOUNTER — APPOINTMENT (OUTPATIENT)
Dept: PRIMARY CARE | Facility: CLINIC | Age: 50
End: 2025-08-25
Payer: COMMERCIAL

## 2025-08-25 DIAGNOSIS — Z00.00 ANNUAL PHYSICAL EXAM: Primary | ICD-10-CM

## 2025-08-25 DIAGNOSIS — K51.90 ULCERATIVE COLITIS WITHOUT COMPLICATIONS, UNSPECIFIED LOCATION (MULTI): ICD-10-CM

## 2025-08-25 DIAGNOSIS — Z13.29 SCREENING FOR THYROID DISORDER: ICD-10-CM

## 2025-08-25 DIAGNOSIS — Z13.0 SCREENING FOR BLOOD DISEASE: ICD-10-CM

## 2025-08-25 DIAGNOSIS — R11.0 NAUSEA: Primary | ICD-10-CM

## 2025-08-25 DIAGNOSIS — Z15.89 JAK2 GENE MUTATION: ICD-10-CM

## 2025-08-25 DIAGNOSIS — E78.2 MIXED HYPERLIPIDEMIA: ICD-10-CM

## 2025-08-25 DIAGNOSIS — R11.0 NAUSEA: ICD-10-CM

## 2025-08-25 RX ORDER — ONDANSETRON 4 MG/1
4 TABLET, FILM COATED ORAL EVERY 8 HOURS PRN
Qty: 20 TABLET | Refills: 0 | Status: SHIPPED | OUTPATIENT
Start: 2025-08-25 | End: 2025-08-26 | Stop reason: ALTCHOICE

## 2025-08-26 LAB
ALBUMIN SERPL-MCNC: 3.8 G/DL (ref 3.6–5.1)
ALP SERPL-CCNC: 122 U/L (ref 31–125)
ALT SERPL-CCNC: 15 U/L (ref 6–29)
ANION GAP SERPL CALCULATED.4IONS-SCNC: 8 MMOL/L (CALC) (ref 7–17)
AST SERPL-CCNC: 22 U/L (ref 10–35)
BASOPHILS # BLD AUTO: 41 CELLS/UL (ref 0–200)
BASOPHILS NFR BLD AUTO: 0.5 %
BILIRUB SERPL-MCNC: 1.1 MG/DL (ref 0.2–1.2)
BUN SERPL-MCNC: 11 MG/DL (ref 7–25)
CALCIUM SERPL-MCNC: 8.7 MG/DL (ref 8.6–10.2)
CHLORIDE SERPL-SCNC: 105 MMOL/L (ref 98–110)
CO2 SERPL-SCNC: 26 MMOL/L (ref 20–32)
CREAT SERPL-MCNC: 0.72 MG/DL (ref 0.5–0.99)
EGFRCR SERPLBLD CKD-EPI 2021: 104 ML/MIN/1.73M2
EOSINOPHIL # BLD AUTO: 90 CELLS/UL (ref 15–500)
EOSINOPHIL NFR BLD AUTO: 1.1 %
ERYTHROCYTE [DISTWIDTH] IN BLOOD BY AUTOMATED COUNT: 20.3 % (ref 11–15)
GLUCOSE SERPL-MCNC: 86 MG/DL (ref 65–99)
HCT VFR BLD AUTO: 38.8 % (ref 35–45)
HGB BLD-MCNC: 11.8 G/DL (ref 11.7–15.5)
INR PPP: 1
LYMPHOCYTES # BLD AUTO: 2222 CELLS/UL (ref 850–3900)
LYMPHOCYTES NFR BLD AUTO: 27.1 %
MCH RBC QN AUTO: 27 PG (ref 27–33)
MCHC RBC AUTO-ENTMCNC: 30.4 G/DL (ref 32–36)
MCV RBC AUTO: 88.8 FL (ref 80–100)
MONOCYTES # BLD AUTO: 705 CELLS/UL (ref 200–950)
MONOCYTES NFR BLD AUTO: 8.6 %
NEUTROPHILS # BLD AUTO: 5141 CELLS/UL (ref 1500–7800)
NEUTROPHILS NFR BLD AUTO: 62.7 %
PLATELET # BLD AUTO: 488 THOUSAND/UL (ref 140–400)
PMV BLD REES-ECKER: 11.3 FL (ref 7.5–12.5)
POTASSIUM SERPL-SCNC: 4.3 MMOL/L (ref 3.5–5.3)
PROT SERPL-MCNC: 7.9 G/DL (ref 6.1–8.1)
PROTHROMBIN TIME: 10.3 SEC (ref 9–11.5)
RBC # BLD AUTO: 4.37 MILLION/UL (ref 3.8–5.1)
SODIUM SERPL-SCNC: 139 MMOL/L (ref 135–146)
WBC # BLD AUTO: 8.2 THOUSAND/UL (ref 3.8–10.8)

## 2025-08-26 RX ORDER — ONDANSETRON 4 MG/1
4 TABLET, ORALLY DISINTEGRATING ORAL EVERY 8 HOURS PRN
Qty: 20 TABLET | Refills: 0 | Status: SHIPPED | OUTPATIENT
Start: 2025-08-26 | End: 2025-09-02

## 2025-09-08 ENCOUNTER — APPOINTMENT (OUTPATIENT)
Dept: PRIMARY CARE | Facility: CLINIC | Age: 50
End: 2025-09-08
Payer: COMMERCIAL

## 2025-09-22 ENCOUNTER — APPOINTMENT (OUTPATIENT)
Dept: PRIMARY CARE | Facility: CLINIC | Age: 50
End: 2025-09-22
Payer: COMMERCIAL